# Patient Record
Sex: MALE | Race: BLACK OR AFRICAN AMERICAN | NOT HISPANIC OR LATINO | Employment: FULL TIME | URBAN - METROPOLITAN AREA
[De-identification: names, ages, dates, MRNs, and addresses within clinical notes are randomized per-mention and may not be internally consistent; named-entity substitution may affect disease eponyms.]

---

## 2017-02-07 ENCOUNTER — ALLSCRIPTS OFFICE VISIT (OUTPATIENT)
Dept: OTHER | Facility: OTHER | Age: 52
End: 2017-02-07

## 2017-02-07 DIAGNOSIS — N20.0 CALCULUS OF KIDNEY: ICD-10-CM

## 2017-02-07 DIAGNOSIS — R10.9 ABDOMINAL PAIN: ICD-10-CM

## 2017-02-17 ENCOUNTER — HOSPITAL ENCOUNTER (OUTPATIENT)
Dept: ULTRASOUND IMAGING | Facility: HOSPITAL | Age: 52
Discharge: HOME | End: 2017-02-17
Payer: COMMERCIAL

## 2017-02-17 ENCOUNTER — TRANSCRIBE ORDERS (OUTPATIENT)
Dept: ADMINISTRATIVE | Facility: HOSPITAL | Age: 52
End: 2017-02-17

## 2017-02-17 ENCOUNTER — APPOINTMENT (OUTPATIENT)
Dept: LAB | Facility: HOSPITAL | Age: 52
End: 2017-02-17
Payer: COMMERCIAL

## 2017-02-17 DIAGNOSIS — N20.0 URIC ACID KIDNEY STONE: ICD-10-CM

## 2017-02-17 DIAGNOSIS — N20.0 CALCULUS OF KIDNEY: ICD-10-CM

## 2017-02-17 DIAGNOSIS — R10.9 ABDOMINAL PAIN: ICD-10-CM

## 2017-02-17 DIAGNOSIS — R10.9 ABDOMINAL PAIN, UNSPECIFIED SITE: ICD-10-CM

## 2017-02-17 DIAGNOSIS — R10.9 ABDOMINAL PAIN, UNSPECIFIED SITE: Primary | ICD-10-CM

## 2017-02-17 LAB
BACTERIA UR QL AUTO: ABNORMAL /HPF
BASOPHILS # BLD AUTO: 0.05 THOUSANDS/ΜL (ref 0–0.1)
BASOPHILS NFR BLD AUTO: 1 % (ref 0–1)
BILIRUB UR QL STRIP: NEGATIVE
CLARITY UR: CLEAR
COLOR UR: YELLOW
EOSINOPHIL # BLD AUTO: 0.17 THOUSAND/ΜL (ref 0–0.61)
EOSINOPHIL NFR BLD AUTO: 3 % (ref 0–6)
ERYTHROCYTE [DISTWIDTH] IN BLOOD BY AUTOMATED COUNT: 11.3 % (ref 11.6–15.1)
GLUCOSE UR STRIP-MCNC: NEGATIVE MG/DL
HCT VFR BLD AUTO: 40.1 % (ref 36.5–49.3)
HGB BLD-MCNC: 13.7 G/DL (ref 12–17)
HGB UR QL STRIP.AUTO: ABNORMAL
KETONES UR STRIP-MCNC: NEGATIVE MG/DL
LEUKOCYTE ESTERASE UR QL STRIP: NEGATIVE
LYMPHOCYTES # BLD AUTO: 3.16 THOUSANDS/ΜL (ref 0.6–4.47)
LYMPHOCYTES NFR BLD AUTO: 61 % (ref 14–44)
MCH RBC QN AUTO: 35.1 PG (ref 26.8–34.3)
MCHC RBC AUTO-ENTMCNC: 34.2 G/DL (ref 31.4–37.4)
MCV RBC AUTO: 103 FL (ref 82–98)
MONOCYTES # BLD AUTO: 0.35 THOUSAND/ΜL (ref 0.17–1.22)
MONOCYTES NFR BLD AUTO: 7 % (ref 4–12)
NEUTROPHILS # BLD AUTO: 1.43 THOUSANDS/ΜL (ref 1.85–7.62)
NEUTS SEG NFR BLD AUTO: 28 % (ref 43–75)
NITRITE UR QL STRIP: NEGATIVE
NON-SQ EPI CELLS URNS QL MICRO: ABNORMAL /HPF
NRBC BLD AUTO-RTO: 0 /100 WBCS
PH UR STRIP.AUTO: 7 [PH] (ref 4.5–8)
PLATELET # BLD AUTO: 296 THOUSANDS/UL (ref 149–390)
PMV BLD AUTO: 9.9 FL (ref 8.9–12.7)
PROT UR STRIP-MCNC: NEGATIVE MG/DL
RBC # BLD AUTO: 3.9 MILLION/UL (ref 3.88–5.62)
RBC #/AREA URNS AUTO: ABNORMAL /HPF
SP GR UR STRIP.AUTO: 1.01 (ref 1–1.03)
UROBILINOGEN UR QL STRIP.AUTO: 0.2 E.U./DL
WBC # BLD AUTO: 5.16 THOUSAND/UL (ref 4.31–10.16)
WBC #/AREA URNS AUTO: ABNORMAL /HPF

## 2017-02-17 PROCEDURE — 76770 US EXAM ABDO BACK WALL COMP: CPT

## 2017-02-17 PROCEDURE — 81001 URINALYSIS AUTO W/SCOPE: CPT | Performed by: INTERNAL MEDICINE

## 2017-02-17 PROCEDURE — 85025 COMPLETE CBC W/AUTO DIFF WBC: CPT

## 2017-02-17 PROCEDURE — 36415 COLL VENOUS BLD VENIPUNCTURE: CPT

## 2017-03-08 ENCOUNTER — TRANSCRIBE ORDERS (OUTPATIENT)
Dept: ADMINISTRATIVE | Facility: HOSPITAL | Age: 52
End: 2017-03-08

## 2017-03-08 DIAGNOSIS — N20.0 CALCULUS OF KIDNEY: Primary | ICD-10-CM

## 2017-03-08 DIAGNOSIS — R10.9 ABDOMINAL PAIN, UNSPECIFIED SITE: ICD-10-CM

## 2017-03-14 DIAGNOSIS — N20.1 CALCULUS OF URETER: ICD-10-CM

## 2017-03-14 DIAGNOSIS — R10.9 ABDOMINAL PAIN: ICD-10-CM

## 2017-03-14 DIAGNOSIS — J20.9 ACUTE BRONCHITIS: ICD-10-CM

## 2017-03-20 ENCOUNTER — HOSPITAL ENCOUNTER (OUTPATIENT)
Dept: CT IMAGING | Facility: HOSPITAL | Age: 52
Discharge: HOME | End: 2017-03-20
Attending: UROLOGY
Payer: COMMERCIAL

## 2017-03-20 DIAGNOSIS — N20.1 CALCULUS OF URETER: ICD-10-CM

## 2017-03-20 DIAGNOSIS — R10.9 ABDOMINAL PAIN: ICD-10-CM

## 2017-03-20 PROCEDURE — 74176 CT ABD & PELVIS W/O CONTRAST: CPT

## 2017-04-03 ENCOUNTER — ALLSCRIPTS OFFICE VISIT (OUTPATIENT)
Dept: OTHER | Facility: OTHER | Age: 52
End: 2017-04-03

## 2017-04-10 ENCOUNTER — HOSPITAL ENCOUNTER (OUTPATIENT)
Dept: RADIOLOGY | Facility: CLINIC | Age: 52
Discharge: HOME | End: 2017-04-10
Payer: COMMERCIAL

## 2017-04-10 ENCOUNTER — ALLSCRIPTS OFFICE VISIT (OUTPATIENT)
Dept: OTHER | Facility: OTHER | Age: 52
End: 2017-04-10

## 2017-04-10 DIAGNOSIS — J20.9 ACUTE BRONCHITIS: ICD-10-CM

## 2017-04-10 PROCEDURE — 71020 HB CHEST X-RAY 2VW FRONTAL&LATL: CPT

## 2017-04-12 ENCOUNTER — GENERIC CONVERSION - ENCOUNTER (OUTPATIENT)
Dept: OTHER | Facility: OTHER | Age: 52
End: 2017-04-12

## 2017-05-17 ENCOUNTER — HOSPITAL ENCOUNTER (OUTPATIENT)
Dept: RADIOLOGY | Facility: HOSPITAL | Age: 52
Discharge: HOME | End: 2017-05-17
Attending: UROLOGY
Payer: COMMERCIAL

## 2017-05-17 ENCOUNTER — HOSPITAL ENCOUNTER (OUTPATIENT)
Dept: ULTRASOUND IMAGING | Facility: HOSPITAL | Age: 52
Discharge: HOME | End: 2017-05-17
Attending: UROLOGY
Payer: COMMERCIAL

## 2017-05-17 DIAGNOSIS — N20.0 CALCULUS OF KIDNEY: ICD-10-CM

## 2017-05-17 PROCEDURE — 74000 HB X-RAY EXAM OF ABDOMEN (SINGLE ANTEROPOSTERIOR VIEW): CPT

## 2017-05-17 PROCEDURE — 76770 US EXAM ABDO BACK WALL COMP: CPT

## 2017-05-26 ENCOUNTER — ALLSCRIPTS OFFICE VISIT (OUTPATIENT)
Dept: OTHER | Facility: OTHER | Age: 52
End: 2017-05-26

## 2017-05-26 ENCOUNTER — TRANSCRIBE ORDERS (OUTPATIENT)
Dept: ADMINISTRATIVE | Facility: HOSPITAL | Age: 52
End: 2017-05-26

## 2017-05-26 DIAGNOSIS — N20.0 KIDNEY STONE: Primary | ICD-10-CM

## 2017-05-27 DIAGNOSIS — N20.0 CALCULUS OF KIDNEY: ICD-10-CM

## 2017-06-02 ENCOUNTER — ALLSCRIPTS OFFICE VISIT (OUTPATIENT)
Dept: OTHER | Facility: OTHER | Age: 52
End: 2017-06-02

## 2017-07-07 ENCOUNTER — ALLSCRIPTS OFFICE VISIT (OUTPATIENT)
Dept: OTHER | Facility: OTHER | Age: 52
End: 2017-07-07

## 2017-08-30 ENCOUNTER — ALLSCRIPTS OFFICE VISIT (OUTPATIENT)
Dept: OTHER | Facility: OTHER | Age: 52
End: 2017-08-30

## 2017-11-02 ENCOUNTER — GENERIC CONVERSION - ENCOUNTER (OUTPATIENT)
Dept: OTHER | Facility: OTHER | Age: 52
End: 2017-11-02

## 2018-01-10 NOTE — RESULT NOTES
Verified Results  * XR CHEST PA & LATERAL [id number] 03:37PM Althea Leroy Order Number: [id number]     Test Name Result Flag Reference   XR CHEST PA & LATERAL (Report)     CHEST      INDICATION: Acute bronchitis  Cough, wheezing, chest tightness  COMPARISON: None     VIEWS: Frontal and lateral projections     IMAGES: 2     FINDINGS:        Cardiomediastinal silhouette appears unremarkable  The lungs are clear  No pneumothorax or pleural effusion  Visualized osseous structures appear within normal limits for the patient's age  IMPRESSION:     No active pulmonary disease         Workstation performed: [id number]     Signed by:   Alexandre Yip MD   4/12/17

## 2018-01-12 VITALS
DIASTOLIC BLOOD PRESSURE: 58 MMHG | HEART RATE: 98 BPM | HEIGHT: 73 IN | SYSTOLIC BLOOD PRESSURE: 120 MMHG | TEMPERATURE: 98.5 F | WEIGHT: 192.5 LBS | OXYGEN SATURATION: 96 % | BODY MASS INDEX: 25.51 KG/M2

## 2018-01-12 VITALS
WEIGHT: 206 LBS | HEART RATE: 75 BPM | RESPIRATION RATE: 18 BRPM | DIASTOLIC BLOOD PRESSURE: 92 MMHG | BODY MASS INDEX: 26.45 KG/M2 | SYSTOLIC BLOOD PRESSURE: 134 MMHG

## 2018-01-13 VITALS
WEIGHT: 200 LBS | DIASTOLIC BLOOD PRESSURE: 90 MMHG | BODY MASS INDEX: 25.67 KG/M2 | SYSTOLIC BLOOD PRESSURE: 136 MMHG | HEART RATE: 95 BPM | HEIGHT: 74 IN | TEMPERATURE: 98.3 F

## 2018-01-14 VITALS
HEIGHT: 73 IN | BODY MASS INDEX: 26.27 KG/M2 | HEART RATE: 82 BPM | WEIGHT: 198.25 LBS | DIASTOLIC BLOOD PRESSURE: 98 MMHG | SYSTOLIC BLOOD PRESSURE: 142 MMHG | TEMPERATURE: 98.5 F | OXYGEN SATURATION: 98 %

## 2018-01-14 VITALS
HEIGHT: 74 IN | BODY MASS INDEX: 25.54 KG/M2 | DIASTOLIC BLOOD PRESSURE: 60 MMHG | HEART RATE: 68 BPM | WEIGHT: 199 LBS | SYSTOLIC BLOOD PRESSURE: 116 MMHG

## 2018-01-14 VITALS
DIASTOLIC BLOOD PRESSURE: 98 MMHG | WEIGHT: 194.25 LBS | OXYGEN SATURATION: 96 % | BODY MASS INDEX: 24.93 KG/M2 | SYSTOLIC BLOOD PRESSURE: 138 MMHG | TEMPERATURE: 98.1 F | HEIGHT: 74 IN | HEART RATE: 84 BPM

## 2018-01-15 VITALS
BODY MASS INDEX: 25.44 KG/M2 | TEMPERATURE: 98.8 F | SYSTOLIC BLOOD PRESSURE: 134 MMHG | WEIGHT: 198.25 LBS | OXYGEN SATURATION: 98 % | HEART RATE: 92 BPM | HEIGHT: 74 IN | DIASTOLIC BLOOD PRESSURE: 82 MMHG

## 2018-01-16 NOTE — PROGRESS NOTES
Chief Complaint  Patient presents for stent with string removal s/p URS 6/21/16  Active Problems    1  Abnormal blood chemistry (790 6) (R79 9)   2  Abnormal loss of weight (783 21) (R63 4)   3  Activity involving cardiorespiratory exercise (E009 9) (Y93 A9)   4  Acute pharyngitis (462) (J02 9)   5  Allergic rhinitis (477 9) (J30 9)   6  Benign essential hypertension (401 1) (I10)   7  Cellulitis, face (682 0) (L03 211)   8  CKD stage G2/A2, GFR 60 - 89 and albumin creatinine ratio 30 - 299 mg/g (585 2)   (N18 2)   9  Encounter for screening colonoscopy (V76 51) (Z12 11)   10  Fatigue (780 79) (R53 83)   11  Fatigue (780 79) (R53 83)   12  Flank pain (789 09) (R10 9)   13  High risk sexual behavior (V69 2) (Z72 51)   14  Hydronephrosis, right (591) (N13 30)   15  Kidney stones (592 0) (N20 0)   16  Macrocytosis (289 89) (D75 89)   17  Mitral valve disorder (424 0) (I05 9)   18  Neutropenia (288 00) (D70 9)   19  Preoperative examination (V72 84) (Z01 818)   20  Renal calculus, right (592 0) (N20 0)   21  Right ureteral calculus (592 1) (N20 1)   22  Special screening examination for neoplasm of prostate (V76 44) (Z12 5)   23  Strep throat (034 0) (J02 0)   24  Thrombocytosis (238 71) (D47 3)   25  Urinary frequency (788 41) (R35 0)    Current Meds   1  Aleve 220 MG Oral Tablet; Therapy: (Recorded:24Rpm2606) to Recorded   2  Multi-Vitamins Oral Tablet; TAKE 1 TABLET DAILY; Therapy: 70KCE7131 to (Evaluate:09Dec2015); Last Rx:09Nov2015 Ordered   3  Oxycodone-Acetaminophen 5-325 MG Oral Tablet (Percocet); TAKE 1 TABLET EVERY 4   TO 6 HOURS AS NEEDED FOR PAIN;   Therapy: 35ODE1618 to (Evaluate:18Jun2016); Last Rx:19May2016 Ordered   4  Tamsulosin HCl - 0 4 MG Oral Capsule; TAKE 1 CAPSULE Bedtime; Therapy: 54HIM5636 to (Evaluate:35Ogv6269)  Requested for: 72IKF5955; Last   Rx:13Ywu1022 Ordered    Allergies    1   No Known Drug Allergies    Vitals  Signs [Data Includes: Current Encounter]    Heart Rate: 82  Systolic: 578  Diastolic: 80  Height: 6 ft 2 in  Weight: 199 lb   BMI Calculated: 25 55  BSA Calculated: 2 17    Procedure    Procedure: Stent Removal   Stent with string removed intact      Assessment    1  Hydronephrosis, right (591) (N13 30)   2  Kidney stones (592 0) (N20 0)    Discussion/Summary    Reviewed pain management, adequate hydration, and to maintain regular bowel movements  Recommended Colace BID and Miralax 1 capful BID  Call office with fever, nausea, vomiting, burning or increased urinary frequency        Future Appointments    Date/Time Provider Specialty Site   08/08/2016 10:15 AM Treva Schlatter, MD Urology [address]     Signatures   Electronically signed by : Lanice Angelucci, ; Jun 24 2016 11:26AM EST                       (Author)    Electronically signed by : Christian Leung MD; Jun 28 2016  4:11PM EST

## 2018-01-22 VITALS
WEIGHT: 205.13 LBS | HEART RATE: 71 BPM | HEIGHT: 74 IN | OXYGEN SATURATION: 96 % | TEMPERATURE: 98.2 F | SYSTOLIC BLOOD PRESSURE: 122 MMHG | BODY MASS INDEX: 26.33 KG/M2 | DIASTOLIC BLOOD PRESSURE: 82 MMHG

## 2018-05-25 ENCOUNTER — HOSPITAL ENCOUNTER (OUTPATIENT)
Dept: ULTRASOUND IMAGING | Facility: HOSPITAL | Age: 53
Discharge: HOME | End: 2018-05-25
Payer: COMMERCIAL

## 2018-05-25 DIAGNOSIS — N20.0 KIDNEY STONE: ICD-10-CM

## 2018-05-25 DIAGNOSIS — N20.0 CALCULUS OF KIDNEY: ICD-10-CM

## 2018-05-25 PROCEDURE — 76770 US EXAM ABDO BACK WALL COMP: CPT

## 2018-07-05 DIAGNOSIS — N20.0 KIDNEY STONES: Primary | ICD-10-CM

## 2018-07-09 ENCOUNTER — HOSPITAL ENCOUNTER (OUTPATIENT)
Dept: RADIOLOGY | Facility: HOSPITAL | Age: 53
Discharge: HOME | End: 2018-07-09
Attending: UROLOGY
Payer: COMMERCIAL

## 2018-07-09 DIAGNOSIS — N20.0 KIDNEY STONES: ICD-10-CM

## 2018-07-09 PROCEDURE — 74018 RADEX ABDOMEN 1 VIEW: CPT

## 2018-07-11 ENCOUNTER — OFFICE VISIT (OUTPATIENT)
Dept: UROLOGY | Facility: CLINIC | Age: 53
End: 2018-07-11
Payer: COMMERCIAL

## 2018-07-11 VITALS
WEIGHT: 198 LBS | BODY MASS INDEX: 25.41 KG/M2 | HEART RATE: 88 BPM | SYSTOLIC BLOOD PRESSURE: 142 MMHG | DIASTOLIC BLOOD PRESSURE: 82 MMHG | HEIGHT: 74 IN

## 2018-07-11 DIAGNOSIS — N20.0 KIDNEY CALCULI: Primary | ICD-10-CM

## 2018-07-11 PROCEDURE — 99213 OFFICE O/P EST LOW 20 MIN: CPT | Performed by: UROLOGY

## 2018-07-11 RX ORDER — MULTIVITAMIN
1 CAPSULE ORAL DAILY
COMMUNITY
End: 2021-07-27

## 2018-07-11 RX ORDER — NAPROXEN SODIUM 220 MG
220 TABLET ORAL AS NEEDED
COMMUNITY
End: 2021-04-20

## 2018-07-11 NOTE — PROGRESS NOTES
Referring Physician: Maite Burris MD  A copy of this note was sent to the referring physician  Diagnoses and all orders for this visit:    Kidney calculi  -     Ambulatory referral to Nephrology; Future  -     CT renal stone study abdomen pelvis wo contrast; Future    Other orders  -     naproxen sodium (ALEVE) 220 MG tablet; Take by mouth  -     Multiple Vitamin (MULTIVITAMIN) capsule; Take 1 capsule by mouth daily            Assessment and plan: Daniela Farris is a 49-year-old male with recurrent nephrolithiasis    Since he continues to pass stones and appears to have a right lower pole intrarenal calculus present on ultrasound (though non radiopaque on plain x-ray) I have recommended evaluation by a nephrologist for metabolic evaluation  Swapna Leslie is on board with this  Since his stones are difficult to visualize on KUB we will obtain a stone protocol CT prior to his next visit in 6 months  Pending the results we can discuss preemptive management of his intrarenal calculus versus continued observation      Andrew Barone MD      Chief Complaint     Kidney stone follow-up      History of Present Illness     Joan Engle is a 45 y o  male returns in follow-up for recurrent nephrolithiasis  He passed a few small kidney stones in February  He had mild discomfort 48-72 hours associated with this  He is currently asymptomatic  He reports no flank pain dysuria, urinary frequency urgency or back pain  He presents with updated KUB and ultrasound  Detailed Urologic History     - please refer to HPI    Review of Systems     Review of Systems   Constitutional: Negative for activity change and fatigue  HENT: Negative for congestion  Eyes: Negative for visual disturbance  Respiratory: Negative for shortness of breath and wheezing  Cardiovascular: Negative for chest pain and leg swelling  Gastrointestinal: Negative for abdominal pain  Endocrine: Positive for polyuria     Genitourinary: Positive for dysuria  Negative for flank pain, hematuria and urgency  Musculoskeletal: Negative for back pain  Allergic/Immunologic: Negative for immunocompromised state  Neurological: Negative for dizziness and numbness  Psychiatric/Behavioral: Negative for dysphoric mood  All other systems reviewed and are negative  AUA SYMPTOM SCORE      Most Recent Value   AUA SYMPTOM SCORE   How often have you had a sensation of not emptying your bladder completely after you finished urinating? 1   How often have you had to urinate again less than two hours after you finished urinating? 0   How often have you found you stopped and started again several times when you urinate? 1   How often have you found it difficult to postpone urination? 2   How often have you had a weak urinary stream?  0   How often have you had to push or strain to begin urination? 2   How many times did you most typically get up to urinate from the time you went to bed at night until the time you got up in the morning? 1   Quality of Life: If you were to spend the rest of your life with your urinary condition just the way it is now, how would you feel about that?  3   AUA SYMPTOM SCORE  7            Allergies     No Known Allergies    Physical Exam     Physical Exam   Constitutional: He is oriented to person, place, and time  He appears well-developed and well-nourished  No distress  HENT:   Head: Normocephalic and atraumatic  Eyes: EOM are normal    Neck: Normal range of motion  Cardiovascular:   Negative lower extremity edema   Pulmonary/Chest: Effort normal and breath sounds normal    Abdominal: Soft  Genitourinary:   Genitourinary Comments: Negative CVA tenderness   Musculoskeletal: Normal range of motion  Neurological: He is alert and oriented to person, place, and time  Skin: Skin is warm  Psychiatric: He has a normal mood and affect   His behavior is normal            Vital Signs  Vitals:    07/11/18 1409   BP: 142/82   BP Location: Left arm   Patient Position: Sitting   Cuff Size: Adult   Pulse: 88   Weight: 89 8 kg (198 lb)   Height: 6' 2" (1 88 m)         Current Medications       Current Outpatient Prescriptions:     Multiple Vitamin (MULTIVITAMIN) capsule, Take 1 capsule by mouth daily, Disp: , Rfl:     naproxen sodium (ALEVE) 220 MG tablet, Take by mouth, Disp: , Rfl:       Active Problems     Patient Active Problem List   Diagnosis    Kidney calculi         Past Medical History     Past Medical History:   Diagnosis Date    Hypertension     resolved    Kidney calculi     Mitral valve prolapse          Surgical History     Past Surgical History:   Procedure Laterality Date    ORTHOPEDIC SURGERY Left     hand fracture repaired    OK CYSTO/URETERO W/LITHOTRIPSY &INDWELL STENT INSRT Right 6/21/2016    Procedure: CYSTOSCOPY; URETEROSCOPY;  RETROGRADE PYELOGRAM; STENT ;  Surgeon: Tommie Carcamo MD;  [facility];  Service: Urology    SHOULDER ARTHROSCOPY W/ LABRAL REPAIR           Family History     Family History   Problem Relation Age of Onset    Stomach cancer Father     Breast cancer Sister          Social History     Social History     History   Smoking Status    Former Smoker   Smokeless Tobacco    Never Used         Pertinent Lab Values     Lab Results   Component Value Date    CREATININE 1 70 (H) 05/11/2016       No results found for: PSA    @RESULTRCNT(1H])@      Pertinent Imaging        FINDINGS:     No radiopaque renal calculi seen      No radiopaque ureteral calculi identified      Normal bowel gas pattern      Mild chronic elevation right hemidiaphragm      No acute osseous abnormality is seen      IMPRESSION:     No radiopaque urinary tract calculi  KIDNEYS:  Symmetric and normal size  Right kidney:  11 2 x 4 7 cm  Left kidney:  10 5 x 6 5 cm      Right kidney  Normal echogenicity and contour  No suspicious masses detected  No hydronephrosis     2-3 Echogenic foci with some distal shadowing are seen in the lower pole of the right kidney compatible with nonobstructing calculi  The largest measures about 0 5 cm  No perinephric fluid collections      Left kidney  Normal echogenicity and contour  No suspicious masses detected  No hydronephrosis  No shadowing calculi  No perinephric fluid collections      URETERS:  Nonvisualized      BLADDER:   Partially distended  No focal thickening or mass lesions  Bilateral ureteral jets detected         IMPRESSION:     Nonobstructing the calculi in the lower pole of the right kidney is also seen on the previous CT of March 20, 2017  Portions of the record may have been created with voice recognition software   Occasional wrong word or "sound a like" substitutions may have occurred due to the inherent limitations of voice recognition software   Read the chart carefully and recognize, using context, where substitutions have occurred

## 2018-07-17 ENCOUNTER — OFFICE VISIT (OUTPATIENT)
Dept: INTERNAL MEDICINE CLINIC | Facility: CLINIC | Age: 53
End: 2018-07-17
Payer: COMMERCIAL

## 2018-07-17 ENCOUNTER — TELEPHONE (OUTPATIENT)
Dept: INTERNAL MEDICINE CLINIC | Facility: CLINIC | Age: 53
End: 2018-07-17

## 2018-07-17 VITALS
WEIGHT: 198 LBS | DIASTOLIC BLOOD PRESSURE: 92 MMHG | HEART RATE: 84 BPM | HEIGHT: 74 IN | BODY MASS INDEX: 25.41 KG/M2 | OXYGEN SATURATION: 96 % | SYSTOLIC BLOOD PRESSURE: 140 MMHG

## 2018-07-17 DIAGNOSIS — M79.674 TOE PAIN, RIGHT: Primary | ICD-10-CM

## 2018-07-17 PROCEDURE — 99214 OFFICE O/P EST MOD 30 MIN: CPT | Performed by: PHYSICIAN ASSISTANT

## 2018-07-17 RX ORDER — PREDNISONE 10 MG/1
10 TABLET ORAL DAILY
Qty: 20 TABLET | Refills: 0 | Status: SHIPPED | OUTPATIENT
Start: 2018-07-17 | End: 2018-09-18 | Stop reason: ALTCHOICE

## 2018-07-17 NOTE — PROGRESS NOTES
Assessment/Plan:     pain of the right great toe status post trauma - x-ray of the right foot with attention to the right great toe  Prednisone taper  The patient may need to see an orthopedist   In the meantime he can gayle tape his 1st and 2nd toe together  No problem-specific Assessment & Plan notes found for this encounter  Diagnoses and all orders for this visit:    Toe pain, right  -     XR foot 3+ vw right; Future  -     predniSONE 10 mg tablet; Take 1 tablet (10 mg total) by mouth daily 40mg daily for 2 days, 30mg daily for 2 days, 20mg daily for 2 days, 10mg daily for 2 days          Subjective:      Patient ID: Matthew Miranda is a 45 y o  male  Patient comes in complaining of pain and swelling of his right great toe  He was working out at [employer] on Sunday, 2 days ago, and he says he jammed his toe  He said it happened so fast she does not really know exactly how he did it  The patient does have a bunion which is chronic but he has pain along the length of his right great toe with tenderness and some swelling  Decreased range of motion of his toes as a result of pain  No numbness or tingling  He is wearing a slipper in the office  The following portions of the patient's history were reviewed and updated as appropriate: allergies, current medications, past family history, past medical history, past social history, past surgical history and problem list     Review of Systems   Musculoskeletal: Positive for arthralgias and joint swelling  Skin: Negative for rash  Neurological: Negative for numbness  Objective:      /92   Pulse 84   Ht 6' 2" (1 88 m)   Wt 89 8 kg (198 lb)   SpO2 96%   BMI 25 42 kg/m²          Physical Exam   Cardiovascular: Normal rate and regular rhythm  Pulmonary/Chest: Effort normal and breath sounds normal    Musculoskeletal:        Feet:      Slight swelling, tenderness and pain in the area marked red    Bunion in the area marked blue

## 2018-07-17 NOTE — TELEPHONE ENCOUNTER
David would like dr solano to give him an order to get an xray of his right big toe  Thinks he broke it Sunday  Please call him when done   Thanks [phone]

## 2018-07-18 ENCOUNTER — HOSPITAL ENCOUNTER (OUTPATIENT)
Dept: RADIOLOGY | Facility: HOSPITAL | Age: 53
Discharge: HOME | End: 2018-07-18
Payer: COMMERCIAL

## 2018-07-18 ENCOUNTER — TELEPHONE (OUTPATIENT)
Dept: INTERNAL MEDICINE CLINIC | Facility: CLINIC | Age: 53
End: 2018-07-18

## 2018-07-18 DIAGNOSIS — M79.674 TOE PAIN, RIGHT: ICD-10-CM

## 2018-07-18 PROCEDURE — 73630 X-RAY EXAM OF FOOT: CPT

## 2018-07-18 NOTE — TELEPHONE ENCOUNTER
Scott Hunter from [facility] radiology called to let Elisa Wen know results are in for pt's toe xray   Please advise

## 2018-09-18 ENCOUNTER — OFFICE VISIT (OUTPATIENT)
Dept: INTERNAL MEDICINE CLINIC | Facility: CLINIC | Age: 53
End: 2018-09-18
Payer: COMMERCIAL

## 2018-09-18 VITALS
RESPIRATION RATE: 14 BRPM | HEIGHT: 74 IN | WEIGHT: 201 LBS | BODY MASS INDEX: 25.8 KG/M2 | HEART RATE: 80 BPM | DIASTOLIC BLOOD PRESSURE: 74 MMHG | OXYGEN SATURATION: 98 % | SYSTOLIC BLOOD PRESSURE: 132 MMHG

## 2018-09-18 DIAGNOSIS — Z12.11 SCREENING FOR COLON CANCER: Primary | ICD-10-CM

## 2018-09-18 DIAGNOSIS — J30.1 SEASONAL ALLERGIC RHINITIS DUE TO POLLEN: ICD-10-CM

## 2018-09-18 DIAGNOSIS — Z12.5 SCREENING FOR PROSTATE CANCER: ICD-10-CM

## 2018-09-18 DIAGNOSIS — I10 BENIGN ESSENTIAL HYPERTENSION: ICD-10-CM

## 2018-09-18 DIAGNOSIS — Z12.11 SCREEN FOR COLON CANCER: ICD-10-CM

## 2018-09-18 PROBLEM — N20.1 LEFT URETERAL CALCULUS: Status: ACTIVE | Noted: 2017-03-07

## 2018-09-18 PROBLEM — H61.22 IMPACTED CERUMEN OF LEFT EAR: Status: ACTIVE | Noted: 2018-09-18

## 2018-09-18 PROBLEM — M21.611 BUNION, RIGHT FOOT: Status: ACTIVE | Noted: 2017-11-02

## 2018-09-18 PROBLEM — N18.30 CHRONIC KIDNEY DISEASE (CKD), STAGE III (MODERATE) (HCC): Status: ACTIVE | Noted: 2017-11-02

## 2018-09-18 PROCEDURE — 3008F BODY MASS INDEX DOCD: CPT | Performed by: INTERNAL MEDICINE

## 2018-09-18 PROCEDURE — 99214 OFFICE O/P EST MOD 30 MIN: CPT | Performed by: INTERNAL MEDICINE

## 2018-09-18 RX ORDER — FLUTICASONE PROPIONATE 50 MCG
2 SPRAY, SUSPENSION (ML) NASAL DAILY
Qty: 16 G | Refills: 1 | Status: SHIPPED | OUTPATIENT
Start: 2018-09-18 | End: 2021-03-23

## 2018-09-18 NOTE — PROGRESS NOTES
Assessment/Plan:    Impacted cerumen of left ear  Start him on debrox for one week then arrange for ear lavage with Christa Real     Seasonal allergic rhinitis due to pollen  No evidence of acute infection  Will start nasocort and if that fails add in zyrtec    Screen for colon cancer  Fit ordered    Screening for prostate cancer  psa ordered       Diagnoses and all orders for this visit:    Screening for colon cancer  -     Ambulatory referral to Gastroenterology; Future    Seasonal allergic rhinitis due to pollen  -     fluticasone (FLONASE) 50 mcg/act nasal spray; 2 sprays into each nostril daily    Benign essential hypertension    Screen for colon cancer    Screening for prostate cancer          Subjective:      Patient ID: Екатерина Staton is a 52 y o  male  Patient presents with a one week hx of left nasal congestion and left ear popping and with excessive cerumen  He has left nasal clear rhinorrhea  No fevers, chills, sweats  He has and occasional cough  No sob  He took motrin, advil allergy and cold  It didn't make a difference  No contacts with uri sx  The following portions of the patient's history were reviewed and updated as appropriate: allergies, current medications, past family history, past medical history, past social history, past surgical history and problem list     Review of Systems   Constitutional: Negative for activity change, appetite change, chills, diaphoresis, fatigue, fever and unexpected weight change  HENT: Positive for congestion and rhinorrhea  Negative for dental problem, drooling, ear discharge, ear pain, facial swelling, hearing loss, mouth sores, nosebleeds, postnasal drip, sinus pain, sinus pressure, sneezing, sore throat, tinnitus, trouble swallowing and voice change  Eyes: Negative for photophobia, pain, discharge, redness, itching and visual disturbance  Respiratory: Negative for apnea, cough, choking, chest tightness, shortness of breath, wheezing and stridor  Cardiovascular: Negative for chest pain, palpitations and leg swelling  Gastrointestinal: Negative for abdominal distention, abdominal pain, anal bleeding, blood in stool, constipation, diarrhea, nausea, rectal pain and vomiting  Endocrine: Negative for cold intolerance, heat intolerance, polydipsia, polyphagia and polyuria  Genitourinary: Negative for decreased urine volume, difficulty urinating, dysuria, enuresis, flank pain, frequency, genital sores, hematuria and urgency  Musculoskeletal: Negative for arthralgias, back pain, gait problem, joint swelling, myalgias, neck pain and neck stiffness  Skin: Negative for color change, pallor, rash and wound  Allergic/Immunologic: Negative for environmental allergies, food allergies and immunocompromised state  Neurological: Negative for dizziness, tremors, seizures, syncope, facial asymmetry, speech difficulty, weakness, light-headedness, numbness and headaches  Hematological: Negative for adenopathy  Does not bruise/bleed easily  Psychiatric/Behavioral: Negative for agitation, self-injury, sleep disturbance and suicidal ideas  The patient is not nervous/anxious  Objective:      /74   Pulse 80   Resp 14   Ht 6' 2" (1 88 m)   Wt 91 2 kg (201 lb)   SpO2 98%   BMI 25 81 kg/m²          Physical Exam   Constitutional: He is oriented to person, place, and time  He appears well-developed and well-nourished  No distress  HENT:   Head: Normocephalic and atraumatic  Right Ear: External ear normal    Mouth/Throat: Oropharynx is clear and moist    Left cerumen impaction   Eyes: Conjunctivae and EOM are normal  Pupils are equal, round, and reactive to light  No scleral icterus  Neck: Normal range of motion  Neck supple  No JVD present  No tracheal deviation present  No thyromegaly present  Cardiovascular: Normal rate, regular rhythm and intact distal pulses  Exam reveals no gallop and no friction rub      No murmur heard   Pulmonary/Chest: Effort normal and breath sounds normal  No respiratory distress  He has no wheezes  He has no rales  He exhibits no tenderness  Abdominal: Soft  Bowel sounds are normal  He exhibits no distension and no mass  There is no tenderness  There is no rebound and no guarding  Musculoskeletal: Normal range of motion  He exhibits no edema, tenderness or deformity  Lymphadenopathy:     He has no cervical adenopathy  Neurological: He is alert and oriented to person, place, and time  He has normal reflexes  No cranial nerve deficit  Coordination normal    Skin: Skin is warm and dry  No rash noted  He is not diaphoretic  No erythema  No pallor  Psychiatric: He has a normal mood and affect   His behavior is normal  Judgment and thought content normal

## 2018-09-24 ENCOUNTER — OFFICE VISIT (OUTPATIENT)
Dept: INTERNAL MEDICINE CLINIC | Facility: CLINIC | Age: 53
End: 2018-09-24
Payer: COMMERCIAL

## 2018-09-24 VITALS
OXYGEN SATURATION: 98 % | BODY MASS INDEX: 26.06 KG/M2 | WEIGHT: 203 LBS | SYSTOLIC BLOOD PRESSURE: 122 MMHG | DIASTOLIC BLOOD PRESSURE: 82 MMHG | HEART RATE: 69 BPM | TEMPERATURE: 98.2 F

## 2018-09-24 DIAGNOSIS — H61.22 IMPACTED CERUMEN OF LEFT EAR: Primary | ICD-10-CM

## 2018-09-24 LAB
ALBUMIN SERPL-MCNC: 4.3 G/DL (ref 3.5–5.5)
ALBUMIN/GLOB SERPL: 1.9 {RATIO} (ref 1.2–2.2)
ALP SERPL-CCNC: 45 IU/L (ref 39–117)
ALT SERPL-CCNC: 21 IU/L (ref 0–44)
AMBIG ABBREV DEFAULT: NORMAL
AMBIG ABBREV DEFAULT: NORMAL
AST SERPL-CCNC: 25 IU/L (ref 0–40)
BASOPHILS # BLD AUTO: 0.1 X10E3/UL (ref 0–0.2)
BASOPHILS NFR BLD AUTO: 1 %
BILIRUB SERPL-MCNC: 0.9 MG/DL (ref 0–1.2)
BUN SERPL-MCNC: 11 MG/DL (ref 6–24)
BUN/CREAT SERPL: 10 (ref 9–20)
CALCIUM SERPL-MCNC: 9.5 MG/DL (ref 8.7–10.2)
CHLORIDE SERPL-SCNC: 102 MMOL/L (ref 96–106)
CHOLEST SERPL-MCNC: 142 MG/DL (ref 100–199)
CO2 SERPL-SCNC: 23 MMOL/L (ref 20–29)
CREAT SERPL-MCNC: 1.07 MG/DL (ref 0.76–1.27)
EOSINOPHIL # BLD AUTO: 1 X10E3/UL (ref 0–0.4)
EOSINOPHIL NFR BLD AUTO: 17 %
ERYTHROCYTE [DISTWIDTH] IN BLOOD BY AUTOMATED COUNT: 11.5 % (ref 12.3–15.4)
GLOBULIN SER-MCNC: 2.3 G/DL (ref 1.5–4.5)
GLUCOSE SERPL-MCNC: 102 MG/DL (ref 65–99)
HCT VFR BLD AUTO: 43 % (ref 37.5–51)
HDLC SERPL-MCNC: 58 MG/DL
HGB BLD-MCNC: 14.3 G/DL (ref 13–17.7)
IMM GRANULOCYTES # BLD: 0 X10E3/UL (ref 0–0.1)
IMM GRANULOCYTES NFR BLD: 0 %
LDLC SERPL CALC-MCNC: 54 MG/DL (ref 0–99)
LYMPHOCYTES # BLD AUTO: 2.7 X10E3/UL (ref 0.7–3.1)
LYMPHOCYTES NFR BLD AUTO: 44 %
MCH RBC QN AUTO: 35.6 PG (ref 26.6–33)
MCHC RBC AUTO-ENTMCNC: 33.3 G/DL (ref 31.5–35.7)
MCV RBC AUTO: 107 FL (ref 79–97)
MONOCYTES # BLD AUTO: 0.4 X10E3/UL (ref 0.1–0.9)
MONOCYTES NFR BLD AUTO: 7 %
NEUTROPHILS # BLD AUTO: 1.9 X10E3/UL (ref 1.4–7)
NEUTROPHILS NFR BLD AUTO: 31 %
PLATELET # BLD AUTO: 312 X10E3/UL (ref 150–379)
POTASSIUM SERPL-SCNC: 4.6 MMOL/L (ref 3.5–5.2)
PROT SERPL-MCNC: 6.6 G/DL (ref 6–8.5)
PSA SERPL-MCNC: 0.4 NG/ML (ref 0–4)
RBC # BLD AUTO: 4.02 X10E6/UL (ref 4.14–5.8)
SL AMB EGFR AFRICAN AMERICAN: 90 ML/MIN/1.73
SL AMB EGFR NON AFRICAN AMERICAN: 78 ML/MIN/1.73
SODIUM SERPL-SCNC: 139 MMOL/L (ref 134–144)
TRIGL SERPL-MCNC: 150 MG/DL (ref 0–149)
TSH SERPL DL<=0.005 MIU/L-ACNC: 1.5 UIU/ML (ref 0.45–4.5)
WBC # BLD AUTO: 6.1 X10E3/UL (ref 3.4–10.8)

## 2018-09-24 PROCEDURE — 99213 OFFICE O/P EST LOW 20 MIN: CPT | Performed by: PHYSICIAN ASSISTANT

## 2018-09-24 NOTE — PROGRESS NOTES
Assessment/Plan:    Left cerumen impaction-  Left ear irrigated and successful  Pt tolerated the procedure well  No complaints  No problem-specific Assessment & Plan notes found for this encounter  There are no diagnoses linked to this encounter  Subjective:      Patient ID: Irwin Islas is a 52 y o  male  Cerumen impaction, left ear  He was here about a week or so ago and Dr Kiel Bal noticed his left ear was impacted with wax  He was told to get Debrox and use them for 5 days before coming back in for irrigation  No ear pain  Slight muffled sensation        The following portions of the patient's history were reviewed and updated as appropriate: allergies, current medications, past family history, past medical history, past social history, past surgical history and problem list     Review of Systems   HENT: Positive for hearing loss  Negative for ear pain, sinus pressure, sneezing and sore throat  Respiratory: Negative for cough  Cardiovascular: Negative for chest pain  Objective:      /82 (BP Location: Left arm, Patient Position: Sitting, Cuff Size: Standard)   Pulse 69   Temp 98 2 °F (36 8 °C)   Wt 92 1 kg (203 lb)   SpO2 98%   BMI 26 06 kg/m²          Physical Exam   Constitutional: He appears well-developed and well-nourished  HENT:   Head: Normocephalic and atraumatic  Right Ear: External ear normal    Left Ear: External ear normal    Mouth/Throat: Oropharynx is clear and moist  No oropharyngeal exudate  Cerumen impacted left ear  Right ear is normal   Cardiovascular: Normal rate and regular rhythm  Pulmonary/Chest: Effort normal and breath sounds normal    Abdominal: Soft

## 2018-09-27 ENCOUNTER — TELEPHONE (OUTPATIENT)
Dept: INTERNAL MEDICINE CLINIC | Facility: CLINIC | Age: 53
End: 2018-09-27

## 2018-10-02 ENCOUNTER — OFFICE VISIT (OUTPATIENT)
Dept: INTERNAL MEDICINE CLINIC | Facility: CLINIC | Age: 53
End: 2018-10-02
Payer: COMMERCIAL

## 2018-10-02 VITALS
OXYGEN SATURATION: 83 % | HEIGHT: 74 IN | DIASTOLIC BLOOD PRESSURE: 84 MMHG | WEIGHT: 200 LBS | HEART RATE: 99 BPM | BODY MASS INDEX: 25.67 KG/M2 | RESPIRATION RATE: 16 BRPM | SYSTOLIC BLOOD PRESSURE: 128 MMHG

## 2018-10-02 DIAGNOSIS — H61.22 IMPACTED CERUMEN OF LEFT EAR: Primary | ICD-10-CM

## 2018-10-02 DIAGNOSIS — J30.1 SEASONAL ALLERGIC RHINITIS DUE TO POLLEN: ICD-10-CM

## 2018-10-02 DIAGNOSIS — Z12.5 SCREENING FOR PROSTATE CANCER: ICD-10-CM

## 2018-10-02 DIAGNOSIS — R73.01 IMPAIRED FASTING GLUCOSE: ICD-10-CM

## 2018-10-02 DIAGNOSIS — I10 BENIGN ESSENTIAL HYPERTENSION: ICD-10-CM

## 2018-10-02 PROCEDURE — 99214 OFFICE O/P EST MOD 30 MIN: CPT | Performed by: INTERNAL MEDICINE

## 2018-10-02 NOTE — ASSESSMENT & PLAN NOTE
He is taking flonase zyrtec and pseuodphed  I think he may have so much dryness from the regimen that it is clogging his left nostril  Will dc all and try saline spray 2-3x daily and he will let me know how he is doing  If he fails to improve consider a course of ceftin

## 2018-10-02 NOTE — PROGRESS NOTES
Assessment/Plan:    Seasonal allergic rhinitis due to pollen  He is taking flonase zyrtec and pseuodphed  I think he may have so much dryness from the regimen that it is clogging his left nostril  Will dc all and try saline spray 2-3x daily and he will let me know how he is doing  If he fails to improve consider a course of ceftin  Screening for prostate cancer  psa and sylvester benign  Impaired fasting glucose  He has never had a fbs over 100  Will check fbs and a1c with next labs  Diagnoses and all orders for this visit:    Impacted cerumen of left ear    Seasonal allergic rhinitis due to pollen    Benign essential hypertension    Screening for prostate cancer    Impaired fasting glucose          Subjective:      Patient ID: Jose Alejandro Tapia is a 52 y o  male  Patient presents with complaints of ongoing clear rhinorrhea and left nostril blockage  He says his alan shirt is slighly damp at night  He has a slight cough which is worse in the am  No sob  He has been taking flonase and pseudophedrine  The following portions of the patient's history were reviewed and updated as appropriate: allergies, current medications, past family history, past medical history, past social history, past surgical history and problem list     Review of Systems   Constitutional: Negative for activity change, appetite change, chills, diaphoresis, fatigue, fever and unexpected weight change  Sweats   HENT: Positive for rhinorrhea  Negative for congestion, dental problem, drooling, ear discharge, ear pain, facial swelling, hearing loss, mouth sores, nosebleeds, postnasal drip, sinus pain, sinus pressure, sneezing, sore throat, tinnitus, trouble swallowing and voice change  Eyes: Negative for photophobia, pain, discharge, redness, itching and visual disturbance  Respiratory: Negative for apnea, choking, chest tightness, shortness of breath, wheezing and stridor      Cardiovascular: Negative for chest pain, palpitations and leg swelling  Gastrointestinal: Negative for abdominal distention, abdominal pain, anal bleeding, blood in stool, constipation, diarrhea, nausea, rectal pain and vomiting  Endocrine: Negative for cold intolerance, heat intolerance, polydipsia, polyphagia and polyuria  Genitourinary: Negative for decreased urine volume, difficulty urinating, dysuria, enuresis, flank pain, frequency, genital sores, hematuria and urgency  Musculoskeletal: Negative for arthralgias, back pain, gait problem, joint swelling, myalgias, neck pain and neck stiffness  Skin: Negative for color change, pallor, rash and wound  Allergic/Immunologic: Negative for environmental allergies, food allergies and immunocompromised state  Neurological: Negative for dizziness, tremors, seizures, syncope, facial asymmetry, speech difficulty, weakness, light-headedness, numbness and headaches  Hematological: Negative for adenopathy  Does not bruise/bleed easily  Psychiatric/Behavioral: Negative for agitation, self-injury, sleep disturbance and suicidal ideas  The patient is not nervous/anxious  Objective:      /84   Pulse 99   Resp 16   Ht 6' 2" (1 88 m)   Wt 90 7 kg (200 lb)   SpO2 (!) 83%   BMI 25 68 kg/m²          Physical Exam   Constitutional: He is oriented to person, place, and time  He appears well-developed and well-nourished  No distress  HENT:   Head: Normocephalic and atraumatic  Right Ear: External ear normal    Left Ear: External ear normal    Mouth/Throat: Oropharynx is clear and moist  No oropharyngeal exudate  Eyes: Pupils are equal, round, and reactive to light  Conjunctivae and EOM are normal  No scleral icterus  Neck: Normal range of motion  Neck supple  No JVD present  No tracheal deviation present  No thyromegaly present  Cardiovascular: Normal rate, regular rhythm and intact distal pulses  Exam reveals no gallop and no friction rub  No murmur heard    Pulmonary/Chest: Effort normal and breath sounds normal  No respiratory distress  He has no wheezes  He has no rales  He exhibits no tenderness  Abdominal: Soft  Bowel sounds are normal  He exhibits no distension and no mass  There is no tenderness  There is no rebound and no guarding  Genitourinary:   Genitourinary Comments: Nemg  Testes descended bilaterally without masses  No penile discharge or masses  Nl rectal tone  The prostate is small and smooth and without masses  Musculoskeletal: Normal range of motion  He exhibits no edema, tenderness or deformity  Lymphadenopathy:     He has no cervical adenopathy  Neurological: He is alert and oriented to person, place, and time  He has normal reflexes  No cranial nerve deficit  He exhibits normal muscle tone  Coordination normal    Skin: Skin is warm and dry  No rash noted  He is not diaphoretic  No erythema  No pallor  Psychiatric: He has a normal mood and affect   His behavior is normal  Judgment and thought content normal

## 2018-10-04 ENCOUNTER — TELEPHONE (OUTPATIENT)
Dept: INTERNAL MEDICINE CLINIC | Facility: CLINIC | Age: 53
End: 2018-10-04

## 2018-10-04 DIAGNOSIS — R09.81 NASAL SINUS CONGESTION: Primary | ICD-10-CM

## 2018-10-04 DIAGNOSIS — J06.9 UPPER RESPIRATORY TRACT INFECTION, UNSPECIFIED TYPE: ICD-10-CM

## 2018-10-04 RX ORDER — AMOXICILLIN AND CLAVULANATE POTASSIUM 875; 125 MG/1; MG/1
1 TABLET, FILM COATED ORAL EVERY 12 HOURS SCHEDULED
Qty: 20 TABLET | Refills: 0 | Status: SHIPPED | OUTPATIENT
Start: 2018-10-04 | End: 2018-10-14

## 2018-10-04 NOTE — TELEPHONE ENCOUNTER
Has he tried a nasal spray with the addition of something like Claritin, Allegra or Zyrtec? If he hasn't, I can order the nasal spray and he can get Claritin--generic, and try this for a week or so  I will still put in an ENT referral in the event that this does not help or he has already tried it    Let me know about the nasal spray

## 2018-10-04 NOTE — TELEPHONE ENCOUNTER
Spoke with pt  Saw dr solano  On 10/02  He is taking flonase and zytec nothing is working he does have yellow mucus

## 2018-10-04 NOTE — TELEPHONE ENCOUNTER
Pt would like a referral to see an ENT specialist  Rochelle Waggoner he's been having a lot of nasal congestion  He's been trying OTC medication but nothing seems to be helping  Pt aware Dr Tyrone Zheng not in the office until Monday, wants to know if a PA could address this   Please advise     [phone]

## 2018-10-12 ENCOUNTER — OFFICE VISIT (OUTPATIENT)
Dept: INTERNAL MEDICINE CLINIC | Facility: CLINIC | Age: 53
End: 2018-10-12
Payer: COMMERCIAL

## 2018-10-12 VITALS
DIASTOLIC BLOOD PRESSURE: 92 MMHG | SYSTOLIC BLOOD PRESSURE: 140 MMHG | TEMPERATURE: 97.6 F | HEART RATE: 80 BPM | BODY MASS INDEX: 25.55 KG/M2 | OXYGEN SATURATION: 98 % | WEIGHT: 199 LBS

## 2018-10-12 DIAGNOSIS — J31.0 OTHER RHINITIS: Primary | ICD-10-CM

## 2018-10-12 DIAGNOSIS — R09.82 POST-NASAL DRIP: ICD-10-CM

## 2018-10-12 DIAGNOSIS — R09.81 NASAL CONGESTION: ICD-10-CM

## 2018-10-12 PROCEDURE — 99214 OFFICE O/P EST MOD 30 MIN: CPT | Performed by: PHYSICIAN ASSISTANT

## 2018-10-12 PROCEDURE — 1036F TOBACCO NON-USER: CPT | Performed by: PHYSICIAN ASSISTANT

## 2018-10-12 NOTE — PROGRESS NOTES
Assessment/Plan:    Allergic rhinitis/rhinorrhea/severe nasal congestion-he will go back on Zyrtec daily, avoid Sudafed, Flonase nasal spray every other day, nasal saline as needed as much as he needs it  He should also try and Neti pot  He will keep his appointment with Dr Deb Walton    No problem-specific [facility] notes found for this encounter  There are no diagnoses linked to this encounter  Subjective:      Patient ID: Dunia Cole is a 52 y o  male  The patient has been here for nasal congestion, cerumen impaction and respiratory symptoms several times in the last few weeks  He was here last week where was felt that the medications he was taking was drying him out too much  He was taking Zyrtec, nasal spray and Sudafed  He was told by Dr solano to stop all of those and just use saline nasal spray  He was advised that if his symptoms did not improve to call the office and we would order antibiotic  That is in fact what happened, he did call back and we ordered Augmentin  He has a few pills left  He is here today now because his right nostril is completely clear but his left nostril is totally clogged every once in a while he gets an uncontrollable drainage of clear mucus running out of his nose  The patient does have an appointment with ENT in about a week and half but he is looking for some relief  He is using nasal saline at will  The following portions of the patient's history were reviewed and updated as appropriate: allergies, current medications, past family history, past medical history, past social history, past surgical history and problem list     Review of Systems   Constitutional: Negative for chills  HENT: Positive for congestion and rhinorrhea  Negative for ear pain, postnasal drip, sinus pain, sinus pressure, sneezing and sore throat  Respiratory: Negative for cough  Cardiovascular: Negative for chest pain and palpitations     Gastrointestinal: Negative for abdominal pain, diarrhea and nausea  Objective:      /92 (BP Location: Left arm, Patient Position: Sitting, Cuff Size: Standard)   Pulse 80   Temp 97 6 °F (36 4 °C)   Wt 90 3 kg (199 lb)   SpO2 98%   BMI 25 55 kg/m²          Physical Exam   Constitutional: He appears well-developed and well-nourished  HENT:   Head: Normocephalic and atraumatic  Right Ear: External ear normal    Left Ear: External ear normal    Nose: Rhinorrhea present  Mouth/Throat: Oropharynx is clear and moist  No oropharyngeal exudate  Neck: Normal range of motion  Cardiovascular: Normal rate and regular rhythm  Pulmonary/Chest: Effort normal and breath sounds normal  No respiratory distress  Abdominal: Soft  Bowel sounds are normal    Lymphadenopathy:     He has no cervical adenopathy

## 2019-01-11 ENCOUNTER — HOSPITAL ENCOUNTER (OUTPATIENT)
Dept: CT IMAGING | Facility: HOSPITAL | Age: 54
Discharge: HOME | End: 2019-01-11
Attending: UROLOGY
Payer: COMMERCIAL

## 2019-01-11 DIAGNOSIS — N20.0 KIDNEY CALCULI: ICD-10-CM

## 2019-01-11 PROCEDURE — 74176 CT ABD & PELVIS W/O CONTRAST: CPT

## 2019-01-17 ENCOUNTER — OFFICE VISIT (OUTPATIENT)
Dept: UROLOGY | Facility: CLINIC | Age: 54
End: 2019-01-17
Payer: COMMERCIAL

## 2019-01-17 VITALS
DIASTOLIC BLOOD PRESSURE: 66 MMHG | HEART RATE: 82 BPM | HEIGHT: 74 IN | SYSTOLIC BLOOD PRESSURE: 132 MMHG | BODY MASS INDEX: 26.41 KG/M2 | WEIGHT: 205.8 LBS

## 2019-01-17 DIAGNOSIS — Z12.5 PROSTATE CANCER SCREENING: ICD-10-CM

## 2019-01-17 DIAGNOSIS — N20.0 NEPHROLITHIASIS: Primary | ICD-10-CM

## 2019-01-17 PROCEDURE — 99213 OFFICE O/P EST LOW 20 MIN: CPT | Performed by: PHYSICIAN ASSISTANT

## 2019-01-17 NOTE — PROGRESS NOTES
1  Nephrolithiasis  US kidney and bladder   2  Prostate cancer screening  PSA, Total Screen     Assessment and plan:       1  Recurrent nephrolithiasis - managed by Dr Stefanie Rich  - we discussed that he has a stable 4 mm right renal nonobstructing calculi  We discussed that this stone is within passable size  Patient elects for observation  - we discussed the importance of proper hydration and dietary modifications to minimize future stone formation  - patient follow-up in 1 year with an ultrasound of the kidney and bladder PSA prior to visit for continued surveillance  Gonzalez Robles PA-C      Chief Complaint     Chief Complaint   Patient presents with    Nephrolithiasis       History of Present Illness     Jorge Huffman is a 52 y o  male patient of Dr Stefanie Rich with a history of recurrent nephrolithiasis presenting for follow-up  Patient has a history of recurrent nephrolithiasis  Patient has been able to pass most of his stones on his own  Patient had a recent CT stone study (01/11/2019)  This revealed a stable 4 mm nonobstructing right lower pole calculus  There was no left intrarenal calculi  There is no evidence of obstructive uropathy  Urinary bladder was unremarkable  Stable left adrenal adenoma noted  Patient has been doing well from a urinary perspective  He feels like he has a good stream, feels empty after urination, has nocturia 1-2 times nightly  Denies any dysuria, gross hematuria, urgency, hesitancy, or urinary infections  Patient's most recent PSA is 0 4 (09/20/2018)  Patient reports that he just had a prostate examination within the past month with his primary care provider in [place] repeat prostate exam today in the office        Laboratory     Lab Results   Component Value Date    CREATININE 1 70 (H) 05/11/2016       Lab Results   Component Value Date    PSA 0 4 09/20/2018     Review of Systems     Review of Systems   Constitutional: Negative for activity change, appetite change, chills, diaphoresis, fatigue, fever and unexpected weight change  Respiratory: Negative for chest tightness and shortness of breath  Cardiovascular: Negative for chest pain, palpitations and leg swelling  Gastrointestinal: Negative for abdominal distention, abdominal pain, constipation, diarrhea, nausea and vomiting  Genitourinary: Negative for decreased urine volume, difficulty urinating, dysuria, enuresis, flank pain, frequency, genital sores, hematuria and urgency  Musculoskeletal: Negative for back pain, gait problem and myalgias  Skin: Negative for color change, pallor, rash and wound  Psychiatric/Behavioral: Negative for behavioral problems  The patient is not nervous/anxious  Allergies     No Known Allergies    Physical Exam     Physical Exam   Constitutional: He is oriented to person, place, and time  He appears well-developed and well-nourished  No distress  HENT:   Head: Normocephalic and atraumatic  Eyes: Conjunctivae are normal    Neck: Normal range of motion  No tracheal deviation present  Pulmonary/Chest: Effort normal    Musculoskeletal: Normal range of motion  He exhibits no edema or deformity  Neurological: He is alert and oriented to person, place, and time  Skin: Skin is warm and dry  No rash noted  He is not diaphoretic  No erythema  Psychiatric: He has a normal mood and affect   His behavior is normal          Vital Signs     Vitals:    01/17/19 1400   BP: 132/66   Pulse: 82   Weight: 93 4 kg (205 lb 12 8 oz)   Height: 6' 2" (1 88 m)         Current Medications       Current Outpatient Prescriptions:     fluticasone (FLONASE) 50 mcg/act nasal spray, 2 sprays into each nostril daily, Disp: 16 g, Rfl: 1    Multiple Vitamin (MULTIVITAMIN) capsule, Take 1 capsule by mouth daily, Disp: , Rfl:     naproxen sodium (ALEVE) 220 MG tablet, Take by mouth, Disp: , Rfl:       Active Problems     Patient Active Problem List   Diagnosis    Kidney stones    Benign essential hypertension    Chronic kidney disease (CKD), stage III (moderate) (HCC)    HTN, goal below 140/90    Left ureteral calculus    Macrocytosis    Mitral valve disorder    Bunion, right foot    Seasonal allergic rhinitis due to pollen    Impacted cerumen of left ear    Screening for prostate cancer    Screen for colon cancer    Impaired fasting glucose       Past Medical History     Past Medical History:   Diagnosis Date    History of kidney problems     Hypertension     resolved    Kidney calculi     Mitral valve prolapse     Thrombocytosis ([address] )     last assessed: 6/14/2016       Surgical History     Past Surgical History:   Procedure Laterality Date    HAND SURGERY      ORTHOPEDIC SURGERY Left     hand fracture repaired    IA CYSTO/URETERO W/LITHOTRIPSY &INDWELL STENT INSRT Right 6/21/2016    Procedure: CYSTOSCOPY; URETEROSCOPY;  RETROGRADE PYELOGRAM; STENT ;  Surgeon: Teressa Easley MD;  [facility];  Service: Urology    SHOULDER ARTHROSCOPY W/ LABRAL REPAIR      SHOULDER SURGERY         Family History     Family History   Problem Relation Age of Onset    Stomach cancer Father     Hypertension Father         benign essential    Diverticulosis Father     Breast cancer Sister     Diabetes Maternal Grandmother         mellitus    Ovarian cancer Paternal Grandmother     Ovarian cancer Child     Breast cancer Maternal Aunt     Lung cancer Paternal Uncle     Pancreatic cancer Paternal Uncle     Lung cancer Cousin      Social History     Social History     Radiology     CT ABDOMEN AND PELVIS WITHOUT IV CONTRAST - LOW DOSE RENAL STONE      INDICATION:   N20 0: Calculus of kidney    Follow-up      COMPARISON:  CT abdomen/pelvis dated March 20, 2017      TECHNIQUE:  Low dose thin section CT examination of the abdomen and pelvis was performed without intravenous or oral contrast according to a protocol specifically designed to evaluate for urinary tract calculus  Axial, sagittal, and coronal 2D   reformatted images were created from the source data and submitted for interpretation  Evaluation for pathology in the abdomen and pelvis that is unrelated to urinary tract calculi is limited       Radiation dose length product (DLP) for this visit:  282 mGy-cm   This examination, like all CT scans performed in the [place], was performed utilizing techniques to minimize radiation dose exposure, including the use of iterative   reconstruction and automated exposure control       FINDINGS:     RIGHT KIDNEY AND URETER:  There is a stable 4 mm nonobstructing lower pole calculus  No hydronephrosis or hydroureter      LEFT KIDNEY AND URETER:  No urinary tract calculi  No hydronephrosis or hydroureter      URINARY BLADDER:   Unremarkable       No significant abnormality in the visualized lung bases      Limited low radiation dose noncontrast CT evaluation demonstrates no clinically significant abnormality of liver, spleen or pancreas  There is a stable 18 mm left adrenal nodule most compatible with an adenoma  No calcified gallstones or gallbladder wall thickening noted  No ascites or bulky lymphadenopathy on this limited noncontrast study  Colonic diverticula are noted, without evidence to suggest acute diverticulitis  Visualized bowel appears otherwise unremarkable  The appendix is well seen and there is no evidence of acute appendicitis  No acute fracture or destructive osseous lesion is identified         IMPRESSION:     No hydronephrosis or hydroureter        Stable 4 mm right renal nonobstructing intrarenal calculus      Scattered colonic diverticulosis with no inflammatory changes present to suggest acute diverticulitis       Stable left adrenal adenoma

## 2019-03-15 ENCOUNTER — TELEPHONE (OUTPATIENT)
Dept: INTERNAL MEDICINE CLINIC | Facility: CLINIC | Age: 54
End: 2019-03-15

## 2019-04-30 ENCOUNTER — OFFICE VISIT (OUTPATIENT)
Dept: INTERNAL MEDICINE CLINIC | Facility: CLINIC | Age: 54
End: 2019-04-30
Payer: COMMERCIAL

## 2019-04-30 VITALS
BODY MASS INDEX: 25.63 KG/M2 | SYSTOLIC BLOOD PRESSURE: 124 MMHG | WEIGHT: 193.4 LBS | DIASTOLIC BLOOD PRESSURE: 80 MMHG | HEART RATE: 81 BPM | HEIGHT: 73 IN | OXYGEN SATURATION: 96 %

## 2019-04-30 DIAGNOSIS — Z00.00 ADULT GENERAL MEDICAL EXAMINATION: Primary | ICD-10-CM

## 2019-04-30 DIAGNOSIS — Z11.59 NEED FOR HEPATITIS C SCREENING TEST: ICD-10-CM

## 2019-04-30 DIAGNOSIS — Z13.6 SCREENING FOR CARDIOVASCULAR CONDITION: ICD-10-CM

## 2019-04-30 DIAGNOSIS — R73.01 IMPAIRED FASTING GLUCOSE: ICD-10-CM

## 2019-04-30 PROBLEM — N20.1 LEFT URETERAL CALCULUS: Status: RESOLVED | Noted: 2017-03-07 | Resolved: 2019-04-30

## 2019-04-30 PROBLEM — N18.30 CHRONIC KIDNEY DISEASE (CKD), STAGE III (MODERATE) (HCC): Chronic | Status: RESOLVED | Noted: 2017-11-02 | Resolved: 2019-04-30

## 2019-04-30 PROBLEM — H61.22 IMPACTED CERUMEN OF LEFT EAR: Status: RESOLVED | Noted: 2018-09-18 | Resolved: 2019-04-30

## 2019-04-30 PROBLEM — Z12.5 SCREENING FOR PROSTATE CANCER: Status: RESOLVED | Noted: 2018-09-18 | Resolved: 2019-04-30

## 2019-04-30 PROBLEM — Z12.11 SCREEN FOR COLON CANCER: Status: RESOLVED | Noted: 2018-09-18 | Resolved: 2019-04-30

## 2019-04-30 PROBLEM — N18.30 CHRONIC KIDNEY DISEASE (CKD), STAGE III (MODERATE) (HCC): Chronic | Status: ACTIVE | Noted: 2017-11-02

## 2019-04-30 PROCEDURE — 99396 PREV VISIT EST AGE 40-64: CPT | Performed by: INTERNAL MEDICINE

## 2019-10-08 ENCOUNTER — TRANSCRIBE ORDERS (OUTPATIENT)
Dept: ADMINISTRATIVE | Facility: HOSPITAL | Age: 54
End: 2019-10-08

## 2019-10-08 ENCOUNTER — APPOINTMENT (OUTPATIENT)
Dept: LAB | Facility: HOSPITAL | Age: 54
End: 2019-10-08
Payer: COMMERCIAL

## 2019-10-08 ENCOUNTER — OFFICE VISIT (OUTPATIENT)
Dept: LAB | Facility: HOSPITAL | Age: 54
End: 2019-10-08
Payer: COMMERCIAL

## 2019-10-08 DIAGNOSIS — M75.122 COMPLETE TEAR OF LEFT ROTATOR CUFF, UNSPECIFIED WHETHER TRAUMATIC: ICD-10-CM

## 2019-10-08 DIAGNOSIS — M75.122 COMPLETE TEAR OF LEFT ROTATOR CUFF, UNSPECIFIED WHETHER TRAUMATIC: Primary | ICD-10-CM

## 2019-10-08 LAB
ANION GAP SERPL CALCULATED.3IONS-SCNC: 11 MMOL/L (ref 4–13)
ATRIAL RATE: 66 BPM
BASOPHILS # BLD MANUAL: 0.09 THOUSAND/UL (ref 0–0.1)
BASOPHILS NFR MAR MANUAL: 1 % (ref 0–1)
BUN SERPL-MCNC: 19 MG/DL (ref 5–25)
CALCIUM SERPL-MCNC: 8.7 MG/DL (ref 8.3–10.1)
CHLORIDE SERPL-SCNC: 105 MMOL/L (ref 100–108)
CO2 SERPL-SCNC: 24 MMOL/L (ref 21–32)
CREAT SERPL-MCNC: 1.05 MG/DL (ref 0.6–1.3)
EOSINOPHIL # BLD MANUAL: 0 THOUSAND/UL (ref 0–0.4)
EOSINOPHIL NFR BLD MANUAL: 0 % (ref 0–6)
ERYTHROCYTE [DISTWIDTH] IN BLOOD BY AUTOMATED COUNT: 11.5 % (ref 11.6–15.1)
GFR SERPL CREATININE-BSD FRML MDRD: 91 ML/MIN/1.73SQ M
GLUCOSE P FAST SERPL-MCNC: 91 MG/DL (ref 65–99)
HCT VFR BLD AUTO: 41.2 % (ref 36.5–49.3)
HGB BLD-MCNC: 13.9 G/DL (ref 12–17)
LG PLATELETS BLD QL SMEAR: PRESENT
LYMPHOCYTES # BLD AUTO: 5.1 THOUSAND/UL (ref 0.6–4.47)
LYMPHOCYTES # BLD AUTO: 54 % (ref 14–44)
MCH RBC QN AUTO: 36 PG (ref 26.8–34.3)
MCHC RBC AUTO-ENTMCNC: 33.7 G/DL (ref 31.4–37.4)
MCV RBC AUTO: 107 FL (ref 82–98)
MONOCYTES # BLD AUTO: 0.38 THOUSAND/UL (ref 0–1.22)
MONOCYTES NFR BLD: 4 % (ref 4–12)
NEUTROPHILS # BLD MANUAL: 3.59 THOUSAND/UL (ref 1.85–7.62)
NEUTS SEG NFR BLD AUTO: 38 % (ref 43–75)
NRBC BLD AUTO-RTO: 0 /100 WBCS
NRBC BLD AUTO-RTO: 1 /100 WBC (ref 0–2)
P AXIS: 62 DEGREES
PLATELET # BLD AUTO: 326 THOUSANDS/UL (ref 149–390)
PLATELET BLD QL SMEAR: ADEQUATE
PMV BLD AUTO: 10.1 FL (ref 8.9–12.7)
POTASSIUM SERPL-SCNC: 3.6 MMOL/L (ref 3.5–5.3)
PR INTERVAL: 166 MS
QRS AXIS: 36 DEGREES
QRSD INTERVAL: 92 MS
QT INTERVAL: 394 MS
QTC INTERVAL: 413 MS
RBC # BLD AUTO: 3.86 MILLION/UL (ref 3.88–5.62)
SODIUM SERPL-SCNC: 140 MMOL/L (ref 136–145)
T WAVE AXIS: 67 DEGREES
TOTAL CELLS COUNTED SPEC: 100
VARIANT LYMPHS # BLD AUTO: 3 %
VENTRICULAR RATE: 66 BPM
WBC # BLD AUTO: 9.44 THOUSAND/UL (ref 4.31–10.16)

## 2019-10-08 PROCEDURE — 93010 ELECTROCARDIOGRAM REPORT: CPT | Performed by: INTERNAL MEDICINE

## 2019-10-08 PROCEDURE — 36415 COLL VENOUS BLD VENIPUNCTURE: CPT

## 2019-10-08 PROCEDURE — 93005 ELECTROCARDIOGRAM TRACING: CPT

## 2019-10-08 PROCEDURE — 85007 BL SMEAR W/DIFF WBC COUNT: CPT

## 2019-10-08 PROCEDURE — 80048 BASIC METABOLIC PNL TOTAL CA: CPT

## 2019-10-08 PROCEDURE — 85027 COMPLETE CBC AUTOMATED: CPT

## 2019-10-09 ENCOUNTER — TELEPHONE (OUTPATIENT)
Dept: INTERNAL MEDICINE CLINIC | Facility: CLINIC | Age: 54
End: 2019-10-09

## 2019-10-09 NOTE — TELEPHONE ENCOUNTER
----- Message from Jacky Galicia DO sent at 10/9/2019  9:46 AM EDT -----  No significant abnormalities on lab work   Labs are stable

## 2019-10-10 NOTE — TELEPHONE ENCOUNTER
Re:   Lab results-stable  Provider's message/results/instructions relayed in full detail  LVM asking pt to return call w/any questions

## 2019-12-20 ENCOUNTER — OFFICE VISIT (OUTPATIENT)
Dept: INTERNAL MEDICINE CLINIC | Facility: CLINIC | Age: 54
End: 2019-12-20
Payer: COMMERCIAL

## 2019-12-20 VITALS
OXYGEN SATURATION: 95 % | HEART RATE: 75 BPM | HEIGHT: 72 IN | DIASTOLIC BLOOD PRESSURE: 72 MMHG | WEIGHT: 206.8 LBS | BODY MASS INDEX: 28.01 KG/M2 | SYSTOLIC BLOOD PRESSURE: 112 MMHG

## 2019-12-20 DIAGNOSIS — T14.8XXA MUSCLE STRAIN: ICD-10-CM
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
## 2019-12-20 PROCEDURE — 99213 OFFICE O/P EST LOW 20 MIN: CPT | Performed by: INTERNAL MEDICINE

## 2019-12-20 RX ORDER — CYCLOBENZAPRINE HCL 5 MG
5 TABLET ORAL 3 TIMES DAILY PRN
Qty: 30 TABLET | Refills: 0 | Status: SHIPPED | OUTPATIENT
Start: 2019-12-20 | End: 2021-03-23
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
## 2019-12-27 ENCOUNTER — OFFICE VISIT (OUTPATIENT)
Dept: INTERNAL MEDICINE CLINIC | Facility: CLINIC | Age: 54
End: 2019-12-27
Payer: COMMERCIAL

## 2019-12-27 VITALS
DIASTOLIC BLOOD PRESSURE: 94 MMHG | SYSTOLIC BLOOD PRESSURE: 122 MMHG | BODY MASS INDEX: 27.17 KG/M2 | HEART RATE: 80 BPM | TEMPERATURE: 98.2 F | HEIGHT: 72 IN | RESPIRATION RATE: 14 BRPM | WEIGHT: 200.6 LBS

## 2019-12-27 DIAGNOSIS — G44.321 INTRACTABLE CHRONIC POST-TRAUMATIC HEADACHE: Primary | ICD-10-CM

## 2019-12-27 DIAGNOSIS — T14.8XXA MUSCLE STRAIN: ICD-10-CM

## 2019-12-27 PROCEDURE — 99214 OFFICE O/P EST MOD 30 MIN: CPT | Performed by: NURSE PRACTITIONER
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
## 2019-12-27 NOTE — PATIENT INSTRUCTIONS
Post Concussion Syndrome   WHAT YOU NEED TO KNOW:   Post-concussion syndrome (PCS) is a group of symptoms that affect your nerves, thinking, and behavior  PCS develops shortly after a concussion and can last for weeks to months  DISCHARGE INSTRUCTIONS:   Call 911 or have someone else call for any of the following:   · You have a seizure  · You have trouble breathing  · You are not responding or you cannot be woken  Return to the emergency department if:   · You have a sudden headache that seems different or much worse than your usual headaches  · You cannot stop vomiting  · You have sudden changes in your vision  Contact your healthcare provider if:   · You have nausea or are vomiting  · You have trouble concentrating  · You have difficulty speaking or thinking  · Your symptoms get worse  · You have questions or concerns about your condition or care  Medicines: You may  need any of the following:  · Acetaminophen  decreases pain  It is available without a doctor's order  Ask how much to take and how often to take it  Follow directions  Acetaminophen can cause liver damage if not taken correctly  · NSAIDs,  such as ibuprofen, help decrease swelling, pain, and fever  This medicine is available without a doctor's order  Follow directions  NSAIDs can cause stomach bleeding or kidney problems if not taken correctly  If you take blood thinner medicine, always ask if NSAIDs are safe for you  · Antidepressants  may be given for depression or sleep problems  · Migraine medicines  may be given for migraine headaches  · NSAIDs , such as ibuprofen, help decrease swelling, pain, and fever  This medicine is available with or without a doctor's order  NSAIDs can cause stomach bleeding or kidney problems in certain people  If you take blood thinner medicine, always ask if NSAIDs are safe for you  Always read the medicine label and follow directions   Do not give these medicines to children under 10months of age without direction from your child's healthcare provider  Follow up with your healthcare provider as directed: Your healthcare provider may refer you to psychiatrist, a neurologist, or a substance abuse counselor  Write down your questions so you remember to ask them during your visits  Prevent PCS:   · Make your home safe  Home safety measures can help prevent head injuries that could lead to a concussion  Install handrails for every staircase  Put soft bumpers on furniture edges and corners  Secure furniture, such as dressers and book cases so they do not fall over  · Always wear a seatbelt in the car  This helps decrease your risk for a head injury if you are in a car accident  · Wear protective sports equipment that fits properly  Helmets help decrease your risk for a serious brain injury  Talk to your healthcare provider about other ways that you can decrease your risk for a concussion if you play sports  Manage your symptoms:   · Rest  from physical and mental activities as directed  Mental activities need you to think, concentrate, and pay attention  Rest will help you recover from your concussion  Ask your healthcare provider when you can return to school and other daily activities  · Go to therapy  as directed  A cognitive behavioral therapist teaches you skills to help with any thinking and behavior problems you may have  An occupational therapist teaches your skills to help with daily activities  · Do not participate in sports or physical activities  until your healthcare provider says it is okay  These activities could make your symptoms worse or lead to another concussion  Your healthcare provider will tell you when it is okay to return to sports or physical activities  © 2017 [address] [employer] Information is for End User's use only and may not be sold, redistributed or otherwise used for commercial purposes   All illustrations and images included in [employer] are the copyrighted property of [employer] , [employer]  or [employer]ino Cantu  The above information is an  only  It is not intended as medical advice for individual conditions or treatments  Talk to your doctor, nurse or pharmacist before following any medical regimen to see if it is safe and effective for you

## 2020-01-10 ENCOUNTER — HOSPITAL ENCOUNTER (OUTPATIENT)
Dept: ULTRASOUND IMAGING | Facility: HOSPITAL | Age: 55
Discharge: HOME | End: 2020-01-10
Payer: COMMERCIAL

## 2020-01-10 DIAGNOSIS — N20.0 NEPHROLITHIASIS: ICD-10-CM

## 2020-01-10 PROCEDURE — 76770 US EXAM ABDO BACK WALL COMP: CPT

## 2020-01-16 ENCOUNTER — OFFICE VISIT (OUTPATIENT)
Dept: UROLOGY | Facility: CLINIC | Age: 55
End: 2020-01-16
Payer: COMMERCIAL

## 2020-01-16 VITALS
HEART RATE: 83 BPM | HEIGHT: 72 IN | WEIGHT: 199.8 LBS | BODY MASS INDEX: 27.06 KG/M2 | DIASTOLIC BLOOD PRESSURE: 80 MMHG | SYSTOLIC BLOOD PRESSURE: 128 MMHG

## 2020-01-16 DIAGNOSIS — Z12.5 PROSTATE CANCER SCREENING: Primary | ICD-10-CM

## 2020-01-16 PROCEDURE — 3008F BODY MASS INDEX DOCD: CPT | Performed by: INTERNAL MEDICINE

## 2020-01-16 PROCEDURE — 3074F SYST BP LT 130 MM HG: CPT | Performed by: UROLOGY

## 2020-01-16 PROCEDURE — 99214 OFFICE O/P EST MOD 30 MIN: CPT | Performed by: UROLOGY

## 2020-01-16 PROCEDURE — 3079F DIAST BP 80-89 MM HG: CPT | Performed by: UROLOGY

## 2020-01-16 NOTE — PROGRESS NOTES
Referring Physician: Jacky Galicia DO  A copy of this note was sent to the referring physician  Diagnoses and all orders for this visit:    Prostate cancer screening  -     PSA, Total Screen; Future            Assessment and plan:       1  nephrolithiasis  - 5 mm asymptomatic intrarenal calculus observed times years without pain    2  Prostate cancer screening  - negative rectal examination  -  PSA less than 1     output is doing incredibly well from a urologic perspective  He remains asymptomatic from his stone appears generally stable on imaging  We also discussed prostate cancer screening  His rectal exam today was negative  He is due for PSA  At this point I recommended keeping her follow-up open  He does not need regular imaging for his asymptomatic intrarenal stone  He knows the signs and symptoms to look out for if he develops pain  So invited him to call my office and he can be scheduled for an updated CT scan and office visit  I have also encouraged him to pursue regular prostate cancer screening with Dr Zaida Winkler forward  We remain happy to see him back should the need arise    Inessa Do MD      Chief Complaint      stone follow-up      History of Present Illness     Nixon Lee is a 62 y o  Male returns in follow-up for kidney stone and prostate cancer  Screening  He continues to do well from a urologic perspective  He has had a known intrarenal calculus which has been observe for the past number of years  He has been asymptomatic and prefers to avoid surgical intervention unless he develops pain  One year ago he underwent a CT scan which documented a solitary stone measuring 6 mm  He presents today with an updated ultrasound  He remains completely asymptomatic  He continues to do well from a voiding standpoint  He has no bothersome lower urinary tract symptoms during the day or at night      Detailed Urologic History     - please refer to HPI    Review of Systems     Review of Systems   Constitutional: Negative for activity change and fatigue  HENT: Negative for congestion  Eyes: Negative for visual disturbance  Respiratory: Negative for shortness of breath and wheezing  Cardiovascular: Negative for chest pain and leg swelling  Gastrointestinal: Negative for abdominal pain  Genitourinary: Negative for dysuria, flank pain, hematuria and urgency  Musculoskeletal: Negative for back pain  Allergic/Immunologic: Negative for immunocompromised state  Neurological: Negative for dizziness and numbness  Psychiatric/Behavioral: Negative for dysphoric mood  All other systems reviewed and are negative  AUA SYMPTOM SCORE      Most Recent Value   AUA SYMPTOM SCORE   How often have you had a sensation of not emptying your bladder completely after you finished urinating? 1   How often have you had to urinate again less than two hours after you finished urinating? 2   How often have you found you stopped and started again several times when you urinate?  0   How often have you found it difficult to postpone urination? 0   How often have you had a weak urinary stream?  1   How often have you had to push or strain to begin urination? 0   How many times did you most typically get up to urinate from the time you went to bed at night until the time you got up in the morning? 2   Quality of Life: If you were to spend the rest of your life with your urinary condition just the way it is now, how would you feel about that?  2   AUA SYMPTOM SCORE  6            Allergies     No Known Allergies    Physical Exam     Physical Exam   Constitutional: He is oriented to person, place, and time  He appears well-developed and well-nourished  No distress  HENT:   Head: Normocephalic and atraumatic  Eyes: EOM are normal    Neck: Normal range of motion     Cardiovascular:     Negative lower extremity edema   Pulmonary/Chest: Effort normal and breath sounds normal    Abdominal: Soft  Genitourinary:   Genitourinary Comments:  Rectal exam reveals a 30 g prostate no nodules or induration   Musculoskeletal: Normal range of motion  Neurological: He is alert and oriented to person, place, and time  Skin: Skin is warm  Psychiatric: He has a normal mood and affect   His behavior is normal            Vital Signs  Vitals:    01/16/20 0954   BP: 128/80   BP Location: Left arm   Patient Position: Sitting   Cuff Size: Adult   Pulse: 83   Weight: 90 6 kg (199 lb 12 8 oz)   Height: 6' (1 829 m)         Current Medications       Current Outpatient Medications:     fluticasone (FLONASE) 50 mcg/act nasal spray, 2 sprays into each nostril daily (Patient taking differently: 2 sprays into each nostril as needed ), Disp: 16 g, Rfl: 1    Multiple Vitamin (MULTIVITAMIN) capsule, Take 1 capsule by mouth daily, Disp: , Rfl:     cyclobenzaprine (FLEXERIL) 5 mg tablet, Take 1 tablet (5 mg total) by mouth 3 (three) times a day as needed for muscle spasms (Patient not taking: Reported on 1/16/2020), Disp: 30 tablet, Rfl: 0    naproxen sodium (ALEVE) 220 MG tablet, Take 220 mg by mouth as needed , Disp: , Rfl:       Active Problems     Patient Active Problem List   Diagnosis    Macrocytosis    Mitral valve disorder    Bunion, right foot    Seasonal allergic rhinitis due to pollen    Impaired fasting glucose         Past Medical History     Past Medical History:   Diagnosis Date    Hypertension     resolved    Mitral valve prolapse     Neutropenia (HCC)     Thrombocytosis (HCC)     last assessed: 6/14/2016         Surgical History     Past Surgical History:   Procedure Laterality Date    HAND SURGERY      ORTHOPEDIC SURGERY Left     hand fracture repaired    [place] CYSTO/URETERO W/LITHOTRIPSY &INDWELL STENT INSRT Right 6/21/2016    Procedure: CYSTOSCOPY; URETEROSCOPY;  RETROGRADE PYELOGRAM; STENT ;  Surgeon: Justo Galarza MD;  [facility];  Service: Urology  SHOULDER ARTHROSCOPY W/ LABRAL REPAIR      SHOULDER SURGERY           Family History     Family History   Problem Relation Age of Onset    Stomach cancer Father     Hypertension Father         benign essential    Diverticulosis Father     Breast cancer Sister     Diabetes Maternal Grandmother         mellitus    Ovarian cancer Paternal Grandmother     Ovarian cancer Child     Breast cancer Maternal Aunt     Lung cancer Paternal Uncle     Pancreatic cancer Paternal Uncle     Lung cancer Cousin          Social History     Social History     Social History     Tobacco Use   Smoking Status Former Smoker    Packs/day: 0 03    Years: 3 00    Pack years: 0 09    Types: Cigarettes    Last attempt to quit: 1999    Years since quittin 7   Smokeless Tobacco Never Used         Pertinent Lab Values     Lab Results   Component Value Date    CREATININE 1 05 10/08/2019       Lab Results   Component Value Date    PSA 0 4 2018       @RESULTRCNT(1H])@      Pertinent Imaging     FINDINGS:     KIDNEYS:  Symmetric and normal size  Right kidney:  10 8 x 5 2 cm  Left kidney:  11 1 x 5 4 cm      Right kidney  Normal echogenicity and contour  No suspicious masses detected  No hydronephrosis  Right renal calculi measuring up to 10 mm are present  No perinephric fluid collections      Left kidney  Normal echogenicity and contour  No suspicious masses detected  No hydronephrosis  No shadowing calculi  No perinephric fluid collections      URETERS:  Nonvisualized      BLADDER:   Normally distended  No focal thickening or mass lesions  Bilateral ureteral jets detected         IMPRESSION:     Nonobstructing right renal calculi       Portions of the record may have been created with voice recognition software   Occasional wrong word or "sound a like" substitutions may have occurred due to the inherent limitations of voice recognition software   Read the chart carefully and recognize, using context, where substitutions have occurred

## 2020-01-22 ENCOUNTER — HOSPITAL ENCOUNTER (OUTPATIENT)
Dept: CT IMAGING | Facility: CLINIC | Age: 55
Discharge: HOME | End: 2020-01-22
Payer: COMMERCIAL

## 2020-01-22 DIAGNOSIS — G44.321 INTRACTABLE CHRONIC POST-TRAUMATIC HEADACHE: ICD-10-CM

## 2020-01-22 PROCEDURE — 70450 CT HEAD/BRAIN W/O DYE: CPT

## 2020-01-24 ENCOUNTER — TELEPHONE (OUTPATIENT)
Dept: INTERNAL MEDICINE CLINIC | Facility: CLINIC | Age: 55
End: 2020-01-24

## 2020-01-24 NOTE — TELEPHONE ENCOUNTER
----- Message from Mamie Wyatt, [address]  sent at 1/24/2020 12:33 PM EST -----  Please call patient and make him aware that the CT scan of his head was negative

## 2020-02-21 ENCOUNTER — APPOINTMENT (OUTPATIENT)
Dept: LAB | Facility: HOSPITAL | Age: 55
End: 2020-02-21
Attending: UROLOGY
Payer: COMMERCIAL

## 2020-02-21 DIAGNOSIS — Z12.5 PROSTATE CANCER SCREENING: ICD-10-CM

## 2020-02-21 LAB — PSA SERPL-MCNC: 0.5 NG/ML (ref 0–4)

## 2020-02-21 PROCEDURE — G0103 PSA SCREENING: HCPCS

## 2020-02-21 PROCEDURE — 36415 COLL VENOUS BLD VENIPUNCTURE: CPT

## 2020-03-26 ENCOUNTER — TELEMEDICINE (OUTPATIENT)
Dept: INTERNAL MEDICINE CLINIC | Facility: CLINIC | Age: 55
End: 2020-03-26
Payer: COMMERCIAL

## 2020-03-26 DIAGNOSIS — R10.13 EPIGASTRIC PAIN: Primary | ICD-10-CM

## 2020-03-26 PROCEDURE — 99212 OFFICE O/P EST SF 10 MIN: CPT | Performed by: INTERNAL MEDICINE

## 2020-03-26 RX ORDER — PANTOPRAZOLE SODIUM 40 MG/1
40 TABLET, DELAYED RELEASE ORAL
Qty: 30 TABLET | Refills: 3 | Status: SHIPPED | OUTPATIENT
Start: 2020-03-26 | End: 2021-01-06

## 2020-03-26 NOTE — PROGRESS NOTES
Virtual Regular Visit    Assessment/Plan     1  Epigastric pain    Discussed diagnostic possibilities  Suspect possible gastritis  Avoid NSAIDs  Recommend pantoprazole  If symptoms not improving, call us back  - pantoprazole (PROTONIX) 40 mg tablet; Take 1 tablet (40 mg total) by mouth daily before breakfast  Dispense: 30 tablet; Refill: 3     BMI Counseling:    BMI Readings from Last 1 Encounters:   01/16/20 27 10 kg/m²     The BMI is above normal  Nutrition recommendations include reducing portion sizes, decreasing overall calorie intake, 3-5 servings of fruits/vegetables daily, decreasing soda and/or juice intake and moderation in carbohydrate intake  Exercise recommendations include exercising 3-5 times per week  Reason for visit is epigastric pain    Encounter provider Martir Fall DO    Provider located at [address] [place] [address]      Recent Visits  No visits were found meeting these conditions  Showing recent visits within past 7 days and meeting all other requirements     Today's Visits  Date Type Provider Dept   03/26/20 Telemedicine Martir Fall, [facility] today's visits and meeting all other requirements     Future Appointments  Date Type Provider Dept   03/26/20 Telemedicine [facility], [address] [facility]   Showing future appointments within next 150 days and meeting all other requirements        After connecting through [employer], the patient was identified by name and date of birth  Vernon Colindres was informed that this is a telemedicine visit and that the visit is being conducted through [facility] [place] and patient was informed that this is not a secure, HIPAA-complaint platform  He agrees to proceed  My office door was closed  No one else was in the room  He acknowledged consent and understanding of privacy and security of the video platform   The patient has agreed to participate and understands they can discontinue the visit at any time  Subjective  Jessika Longoria is a 62 y o  male who has been having several days of epigastric pain; burning, gnawing pain that is intermittent  No change with liquid or solid food intake  Denies any feeling of acid reflux  Denies regurgitation, burping, belching, halitosis  No history of peptic ulcer disease  No daily or recent increased use of NSAIDs  He took some ibuprofen last week  No change in bowel habits or abdominal distention  Past Medical History:   Diagnosis Date    Hypertension     resolved    Mitral valve prolapse     Neutropenia (HCC)     Thrombocytosis ([address] )     last assessed: 6/14/2016       Past Surgical History:   Procedure Laterality Date    HAND SURGERY      ORTHOPEDIC SURGERY Left     hand fracture repaired    [place] CYSTO/URETERO W/LITHOTRIPSY &INDWELL STENT INSRT Right 6/21/2016    Procedure: CYSTOSCOPY; URETEROSCOPY;  RETROGRADE PYELOGRAM; STENT ;  Surgeon: Bryant Mo MD;  [facility];  Service: Urology    SHOULDER ARTHROSCOPY W/ LABRAL REPAIR      SHOULDER SURGERY         Current Outpatient Medications   Medication Sig Dispense Refill    cyclobenzaprine (FLEXERIL) 5 mg tablet Take 1 tablet (5 mg total) by mouth 3 (three) times a day as needed for muscle spasms (Patient not taking: Reported on 1/16/2020) 30 tablet 0    fluticasone (FLONASE) 50 mcg/act nasal spray 2 sprays into each nostril daily (Patient taking differently: 2 sprays into each nostril as needed ) 16 g 1    Multiple Vitamin (MULTIVITAMIN) capsule Take 1 capsule by mouth daily      naproxen sodium (ALEVE) 220 MG tablet Take 220 mg by mouth as needed       pantoprazole (PROTONIX) 40 mg tablet Take 1 tablet (40 mg total) by mouth daily before breakfast 30 tablet 3     No current facility-administered medications for this visit           No Known Allergies    Review of Systems   Constitutional: Negative for chills, fatigue and fever  HENT: Negative for sore throat  Respiratory: Negative for cough, chest tightness, shortness of breath and wheezing  Cardiovascular: Negative  Gastrointestinal: Positive for abdominal pain (epigastric)  Negative for abdominal distention, blood in stool, constipation, diarrhea, nausea and vomiting  Neurological: Negative for headaches  Objective     Patient pointing to and palpating epigastric area as the region where he is having pain; he is able to deeply palpate the epigastric region without rebound or guarding; he appears to be alert and well without acute illness or acute abdomen; no respiratory distress or increased work of breathing; mood and affect are normal     I spent 10 minutes with the patient during this visit

## 2020-09-08 ENCOUNTER — OFFICE VISIT (OUTPATIENT)
Dept: INTERNAL MEDICINE CLINIC | Facility: CLINIC | Age: 55
End: 2020-09-08
Payer: COMMERCIAL

## 2020-09-08 VITALS
TEMPERATURE: 96.2 F | HEIGHT: 72 IN | BODY MASS INDEX: 27.39 KG/M2 | WEIGHT: 202.2 LBS | DIASTOLIC BLOOD PRESSURE: 76 MMHG | OXYGEN SATURATION: 98 % | SYSTOLIC BLOOD PRESSURE: 120 MMHG | HEART RATE: 74 BPM

## 2020-09-08 DIAGNOSIS — R10.31 RIGHT GROIN PAIN: Primary | ICD-10-CM

## 2020-09-08 PROCEDURE — 3078F DIAST BP <80 MM HG: CPT | Performed by: INTERNAL MEDICINE

## 2020-09-08 PROCEDURE — 1036F TOBACCO NON-USER: CPT | Performed by: INTERNAL MEDICINE

## 2020-09-08 PROCEDURE — 99213 OFFICE O/P EST LOW 20 MIN: CPT | Performed by: INTERNAL MEDICINE

## 2020-09-08 NOTE — PATIENT INSTRUCTIONS
Inguinal Hernia   AMBULATORY CARE:   An inguinal hernia  happens when organs or abdominal tissue push through a weak spot in the abdominal wall  The abdominal wall is made of fat and muscle  It holds the intestines in place  The hernia may contain fluid, tissue from the abdomen, or part of an organ (such as an intestine)  Common signs and symptoms:  A hernia may happen over time or it may happen suddenly  Some movements can make symptoms worse  Examples include when you cough, sneeze, strain to have a bowel movement, lift, or stand for a long time  You may have any of the following:  · A soft lump or bulge in your groin, lower abdomen, or scrotum     · Pain or burning in your abdomen  Seek care immediately if:   · You have severe abdominal pain with nausea and vomiting  · Your abdomen is larger than usual      · Your hernia gets bigger or is purple or blue  · You see blood in your bowel movements  · You feel weak, dizzy, or faint  Contact your healthcare provider if:   · You have a fever  · You have questions or concerns about your condition or care  Treatment for an inguinal hernia  usually involves surgery  Surgery can be done to place the hernia back inside the abdominal wall  Before you have surgery, you may be given medicine or have a manual reduction  Manual reduction means your healthcare provider will use his hands to put firm, steady pressure on your hernia  He will continue until the hernia disappears inside the abdominal wall  You may  need the following:  · NSAIDs , such as ibuprofen, help decrease swelling, pain, and fever  NSAIDs can cause stomach bleeding or kidney problems in certain people  If you take blood thinner medicine, always ask your healthcare provider if NSAIDs are safe for you  Always read the medicine label and follow directions  · Take your medicine as directed    Contact your healthcare provider if you think your medicine is not helping or if you have side effects  Tell him or her if you are allergic to any medicine  Keep a list of the medicines, vitamins, and herbs you take  Include the amounts, and when and why you take them  Bring the list or the pill bottles to follow-up visits  Carry your medicine list with you in case of an emergency  Follow up with your healthcare provider as directed:  Write down your questions so you remember to ask them during your visits  Manage your symptoms and prevent another hernia:   · Do not lift anything heavy  Heavy lifting can make your hernia worse or cause another hernia  Ask your healthcare provider how much is safe for you to lift  · Drink liquids as directed  Liquids may prevent constipation and straining during a bowel movement  Ask how much liquid to drink each day and which liquids are best for you  · Eat foods high in fiber  Fiber may prevent constipation and straining during a bowel movement  Foods that contain fiber include fruits, vegetables, beans, lentils, and whole grains  · Maintain a healthy weight  If you are overweight, weight loss may prevent your hernia from getting worse  It may also prevent another hernia  Talk to your healthcare provider about exercise and how to lose weight safely if you are overweight  · Do not smoke  Nicotine and other chemicals in cigarettes and cigars can weaken the abdominal wall  This may increase your risk for another hernia  Ask your healthcare provider for information if you currently smoke and need help to quit  E-cigarettes or smokeless tobacco still contain nicotine  Talk to your healthcare provider before you use these products  © 2017 [employer] Information is for End User's use only and may not be sold, redistributed or otherwise used for commercial purposes  All illustrations and images included in [employer]® are the copyrighted property of [employer] , [employer]  or [employer]  The above information is an  only  It is not intended as medical advice for individual conditions or treatments  Talk to your doctor, nurse or pharmacist before following any medical regimen to see if it is safe and effective for you

## 2020-09-08 NOTE — PROGRESS NOTES
[facility]    NAME: Say Norris  AGE: 60 y o  SEX: male  : 1965     DATE: 2020     Assessment and Plan:     1  Right groin pain    May have small hernia right inguinal region  Check ultrasound  Reassured at this time  - US groin/inguinal area; Future          Return if symptoms worsen or fail to improve  Chief Complaint:     Chief Complaint   Patient presents with    Hernia        History of Present Illness:     Right groin pain for the past 3 weeks  No inciting event  Has to find a comfortable position  No change in bowel function  Sometimes radiation down into the scrotum  Review of Systems:     Review of Systems   Constitutional: Negative for chills, diaphoresis and fever  Gastrointestinal: Negative for constipation, diarrhea, nausea and vomiting  Musculoskeletal:        Right groin pain      Objective:     /76 (BP Location: Left arm, Patient Position: Sitting, Cuff Size: Standard)   Pulse 74   Temp (!) 96 2 °F (35 7 °C) (Temporal) Comment: NO NSADS  Ht 5' 11 75" (1 822 m)   Wt 91 7 kg (202 lb 3 2 oz)   SpO2 98%   BMI 27 61 kg/m²     Physical Exam  Vitals signs reviewed  Constitutional:       General: He is not in acute distress  Cardiovascular:      Rate and Rhythm: Normal rate  Heart sounds: No murmur  Pulmonary:      Effort: Pulmonary effort is normal  No respiratory distress  Breath sounds: No wheezing or rales  Abdominal:      General: Abdomen is flat  Bowel sounds are normal  There is no distension  Tenderness: There is no abdominal tenderness  Comments: Prominence of right inguinal area; possible small hernia   Neurological:      Mental Status: He is alert           Jerry Jacobsen DO  [facility]

## 2020-09-14 ENCOUNTER — HOSPITAL ENCOUNTER (OUTPATIENT)
Dept: ULTRASOUND IMAGING | Facility: CLINIC | Age: 55
Discharge: HOME | End: 2020-09-14
Payer: COMMERCIAL

## 2020-09-14 DIAGNOSIS — R10.31 RIGHT GROIN PAIN: ICD-10-CM

## 2020-09-14 PROCEDURE — 76705 ECHO EXAM OF ABDOMEN: CPT

## 2020-11-09 ENCOUNTER — OFFICE VISIT (OUTPATIENT)
Dept: INTERNAL MEDICINE CLINIC | Facility: CLINIC | Age: 55
End: 2020-11-09
Payer: COMMERCIAL

## 2020-11-09 VITALS
HEIGHT: 76 IN | DIASTOLIC BLOOD PRESSURE: 84 MMHG | WEIGHT: 204.8 LBS | SYSTOLIC BLOOD PRESSURE: 130 MMHG | BODY MASS INDEX: 24.94 KG/M2 | HEART RATE: 66 BPM | TEMPERATURE: 97.8 F | OXYGEN SATURATION: 98 %

## 2020-11-09 DIAGNOSIS — R10.9 RIGHT SIDED ABDOMINAL PAIN: Primary | ICD-10-CM

## 2020-11-09 PROCEDURE — 99214 OFFICE O/P EST MOD 30 MIN: CPT | Performed by: INTERNAL MEDICINE

## 2020-11-09 PROCEDURE — 3008F BODY MASS INDEX DOCD: CPT | Performed by: INTERNAL MEDICINE

## 2020-11-09 PROCEDURE — 1036F TOBACCO NON-USER: CPT | Performed by: INTERNAL MEDICINE

## 2020-11-09 PROCEDURE — 3725F SCREEN DEPRESSION PERFORMED: CPT | Performed by: INTERNAL MEDICINE

## 2020-11-10 ENCOUNTER — LAB (OUTPATIENT)
Dept: LAB | Facility: HOSPITAL | Age: 55
End: 2020-11-10
Attending: INTERNAL MEDICINE
Payer: COMMERCIAL

## 2020-11-10 ENCOUNTER — TRANSCRIBE ORDERS (OUTPATIENT)
Dept: ADMINISTRATIVE | Facility: HOSPITAL | Age: 55
End: 2020-11-10

## 2020-11-10 DIAGNOSIS — M10.9 GOUT, UNSPECIFIED CAUSE, UNSPECIFIED CHRONICITY, UNSPECIFIED SITE: Primary | ICD-10-CM

## 2020-11-10 DIAGNOSIS — M10.9 GOUT, UNSPECIFIED CAUSE, UNSPECIFIED CHRONICITY, UNSPECIFIED SITE: ICD-10-CM

## 2020-11-10 DIAGNOSIS — R10.9 RIGHT SIDED ABDOMINAL PAIN: ICD-10-CM

## 2020-11-10 LAB
ALBUMIN SERPL BCP-MCNC: 3.8 G/DL (ref 3.5–5)
ALP SERPL-CCNC: 54 U/L (ref 46–116)
ALT SERPL W P-5'-P-CCNC: 29 U/L (ref 12–78)
ANION GAP SERPL CALCULATED.3IONS-SCNC: 8 MMOL/L (ref 4–13)
AST SERPL W P-5'-P-CCNC: 17 U/L (ref 5–45)
BILIRUB SERPL-MCNC: 0.6 MG/DL (ref 0.2–1)
BUN SERPL-MCNC: 13 MG/DL (ref 5–25)
CALCIUM SERPL-MCNC: 9.2 MG/DL (ref 8.3–10.1)
CHLORIDE SERPL-SCNC: 105 MMOL/L (ref 100–108)
CO2 SERPL-SCNC: 28 MMOL/L (ref 21–32)
CREAT SERPL-MCNC: 1.35 MG/DL (ref 0.6–1.3)
ERYTHROCYTE [DISTWIDTH] IN BLOOD BY AUTOMATED COUNT: 11 % (ref 11.6–15.1)
GFR SERPL CREATININE-BSD FRML MDRD: 67 ML/MIN/1.73SQ M
GLUCOSE P FAST SERPL-MCNC: 112 MG/DL (ref 65–99)
HCT VFR BLD AUTO: 47.5 % (ref 36.5–49.3)
HGB BLD-MCNC: 15.7 G/DL (ref 12–17)
MCH RBC QN AUTO: 35 PG (ref 26.8–34.3)
MCHC RBC AUTO-ENTMCNC: 33.1 G/DL (ref 31.4–37.4)
MCV RBC AUTO: 106 FL (ref 82–98)
PLATELET # BLD AUTO: 318 THOUSANDS/UL (ref 149–390)
PMV BLD AUTO: 9.6 FL (ref 8.9–12.7)
POTASSIUM SERPL-SCNC: 3.8 MMOL/L (ref 3.5–5.3)
PROT SERPL-MCNC: 7.1 G/DL (ref 6.4–8.2)
RBC # BLD AUTO: 4.49 MILLION/UL (ref 3.88–5.62)
SODIUM SERPL-SCNC: 141 MMOL/L (ref 136–145)
URATE SERPL-MCNC: 9.7 MG/DL (ref 4.2–8)
WBC # BLD AUTO: 4.71 THOUSAND/UL (ref 4.31–10.16)

## 2020-11-10 PROCEDURE — 85027 COMPLETE CBC AUTOMATED: CPT

## 2020-11-10 PROCEDURE — 84550 ASSAY OF BLOOD/URIC ACID: CPT

## 2020-11-10 PROCEDURE — 80053 COMPREHEN METABOLIC PANEL: CPT

## 2020-11-10 PROCEDURE — 36415 COLL VENOUS BLD VENIPUNCTURE: CPT

## 2020-11-13 ENCOUNTER — HOSPITAL ENCOUNTER (OUTPATIENT)
Dept: RADIOLOGY | Facility: MEDICAL CENTER | Age: 55
Discharge: HOME | End: 2020-11-13
Payer: COMMERCIAL

## 2020-11-13 DIAGNOSIS — R10.9 RIGHT SIDED ABDOMINAL PAIN: ICD-10-CM

## 2020-11-13 PROCEDURE — G1004 CDSM NDSC: HCPCS

## 2020-11-13 PROCEDURE — 74177 CT ABD & PELVIS W/CONTRAST: CPT

## 2020-11-13 RX ADMIN — IOHEXOL 100 ML: 350 INJECTION, SOLUTION INTRAVENOUS at 10:59

## 2020-11-17 ENCOUNTER — TELEPHONE (OUTPATIENT)
Dept: INTERNAL MEDICINE CLINIC | Facility: CLINIC | Age: 55
End: 2020-11-17

## 2020-11-17 DIAGNOSIS — R10.9 RIGHT SIDED ABDOMINAL PAIN: Primary | ICD-10-CM

## 2021-01-06 DIAGNOSIS — R10.13 EPIGASTRIC PAIN: ICD-10-CM

## 2021-01-06 RX ORDER — PANTOPRAZOLE SODIUM 40 MG/1
TABLET, DELAYED RELEASE ORAL
Qty: 30 TABLET | Refills: 3 | Status: SHIPPED | OUTPATIENT
Start: 2021-01-06 | End: 2021-02-17 | Stop reason: SDUPTHER

## 2021-01-22 RX ORDER — COLCHICINE 0.6 MG/1
0.6 TABLET ORAL 2 TIMES DAILY
COMMUNITY
Start: 2020-12-21 | End: 2021-06-08

## 2021-01-22 RX ORDER — INDOMETHACIN 50 MG/1
CAPSULE ORAL
COMMUNITY
Start: 2020-12-21 | End: 2021-06-08

## 2021-01-22 RX ORDER — ALLOPURINOL 300 MG/1
300 TABLET ORAL DAILY
COMMUNITY
Start: 2020-11-11

## 2021-01-27 ENCOUNTER — OFFICE VISIT (OUTPATIENT)
Dept: GASTROENTEROLOGY | Facility: CLINIC | Age: 56
End: 2021-01-27
Payer: COMMERCIAL

## 2021-01-27 VITALS
DIASTOLIC BLOOD PRESSURE: 90 MMHG | HEART RATE: 70 BPM | SYSTOLIC BLOOD PRESSURE: 138 MMHG | HEIGHT: 74 IN | WEIGHT: 208.8 LBS | BODY MASS INDEX: 26.8 KG/M2

## 2021-01-27 DIAGNOSIS — R10.84 GENERALIZED ABDOMINAL PAIN: Primary | ICD-10-CM

## 2021-01-27 DIAGNOSIS — M10.9 ACUTE GOUT OF HAND, UNSPECIFIED CAUSE, UNSPECIFIED LATERALITY: ICD-10-CM

## 2021-01-27 DIAGNOSIS — R10.31 RIGHT LOWER QUADRANT ABDOMINAL PAIN: ICD-10-CM

## 2021-01-27 PROCEDURE — 99204 OFFICE O/P NEW MOD 45 MIN: CPT | Performed by: INTERNAL MEDICINE

## 2021-01-27 PROCEDURE — 3008F BODY MASS INDEX DOCD: CPT | Performed by: INTERNAL MEDICINE

## 2021-01-27 PROCEDURE — 1036F TOBACCO NON-USER: CPT | Performed by: INTERNAL MEDICINE

## 2021-01-27 NOTE — PROGRESS NOTES
[facility] - Outpatient Consultation  Danni Gambino 60 y o  male MRN: [id number]  Encounter: [id number]          ASSESSMENT AND PLAN:      1  Generalized abdominal pain      - Urinalysis with microscopic  - Uric acid; Future  - Ambulatory referral to Urology; Future    2  Acute gout of underlying gout the right toe, unspecified cause, unspecified laterality  - Urinalysis with microscopic  - Uric acid; Future    3  Right lower quadrant abdominal pain    Schedule an ambulatory follow-up visit with Urology regarding his right lower quadrant abdominal pain and the previous finding on January 11, 2019 of a stable 4 mm right renal nonobstructing intrarenal calculus  We would be my recommendation that his twice daily indomethacin be discontinued since she is not currently experiencing symptoms related to his gout  Indomethacin conceivably could be causing small bowel erosions and ulcerations of therefore could be contributing to his ongoing abdominal pain symptoms  At the urinalysis is normal a urological evaluation is performed without evidence of urological based condition, then a colonoscopy can be performed     ______________________________________________________________________    HPI:  This 78-year-old male comes to the office today complaining of 2 types of abdominal pain  Over the past 2 months he has been experiencing off and on symptoms of either generalized sharper burning abdominal pain verses right lower quadrant burning abdominal pain  He denies any trauma to the abdomen  On the weekends, he states that he is a   He denies any change in his diet or change in bowel habits  He denies any diarrhea constipation  He denies nausea, vomiting, heartburn, dysphagia, bloating, gaseousness he denies any significant weight loss  On some days he feels as if he has done of 1000 sit-ups a another days he feels this burning sensation in the right lower quadrant  There is no pain or burning with urination  There is no increased or decreased urinary frequency  He has had problems with kidney stones in the past which began in 2017  A CT renal stone study that was performed on January 11, 2019 showed evidence of a stable 4 mm right renal  nonobstructing intrarenal calculus  A CT scan that was performed November 13, 2020 showed no acute intra-abdominal pathology  He was recently diagnosed with gout  His initial therapy consisted of colchicine and allopurinol  Indomethacin was added, 50 mg 3 times a day, however after experiencing diarrhea the indomethacin was reduced to 50 mg p o  B i d  He started the indomethacin 1 month ago and states that the abdominal pains have been going on at least 1 month prior to the beginning of indomethacin  REVIEW OF SYSTEMS:    CONSTITUTIONAL: Denies any fever, chills, rigors, and weight loss  HEENT: No earache or tinnitus  Denies hearing loss or visual disturbances  CARDIOVASCULAR: No chest pain or palpitations  RESPIRATORY: Denies any cough, hemoptysis, shortness of breath or dyspnea on exertion  GASTROINTESTINAL: As noted in the History of Present Illness  GENITOURINARY: No problems with urination  Denies any hematuria or dysuria  NEUROLOGIC: No dizziness or vertigo, denies headaches  MUSCULOSKELETAL: Denies any muscle or joint pain  SKIN: Denies skin rashes or itching  ENDOCRINE: Denies excessive thirst  Denies intolerance to heat or cold  PSYCHOSOCIAL: Denies depression or anxiety  Denies any recent memory loss         Historical Information   Past Medical History:   Diagnosis Date    Gout     Hypertension     resolved    Mitral valve prolapse     Neutropenia (HCC)     Thrombocytosis ([address] )     last assessed: 6/14/2016     Past Surgical History:   Procedure Laterality Date    HAND SURGERY      ORTHOPEDIC SURGERY Left     hand fracture repaired    AL CYSTO/URETERO W/LITHOTRIPSY &INDWELL STENT INSRT Right 2016    Procedure: CYSTOSCOPY; URETEROSCOPY;  RETROGRADE PYELOGRAM; STENT ;  Surgeon: Priscilla Williamson MD;  [facility];  Service: Urology    SHOULDER ARTHROSCOPY W/ LABRAL REPAIR      SHOULDER SURGERY       Social History   Social History     Substance and Sexual Activity   Alcohol Use Yes    Alcohol/week: 6 0 standard drinks    Types: 6 Cans of beer per week    Frequency: 2-3 times a week    Drinks per session: 1 or 2    Binge frequency: Never    Comment: week; social (as per allscripts)     Social History     Substance and Sexual Activity   Drug Use No     Social History     Tobacco Use   Smoking Status Former Smoker    Packs/day: 0 03    Years: 3 00    Pack years: 0 09    Types: Cigarettes    Quit date: 1999    Years since quittin 7   Smokeless Tobacco Never Used     Family History   Problem Relation Age of Onset    Stomach cancer Father     Hypertension Father         benign essential    Diverticulosis Father     Breast cancer Sister     Diabetes Maternal Grandmother         mellitus    Ovarian cancer Paternal Grandmother     Ovarian cancer Child     Breast cancer Maternal Aunt     Lung cancer Paternal Uncle     Pancreatic cancer Paternal Uncle     Lung cancer Cousin        Meds/Allergies       Current Outpatient Medications:     allopurinol (ZYLOPRIM) 300 mg tablet    colchicine (COLCRYS) 0 6 mg tablet    indomethacin (INDOCIN) 50 mg capsule    Multiple Vitamin (MULTIVITAMIN) capsule    pantoprazole (PROTONIX) 40 mg tablet    cyclobenzaprine (FLEXERIL) 5 mg tablet    fluticasone (FLONASE) 50 mcg/act nasal spray    naproxen sodium (ALEVE) 220 MG tablet    No Known Allergies        Objective     Blood pressure 138/90, pulse 70, height 6' 2" (1 88 m), weight 94 7 kg (208 lb 12 8 oz)  Body mass index is 26 81 kg/m²          PHYSICAL EXAM:      General Appearance:   Alert, cooperative, no distress   HEENT:   Normocephalic, atraumatic, anicteric      Neck:  Supple, symmetrical, trachea midline   Lungs:   Clear to auscultation bilaterally; no rales, rhonchi or wheezing; respirations unlabored    Heart[de-identified]   Regular rate and rhythm; no murmur, rub, or gallop  Abdomen:   Soft, non-tender, non-distended; normal bowel sounds; no masses, no organomegaly    Genitalia:   Deferred    Rectal:   Deferred    Extremities:  No cyanosis, clubbing or edema    Pulses:  2+ and symmetric    Skin:  No jaundice, rashes, or lesions    Lymph nodes:  No palpable cervical lymphadenopathy        Lab Results:   No visits with results within 1 Day(s) from this visit  Latest known visit with results is:   Lab on 11/10/2020   Component Date Value    WBC 11/10/2020 4 71     RBC 11/10/2020 4 49     Hemoglobin 11/10/2020 15 7     Hematocrit 11/10/2020 47 5     MCV 11/10/2020 106*    MCH 11/10/2020 35 0*    MCHC 11/10/2020 33 1     RDW 11/10/2020 11 0*    Platelets 64/64/6561 318     MPV 11/10/2020 9 6     Sodium 11/10/2020 141     Potassium 11/10/2020 3 8     Chloride 11/10/2020 105     CO2 11/10/2020 28     ANION GAP 11/10/2020 8     BUN 11/10/2020 13     Creatinine 11/10/2020 1 35*    Glucose, Fasting 11/10/2020 112*    Calcium 11/10/2020 9 2     AST 11/10/2020 17     ALT 11/10/2020 29     Alkaline Phosphatase 11/10/2020 54     Total Protein 11/10/2020 7 1     Albumin 11/10/2020 3 8     Total Bilirubin 11/10/2020 0 60     eGFR 11/10/2020 67     Uric Acid 11/10/2020 9 7*         Radiology Results:   No results found

## 2021-02-17 ENCOUNTER — OFFICE VISIT (OUTPATIENT)
Dept: INTERNAL MEDICINE CLINIC | Facility: CLINIC | Age: 56
End: 2021-02-17
Payer: COMMERCIAL

## 2021-02-17 VITALS
TEMPERATURE: 98.3 F | HEART RATE: 86 BPM | DIASTOLIC BLOOD PRESSURE: 90 MMHG | SYSTOLIC BLOOD PRESSURE: 132 MMHG | WEIGHT: 214 LBS | HEIGHT: 73 IN | OXYGEN SATURATION: 97 % | BODY MASS INDEX: 28.36 KG/M2

## 2021-02-17 DIAGNOSIS — R10.13 EPIGASTRIC PAIN: ICD-10-CM

## 2021-02-17 DIAGNOSIS — M79.18 RIGHT BUTTOCK PAIN: ICD-10-CM

## 2021-02-17 DIAGNOSIS — R10.31 RIGHT LOWER QUADRANT PAIN: Primary | ICD-10-CM

## 2021-02-17 PROCEDURE — 99214 OFFICE O/P EST MOD 30 MIN: CPT | Performed by: INTERNAL MEDICINE

## 2021-02-17 PROCEDURE — 3725F SCREEN DEPRESSION PERFORMED: CPT | Performed by: INTERNAL MEDICINE

## 2021-02-17 RX ORDER — TRAMADOL HYDROCHLORIDE 50 MG/1
50 TABLET ORAL EVERY 8 HOURS PRN
Qty: 30 TABLET | Refills: 0 | Status: SHIPPED | OUTPATIENT
Start: 2021-02-17 | End: 2021-03-09 | Stop reason: SDUPTHER

## 2021-02-17 RX ORDER — PANTOPRAZOLE SODIUM 40 MG/1
40 TABLET, DELAYED RELEASE ORAL DAILY
Qty: 90 TABLET | Refills: 1 | Status: SHIPPED | OUTPATIENT
Start: 2021-02-17 | End: 2021-10-19 | Stop reason: SDUPTHER

## 2021-02-18 ENCOUNTER — HOSPITAL ENCOUNTER (OUTPATIENT)
Dept: RADIOLOGY | Facility: HOSPITAL | Age: 56
Discharge: HOME | End: 2021-02-18
Attending: INTERNAL MEDICINE
Payer: COMMERCIAL

## 2021-02-18 ENCOUNTER — APPOINTMENT (OUTPATIENT)
Dept: LAB | Facility: HOSPITAL | Age: 56
End: 2021-02-18
Attending: INTERNAL MEDICINE
Payer: COMMERCIAL

## 2021-02-18 DIAGNOSIS — M79.18 RIGHT BUTTOCK PAIN: ICD-10-CM

## 2021-02-18 DIAGNOSIS — R10.31 RIGHT LOWER QUADRANT PAIN: ICD-10-CM

## 2021-02-18 DIAGNOSIS — M10.9 ACUTE GOUT OF HAND, UNSPECIFIED CAUSE, UNSPECIFIED LATERALITY: ICD-10-CM

## 2021-02-18 DIAGNOSIS — R10.84 GENERALIZED ABDOMINAL PAIN: ICD-10-CM

## 2021-02-18 LAB
BACTERIA UR QL AUTO: ABNORMAL /HPF
BILIRUB UR QL STRIP: NEGATIVE
CLARITY UR: CLEAR
COLOR UR: YELLOW
GLUCOSE UR STRIP-MCNC: NEGATIVE MG/DL
HGB UR QL STRIP.AUTO: ABNORMAL
HYALINE CASTS #/AREA URNS LPF: ABNORMAL /LPF
KETONES UR STRIP-MCNC: NEGATIVE MG/DL
LEUKOCYTE ESTERASE UR QL STRIP: NEGATIVE
NITRITE UR QL STRIP: NEGATIVE
NON-SQ EPI CELLS URNS QL MICRO: ABNORMAL /HPF
PH UR STRIP.AUTO: 6 [PH]
PROT UR STRIP-MCNC: NEGATIVE MG/DL
RBC #/AREA URNS AUTO: ABNORMAL /HPF
SP GR UR STRIP.AUTO: 1.01 (ref 1–1.03)
URATE SERPL-MCNC: 5.8 MG/DL (ref 4.2–8)
UROBILINOGEN UR QL STRIP.AUTO: 0.2 E.U./DL
WBC #/AREA URNS AUTO: ABNORMAL /HPF

## 2021-02-18 PROCEDURE — 84550 ASSAY OF BLOOD/URIC ACID: CPT

## 2021-02-18 PROCEDURE — 36415 COLL VENOUS BLD VENIPUNCTURE: CPT

## 2021-02-18 PROCEDURE — 81001 URINALYSIS AUTO W/SCOPE: CPT

## 2021-02-18 PROCEDURE — 73502 X-RAY EXAM HIP UNI 2-3 VIEWS: CPT

## 2021-02-18 NOTE — PROGRESS NOTES
[facility]    NAME: Sabrina Lemons  AGE: 60 y o  SEX: male  : 1965     DATE: 2021     Assessment and Plan:     1  Right lower quadrant pain  2  Right buttock pain    Overall etiology of his symptoms is unclear to me  The right renal calculi should have nothing to do with the pain he is experiencing  He denies urinary symptoms, but will have him get a UA for completeness  During today's appointment, he focused less on the right lower quadrant/groin pain and focused more on right buttock/gluteal pain  He had point tenderness  His gait is normal and he is able to move his hip freely  No pain in his lower back or SI joint and no radiation of pain down his whole leg to suggest sciatica/nerve impingement/herniated disc  Will check x-ray of the right hip  Follow-up with urology as scheduled  He may still require a colonoscopy from GI once he sees urology  I advised he take tramadol for severe pain  There are risks associated with opioid medications, including dependence, addiction and tolerance  The patient understands and agrees to use these medications only as prescribed  Potential side effects of the medications include, but are not limited to, constipation, drowsiness, addiction, impaired judgment and risk of fatal overdose if not taken as prescribed  The patient was warned against driving while taking sedation medications  Sharing medications is a felony  At this point in time, the patient is showing no signs of addiction, abuse, diversion or suicidal ideation  [place] PDMP or [place]  reviewed: No red flags were identified; safe to proceed with prescription  PDMP Review       Value Time User    PDMP Reviewed  Yes 2021  2:15 PM Haider Nowak DO         - traMADol (ULTRAM) 50 mg tablet; Take 1 tablet (50 mg total) by mouth every 8 (eight) hours as needed for moderate pain  Dispense: 30 tablet;  Refill: 0  - UA w Reflex to Microscopic w Reflex to Culture -Lab Collect; Future  - XR hip/pelv 2-3 vws right if performed; Future    3  Heartburn  - pantoprazole (PROTONIX) 40 mg tablet; Take 1 tablet (40 mg total) by mouth daily  Dispense: 90 tablet; Refill: 1    BMI Counseling: Body mass index is 28 23 kg/m²  The BMI is above normal  Nutrition recommendations include encouraging healthy choices of fruits and vegetables, moderation in carbohydrate intake and increasing intake of lean protein  Chief Complaint:     Chief Complaint   Patient presents with    Groin Pain     (R) side for about 6 weeks        History of Present Illness:     Since at least November, he has complained of right lower quadrant pain and groin pain  He has had multiple ultrasounds and a CT scan which did not reveal any etiology to his symptoms  Pain just started one day  Cannot think of any inciting event  Nothing makes it better  Recently treated for gout with indomethacin and even taking indomethacin, he had not noticed significant improvement in his pain  No change with bowel movements  No change with food intake  He saw GI and they wanted him to see urology due to right renal calculi measuring up to 10 mm on US kidney/bladder  The one change patient seems to be having now is he is very focused on right gluteal region as a source of discomfort/pain  He has no problems with gait  Normal range of motion of his hip  No lateral hip pain  He finds some relief by certain positions he gets himself in  The pain has been worsening since November and he is frustrated because what he is experiencing is hard to describe  Review of Systems:     Review of Systems   Constitutional: Negative for activity change, appetite change and fatigue  Respiratory: Negative for apnea, cough, chest tightness, shortness of breath and wheezing  Cardiovascular: Negative for chest pain, palpitations and leg swelling     Gastrointestinal: Positive for abdominal pain (right lower quadrant)  Negative for abdominal distention, blood in stool, constipation, diarrhea, nausea and vomiting  Musculoskeletal:        Right buttock and right groin pain   Neurological: Negative for dizziness, weakness, light-headedness, numbness and headaches  Psychiatric/Behavioral: Negative for behavioral problems, confusion, hallucinations, sleep disturbance and suicidal ideas  The patient is not nervous/anxious  Objective:     /90 (BP Location: Left arm, Patient Position: Sitting, Cuff Size: Standard)   Pulse 86   Temp 98 3 °F (36 8 °C) (Temporal) Comment: NO NSAIDS  Ht 6' 1" (1 854 m)   Wt 97 1 kg (214 lb)   SpO2 97%   BMI 28 23 kg/m²     Physical Exam  Vitals signs reviewed  Constitutional:       General: He is not in acute distress  Appearance: He is well-developed  He is not diaphoretic  Cardiovascular:      Rate and Rhythm: Normal rate and regular rhythm  Heart sounds: Normal heart sounds  No murmur  Pulmonary:      Effort: Pulmonary effort is normal  No respiratory distress  Breath sounds: Normal breath sounds  No wheezing or rales  Abdominal:      General: Bowel sounds are normal  There is no distension  Palpations: Abdomen is soft  There is no mass  Tenderness: There is no abdominal tenderness  There is no right CVA tenderness, left CVA tenderness, guarding or rebound  Hernia: No hernia is present  Musculoskeletal:         General: Tenderness (over lateral/superior right gluteal region) present  Right lower leg: No edema  Left lower leg: No edema  Comments: Normal range of motion of the hip  Negative Fabere's test  No lateral tenderness of the hip   Neurological:      Mental Status: He is alert     Psychiatric:         Mood and Affect: Mood normal          Behavior: Behavior normal        Roscoe Boston DO  MEDICAL [id number]

## 2021-02-19 ENCOUNTER — TELEPHONE (OUTPATIENT)
Dept: GASTROENTEROLOGY | Facility: CLINIC | Age: 56
End: 2021-02-19

## 2021-02-19 NOTE — TELEPHONE ENCOUNTER
----- Message from Juan David Barrios DO sent at 2/18/2021  1:46 PM EST -----  Patient uric acid level is now down to 5 8  This is much improved    Patient does not need to be taking colchicine but can still remain on his Zyloprim

## 2021-02-19 NOTE — TELEPHONE ENCOUNTER
----- Message from Tony Cox DO sent at 2/18/2021  2:44 PM EST -----  Please call these results to the patient  Urinalysis shows no bacteria and only occasional epithelial cells  There are very few white blood cell    These findings are not consistent with urinary tract infection

## 2021-02-19 NOTE — TELEPHONE ENCOUNTER
----- Message from Kaci Arias DO sent at 2/18/2021  1:44 PM EST -----  Please call the results to the patient  The urinalysis does show trace amount of blood    The rest of the urinalysis was normal

## 2021-02-19 NOTE — TELEPHONE ENCOUNTER
Patient asked if we can help get him a sooner appt with Urology  Spoke to Quita Beltrán and p's  appt was moved up to 2/23/21 with Dr Lipscomb Medicus  Called pt and advised

## 2021-02-23 ENCOUNTER — TELEPHONE (OUTPATIENT)
Dept: UROLOGY | Facility: CLINIC | Age: 56
End: 2021-02-23

## 2021-02-23 ENCOUNTER — OFFICE VISIT (OUTPATIENT)
Dept: UROLOGY | Facility: CLINIC | Age: 56
End: 2021-02-23
Payer: COMMERCIAL

## 2021-02-23 VITALS
HEIGHT: 73 IN | BODY MASS INDEX: 27.3 KG/M2 | SYSTOLIC BLOOD PRESSURE: 152 MMHG | WEIGHT: 206 LBS | HEART RATE: 120 BPM | DIASTOLIC BLOOD PRESSURE: 70 MMHG

## 2021-02-23 DIAGNOSIS — Z12.5 SCREENING FOR PROSTATE CANCER: Primary | ICD-10-CM

## 2021-02-23 DIAGNOSIS — R10.9 FLANK PAIN: ICD-10-CM

## 2021-02-23 DIAGNOSIS — R10.84 GENERALIZED ABDOMINAL PAIN: ICD-10-CM

## 2021-02-23 PROCEDURE — 99214 OFFICE O/P EST MOD 30 MIN: CPT | Performed by: UROLOGY

## 2021-02-23 NOTE — PROGRESS NOTES
UROLOGY FOLLOWUP NOTE     CHIEF COMPLAINT   Otoniel Ambrose is a 60 y o  male with a complaint of   Chief Complaint   Patient presents with    Nephrolithiasis       History of Present Illness:     60 y o  male, follows with Dr Tae Perez  Last seen in January of 2020  Patient has a history of kidney stone disease, last year had a 5 mm asymptomatic nonobstructing intrarenal stone  Has been undergoing regular prostate cancer screening with rectal exam and PSA  This number has been under 1  Patient returns with complaints of flank pain  Underwent CT scan with IV contrast in November which was negative for stone or hydronephrosis  Patient reports gluteal pain on the right side radiating into his groin and down into his right leg  He denies specific neuropathy  He has not been helped by NSAIDs and has been taking some tramadol with relief but does not want to continue to take narcotics  He is active 3 times a week with exercise this tends to improve the discomfort    He denies prior musculoskeletal back issues    Lab Results   Component Value Date    PSA 0 5 02/21/2020    PSA 0 4 09/20/2018     Past Medical History:     Past Medical History:   Diagnosis Date    Gout     Hypertension     resolved    Mitral valve prolapse     Neutropenia (HCC)     Thrombocytosis ([address] )     last assessed: 6/14/2016       PAST SURGICAL HISTORY:     Past Surgical History:   Procedure Laterality Date    HAND SURGERY      ORTHOPEDIC SURGERY Left     hand fracture repaired    CA CYSTO/URETERO W/LITHOTRIPSY &INDWELL STENT INSRT Right 6/21/2016    Procedure: CYSTOSCOPY; URETEROSCOPY;  RETROGRADE PYELOGRAM; STENT ;  Surgeon: Tatum Gary MD;  [facility];  Service: Urology    SHOULDER ARTHROSCOPY W/ LABRAL REPAIR      SHOULDER SURGERY         CURRENT MEDICATIONS:     Current Outpatient Medications   Medication Sig Dispense Refill    allopurinol (ZYLOPRIM) 300 mg tablet Take 300 mg by mouth daily      colchicine (COLCRYS) 0 6 mg tablet Take 0 6 mg by mouth 2 (two) times a day      indomethacin (INDOCIN) 50 mg capsule take 1 capsule by mouth three times a day after meals      Multiple Vitamin (MULTIVITAMIN) capsule Take 1 capsule by mouth daily      naproxen sodium (ALEVE) 220 MG tablet Take 220 mg by mouth as needed       pantoprazole (PROTONIX) 40 mg tablet Take 1 tablet (40 mg total) by mouth daily 90 tablet 1    traMADol (ULTRAM) 50 mg tablet Take 1 tablet (50 mg total) by mouth every 8 (eight) hours as needed for moderate pain 30 tablet 0    cyclobenzaprine (FLEXERIL) 5 mg tablet Take 1 tablet (5 mg total) by mouth 3 (three) times a day as needed for muscle spasms (Patient not taking: Reported on 2020) 30 tablet 0    fluticasone (FLONASE) 50 mcg/act nasal spray 2 sprays into each nostril daily (Patient not taking: Reported on 2021) 16 g 1     No current facility-administered medications for this visit  ALLERGIES:   No Known Allergies    SOCIAL HISTORY:     Social History     Socioeconomic History    Marital status: /Civil Union     Spouse name: None    Number of children: None    Years of education: None    Highest education level: None   Occupational History    Occupation: full-time employment   Social Needs    Financial resource strain: None    Food insecurity     Worry: None     Inability: None    Transportation needs     Medical: None     Non-medical: None   Tobacco Use    Smoking status: Former Smoker     Packs/day: 0 03     Years: 3 00     Pack years: 0 09     Types: Cigarettes     Quit date: 1999     Years since quittin 8    Smokeless tobacco: Never Used   Substance and Sexual Activity    Alcohol use:  Yes     Alcohol/week: 6 0 standard drinks     Types: 6 Cans of beer per week     Frequency: 2-3 times a week     Drinks per session: 1 or 2     Binge frequency: Never     Comment: week; social (as per allscripts)    Drug use: No    Sexual activity: Yes Partners: Female   Lifestyle    Physical activity     Days per week: 2 days     Minutes per session: 90 min    Stress: To some extent   Relationships    Social connections     Talks on phone: None     Gets together: None     Attends Zoroastrian service: None     Active member of club or organization: None     Attends meetings of clubs or organizations: None     Relationship status: None    Intimate partner violence     Fear of current or ex partner: None     Emotionally abused: None     Physically abused: None     Forced sexual activity: None   Other Topics Concern    None   Social History Narrative    None       SOCIAL HISTORY:     Family History   Problem Relation Age of Onset    Stomach cancer Father     Hypertension Father         benign essential    Diverticulosis Father     Breast cancer Sister     Diabetes Maternal Grandmother         mellitus    Ovarian cancer Paternal Grandmother     Ovarian cancer Child     Breast cancer Maternal Aunt     Lung cancer Paternal Uncle     Pancreatic cancer Paternal Uncle     Lung cancer Cousin        REVIEW OF SYSTEMS:     Review of Systems   Constitutional: Negative  Respiratory: Negative  Cardiovascular: Negative  Gastrointestinal: Negative  Genitourinary: Negative for flank pain  Musculoskeletal: Positive for back pain and gait problem  Skin: Negative  Psychiatric/Behavioral: Negative  PHYSICAL EXAM:     /70   Pulse (!) 120   Ht 6' 1" (1 854 m)   Wt 93 4 kg (206 lb)   BMI 27 18 kg/m²     General:  Healthy appearing Black male in no acute distress  They have a normal affect  There is not appear to be any gross neurologic defects or abnormalities  HEENT:  Normocephalic, atraumatic  Neck is supple without any palpable lymphadenopathy  Cardiovascular:  Patient has normal palpable distal radial pulses  There is no significant peripheral edema  No JVD is noted  Respiratory:  Patient has unlabored respirations    There is no audible wheeze or rhonchi  Abdomen:  Abdomen is without surgical scars  Abdomen is soft and nontender  There is no tympany  Slight umbilical hernia  Genitourinary:  Circumcised phallus, orthotopic meatus, testicles smooth and descended, no testicular pain with examination, no inguinal hernia palpated,  Rectal exam demonstrates some external hemorrhoids and a small smooth prostate without nodules    Musculoskeletal:  Patient does not have significant CVA tenderness in the  flank with palpation or percussion  They full range of motion in all 4 extremities  Strength in all 4 extremities appears congruent  Patient is able to ambulate without assistance or difficulty  Dermatologic:  Patient has no skin abnormalities or rashes  LABS:     CBC:   Lab Results   Component Value Date    WBC 4 71 11/10/2020    HGB 15 7 11/10/2020    HCT 47 5 11/10/2020     (H) 11/10/2020     11/10/2020       BMP:   Lab Results   Component Value Date    GLUCOSE 97 2016    CALCIUM 9 2 11/10/2020     2016    K 3 8 11/10/2020    CO2 28 11/10/2020     11/10/2020    BUN 13 11/10/2020    CREATININE 1 35 (H) 11/10/2020       IMAGIN/13/20  CT ABDOMEN AND PELVIS WITH IV CONTRAST     INDICATION:   R10 9: Unspecified abdominal pain      COMPARISON:  CT abdomen/pelvis dated 2019      TECHNIQUE:  CT examination of the abdomen and pelvis was performed  Axial, sagittal, and coronal 2D reformatted images were created from the source data and submitted for interpretation      Radiation dose length product (DLP) for this visit:  464 mGy-cm     This examination, like all CT scans performed in the [facility], was performed utilizing techniques to minimize radiation dose exposure, including the use of iterative   reconstruction and automated exposure control      IV Contrast:  100 mL of iohexol (OMNIPAQUE)  Enteric Contrast:  Enteric contrast was administered      FINDINGS:     ABDOMEN     LOWER CHEST:  No clinically significant abnormality identified in the visualized lower chest      LIVER/BILIARY TREE:  Unremarkable      GALLBLADDER:  No calcified gallstones  No pericholecystic inflammatory change      SPLEEN:  Unremarkable      PANCREAS:  Unremarkable      ADRENAL GLANDS:  Unremarkable      KIDNEYS/URETERS:  Unremarkable  No hydronephrosis      STOMACH AND BOWEL:  Unremarkable      APPENDIX:  No findings to suggest appendicitis      ABDOMINOPELVIC CAVITY:  No ascites  No pneumoperitoneum  No lymphadenopathy      VESSELS:  Unremarkable for patient's age      PELVIS     REPRODUCTIVE ORGANS:  Unremarkable for patient's age      URINARY BLADDER:  Unremarkable      ABDOMINAL WALL/INGUINAL REGIONS:  Small fat-containing umbilical hernia      OSSEOUS STRUCTURES:  No acute fracture or destructive osseous lesion      IMPRESSION:     No acute intra-abdominal pathology  PROCEDURE:     Patient unable urinate    ASSESSMENT:     60 y o  male with history of a nonobstructing stone, new flank pain, need for prostate cancer screening    PLAN:      patient's CT was reviewed  Unfortunately the system is down I am unable to review the physical images today  I have offered the patient repeat imaging as it has been 3 months to better characterize whether not there is a stone present  I have discussed x-ray and ultrasound as a alternative to CT scan with lower radiation exposure however not as definitive  Patient prefers definitive CT  His rectal exam was updated today and PSA will be performed in 2 weeks  If the patient's CT does not demonstrate urologic concern, I suspect this is likely more musculoskeletal   Patient could then be referred to physical therapy    He will return in 1 year for updated prostate cancer screening unless there are findings on the PSA or CT

## 2021-02-23 NOTE — TELEPHONE ENCOUNTER
Return in about 1 year (around 2/23/2022) for CT and PSA 2 weeks, 1 year APC visit with PSA  Schedule with erica osborn

## 2021-03-01 ENCOUNTER — TELEPHONE (OUTPATIENT)
Dept: INTERNAL MEDICINE CLINIC | Facility: CLINIC | Age: 56
End: 2021-03-01

## 2021-03-01 NOTE — TELEPHONE ENCOUNTER
----- Message from Marlin Jenkins DO sent at 2/28/2021  3:34 PM EST -----  X-ray showed evidence of mild hip arthritis   He may benefit from physical therapy

## 2021-03-03 ENCOUNTER — HOSPITAL ENCOUNTER (OUTPATIENT)
Dept: CT IMAGING | Facility: HOSPITAL | Age: 56
Discharge: HOME | End: 2021-03-03
Attending: UROLOGY
Payer: COMMERCIAL

## 2021-03-03 ENCOUNTER — APPOINTMENT (OUTPATIENT)
Dept: LAB | Facility: HOSPITAL | Age: 56
End: 2021-03-03
Attending: UROLOGY
Payer: COMMERCIAL

## 2021-03-03 DIAGNOSIS — Z12.5 SCREENING FOR PROSTATE CANCER: ICD-10-CM

## 2021-03-03 DIAGNOSIS — R10.9 FLANK PAIN: ICD-10-CM

## 2021-03-03 LAB — PSA SERPL-MCNC: 0.4 NG/ML (ref 0–4)

## 2021-03-03 PROCEDURE — G0103 PSA SCREENING: HCPCS

## 2021-03-03 PROCEDURE — 74176 CT ABD & PELVIS W/O CONTRAST: CPT

## 2021-03-03 PROCEDURE — 36415 COLL VENOUS BLD VENIPUNCTURE: CPT

## 2021-03-03 PROCEDURE — G1004 CDSM NDSC: HCPCS

## 2021-03-08 ENCOUNTER — TELEPHONE (OUTPATIENT)
Dept: UROLOGY | Facility: MEDICAL CENTER | Age: 56
End: 2021-03-08

## 2021-03-08 ENCOUNTER — TELEPHONE (OUTPATIENT)
Dept: UROLOGY | Facility: CLINIC | Age: 56
End: 2021-03-08

## 2021-03-08 NOTE — TELEPHONE ENCOUNTER
Patient of Dr Kristine Gates seen in [facility] office  Patient is calling for results of cat scan (advised not ready yet) and labs

## 2021-03-08 NOTE — TELEPHONE ENCOUNTER
Called patient to inform  Aware referral to PT will be placed and their office will be reaching out to him to coordinate an appt

## 2021-03-09 DIAGNOSIS — R10.31 RIGHT LOWER QUADRANT PAIN: ICD-10-CM

## 2021-03-09 RX ORDER — TRAMADOL HYDROCHLORIDE 50 MG/1
50 TABLET ORAL EVERY 8 HOURS PRN
Qty: 30 TABLET | Refills: 0 | Status: SHIPPED | OUTPATIENT
Start: 2021-03-09 | End: 2021-04-02 | Stop reason: SDUPTHER

## 2021-03-09 NOTE — TELEPHONE ENCOUNTER
Controlled Sub[place]    [place]  reviewed: No red flags were identified; safe to proceed with prescription      PDMP Review       Value Time User    PDMP Reviewed  Yes 3/9/2021  1:52 PM Haider Nowak DO

## 2021-03-23 ENCOUNTER — PREP FOR PROCEDURE (OUTPATIENT)
Dept: GASTROENTEROLOGY | Facility: CLINIC | Age: 56
End: 2021-03-23

## 2021-03-23 ENCOUNTER — OFFICE VISIT (OUTPATIENT)
Dept: GASTROENTEROLOGY | Facility: CLINIC | Age: 56
End: 2021-03-23
Payer: COMMERCIAL

## 2021-03-23 VITALS
SYSTOLIC BLOOD PRESSURE: 140 MMHG | DIASTOLIC BLOOD PRESSURE: 92 MMHG | HEIGHT: 74 IN | BODY MASS INDEX: 26.9 KG/M2 | HEART RATE: 76 BPM | WEIGHT: 209.6 LBS

## 2021-03-23 DIAGNOSIS — R10.31 RIGHT LOWER QUADRANT ABDOMINAL PAIN: Primary | ICD-10-CM

## 2021-03-23 DIAGNOSIS — R10.84 GENERALIZED ABDOMINAL PAIN: ICD-10-CM

## 2021-03-23 PROCEDURE — 1036F TOBACCO NON-USER: CPT | Performed by: PHYSICIAN ASSISTANT

## 2021-03-23 PROCEDURE — 3008F BODY MASS INDEX DOCD: CPT | Performed by: PHYSICIAN ASSISTANT

## 2021-03-23 PROCEDURE — 99213 OFFICE O/P EST LOW 20 MIN: CPT | Performed by: PHYSICIAN ASSISTANT

## 2021-03-23 NOTE — PROGRESS NOTES
[facility] - Outpatient Follow-up Note  Janie Espinosa 60 y o  male MRN: [id number]  Encounter: [id number]          ASSESSMENT AND PLAN:      1  Lower abdominal pain  He continues to report bilateral pelvic and lower back pain with occasional radiation to the groin  CT and UA were unremarkable  Will update colonoscopy however I suspect these symptoms are musculoskeletal  Will order an MRI of the lumbar spine    ______________________________________________________________________    SUBJECTIVE:  68-year-old male presents for follow-up of lower abdominal pain  He continues to report bilateral lower abdominal and pelvic pain  This radiates around his hips and bilateral flanks to his lower back  He describes it as an annoying and uncomfortable aching sensation  Occasionally it feels like a burning  He also reports that occasionally the pain radiates into the groin bilaterally  He followed up with Urology who performed a rectal exam, ordered a UA and ordered a CT with renal protocol  All of this testing was normal   He denies that anything in particular exacerbates the pain  He does think occasionally that if he over exerts himself it might be worse but he is unsure of this as he has been cautious about not over exerting himself  He takes tramadol as needed for the pain which does help  He is off of NSAID medication  He denies any upper GI symptoms such as nausea, vomiting, heartburn dysphagia  He denies diarrhea or constipation  He denies that the pain is is exacerbated by eating or bowel movements  He does not feel gassy or bloated  He reports that his urination is normal   He denies any history of lower back pain  He is a  on weekends  His season started in the fall  They practice every Saturday and Sunday  He admits that over the past couple months he has noticed that it takes him longer to recover from the practices        REVIEW OF SYSTEMS IS OTHERWISE NEGATIVE        Historical Information   Past Medical History:   Diagnosis Date    Gout     Hypertension     resolved    Mitral valve prolapse     Neutropenia (HCC)     Thrombocytosis (HCC)     last assessed: 2016     Past Surgical History:   Procedure Laterality Date    HAND SURGERY      ORTHOPEDIC SURGERY Left     hand fracture repaired    MA CYSTO/URETERO W/LITHOTRIPSY &INDWELL STENT INSRT Right 2016    Procedure: CYSTOSCOPY; URETEROSCOPY;  RETROGRADE PYELOGRAM; STENT ;  Surgeon: Edward Dumont MD;  [facility];  Service: Urology    SHOULDER ARTHROSCOPY W/ LABRAL REPAIR      SHOULDER SURGERY       Social History   Social History     Substance and Sexual Activity   Alcohol Use Yes    Alcohol/week: 6 0 standard drinks    Types: 6 Cans of beer per week    Frequency: 2-3 times a week    Drinks per session: 1 or 2    Binge frequency: Never    Comment: week; social (as per allscripts)     Social History     Substance and Sexual Activity   Drug Use No     Social History     Tobacco Use   Smoking Status Former Smoker    Packs/day: 0 03    Years: 3 00    Pack years: 0 09    Types: Cigarettes    Quit date: 1999    Years since quittin 9   Smokeless Tobacco Never Used     Family History   Problem Relation Age of Onset    Stomach cancer Father     Hypertension Father         benign essential    Diverticulosis Father     Breast cancer Sister     Diabetes Maternal Grandmother         mellitus    Ovarian cancer Paternal Grandmother     Ovarian cancer Child     Breast cancer Maternal Aunt     Lung cancer Paternal Uncle     Pancreatic cancer Paternal Uncle     Lung cancer Cousin        Meds/Allergies       Current Outpatient Medications:     allopurinol (ZYLOPRIM) 300 mg tablet    traMADol (ULTRAM) 50 mg tablet    colchicine (COLCRYS) 0 6 mg tablet    indomethacin (INDOCIN) 50 mg capsule    Multiple Vitamin (MULTIVITAMIN) capsule    naproxen sodium (ALEVE) 220 MG tablet    pantoprazole (PROTONIX) 40 mg tablet    No Known Allergies        Objective     Blood pressure 140/92, pulse 76, height 6' 2" (1 88 m), weight 95 1 kg (209 lb 9 6 oz)  Body mass index is 26 91 kg/m²  PHYSICAL EXAM:      General Appearance:   Alert, cooperative, no distress   HEENT:   Normocephalic, atraumatic, anicteric      Neck:  Supple, symmetrical, trachea midline   Lungs:   Clear to auscultation bilaterally; no rales, rhonchi or wheezing; respirations unlabored    Heart[de-identified]   Regular rate and rhythm; no murmur, rub, or gallop  Abdomen:   Soft, non-tender, non-distended; normal bowel sounds; no masses, no organomegaly    Genitalia:   Deferred    Rectal:   Deferred    Extremities:  No cyanosis, clubbing or edema    Pulses:  2+ and symmetric    Skin:  No jaundice, rashes, or lesions    Lymph nodes:  No palpable cervical lymphadenopathy        Lab Results:   No visits with results within 1 Day(s) from this visit  Latest known visit with results is:   Appointment on 03/03/2021   Component Date Value    PSA 03/03/2021 0 4          Radiology Results:   Ct Renal Stone Study Abdomen Pelvis Wo Contrast    Result Date: 3/8/2021  Narrative: CT ABDOMEN AND PELVIS WITHOUT IV CONTRAST - LOW DOSE RENAL STONE INDICATION:   R10 9: Unspecified abdominal pain   "  Patient reports gluteal pain on the right side radiating into his groin and down into his right leg  " COMPARISON:  CT abdomen pelvis 11/13/2020 TECHNIQUE:  Low dose thin section CT examination of the abdomen and pelvis was performed without intravenous or oral contrast according to a protocol specifically designed to evaluate for urinary tract calculus  Axial, sagittal, and coronal 2D reformatted images were created from the source data and submitted for interpretation  Evaluation for pathology in the abdomen and pelvis that is unrelated to urinary tract calculi is limited  Radiation dose length product (DLP) for this visit:  341 mGy-cm     This examination, like all CT scans performed in the [facility], was performed utilizing techniques to minimize radiation dose exposure, including the use of iterative reconstruction and automated exposure control  FINDINGS: RIGHT KIDNEY AND URETER: No urinary tract calculi  No hydronephrosis or hydroureter  LEFT KIDNEY AND URETER: No urinary tract calculi  No hydronephrosis or hydroureter  URINARY BLADDER: Unremarkable  No significant abnormality in the visualized lung bases  Limited low radiation dose noncontrast CT evaluation demonstrates no clinically significant abnormality of liver, spleen, pancreas, or adrenal glands  No calcified gallstones or gallbladder wall thickening noted  No ascites or bulky lymphadenopathy on this limited noncontrast study  Bowel loops appear unremarkable  Noninflamed appendix  Small uncomplicated fat-containing umbilical hernia unchanged from the prior study  No acute fracture or destructive osseous lesion is identified  Impression: No acute findings  No renal calculi  No findings to account for this patient's gluteal pain   Workstation performed: [id number]

## 2021-04-02 DIAGNOSIS — R10.31 RIGHT LOWER QUADRANT PAIN: ICD-10-CM

## 2021-04-02 RX ORDER — TRAMADOL HYDROCHLORIDE 50 MG/1
50 TABLET ORAL EVERY 8 HOURS PRN
Qty: 30 TABLET | Refills: 0 | Status: SHIPPED | OUTPATIENT
Start: 2021-04-02 | End: 2021-07-27

## 2021-04-02 NOTE — TELEPHONE ENCOUNTER
Cont[place]    [place]  reviewed: No red flags were identified; safe to proceed with prescription      PDMP Review       Value Time User    PDMP Reviewed  Yes 4/2/2021  9:36 AM Nikolay Sales DO

## 2021-04-12 ENCOUNTER — HOSPITAL ENCOUNTER (OUTPATIENT)
Dept: MRI IMAGING | Facility: HOSPITAL | Age: 56
Discharge: HOME | End: 2021-04-12
Payer: COMMERCIAL

## 2021-04-12 DIAGNOSIS — R10.31 RIGHT LOWER QUADRANT ABDOMINAL PAIN: ICD-10-CM

## 2021-04-12 PROCEDURE — A9585 GADOBUTROL INJECTION: HCPCS | Performed by: PHYSICIAN ASSISTANT

## 2021-04-12 PROCEDURE — G1004 CDSM NDSC: HCPCS

## 2021-04-12 PROCEDURE — 72158 MRI LUMBAR SPINE W/O & W/DYE: CPT

## 2021-04-12 RX ADMIN — GADOBUTROL 9 ML: 604.72 INJECTION INTRAVENOUS at 08:50

## 2021-04-20 ENCOUNTER — ANESTHESIA EVENT (OUTPATIENT)
Dept: GASTROENTEROLOGY | Facility: HOSPITAL | Age: 56
End: 2021-04-20

## 2021-04-20 ENCOUNTER — HOSPITAL ENCOUNTER (OUTPATIENT)
Dept: GASTROENTEROLOGY | Facility: HOSPITAL | Age: 56
Setting detail: OUTPATIENT SURGERY
Discharge: HOME | End: 2021-04-20
Attending: INTERNAL MEDICINE
Payer: COMMERCIAL

## 2021-04-20 ENCOUNTER — ANESTHESIA (OUTPATIENT)
Dept: GASTROENTEROLOGY | Facility: HOSPITAL | Age: 56
End: 2021-04-20

## 2021-04-20 VITALS
TEMPERATURE: 98.9 F | BODY MASS INDEX: 26.26 KG/M2 | HEART RATE: 66 BPM | SYSTOLIC BLOOD PRESSURE: 125 MMHG | OXYGEN SATURATION: 98 % | RESPIRATION RATE: 18 BRPM | HEIGHT: 74 IN | DIASTOLIC BLOOD PRESSURE: 89 MMHG | WEIGHT: 204.59 LBS

## 2021-04-20 DIAGNOSIS — R10.31 RIGHT LOWER QUADRANT ABDOMINAL PAIN: ICD-10-CM

## 2021-04-20 DIAGNOSIS — R10.84 GENERALIZED ABDOMINAL PAIN: ICD-10-CM

## 2021-04-20 PROBLEM — N20.0 NEPHROLITHIASIS: Status: ACTIVE | Noted: 2021-04-20

## 2021-04-20 PROBLEM — R10.30 LOWER ABDOMINAL PAIN: Status: ACTIVE | Noted: 2021-04-20

## 2021-04-20 PROCEDURE — 45378 DIAGNOSTIC COLONOSCOPY: CPT | Performed by: INTERNAL MEDICINE

## 2021-04-20 RX ORDER — SODIUM CHLORIDE, SODIUM LACTATE, POTASSIUM CHLORIDE, CALCIUM CHLORIDE 600; 310; 30; 20 MG/100ML; MG/100ML; MG/100ML; MG/100ML
INJECTION, SOLUTION INTRAVENOUS CONTINUOUS PRN
Status: DISCONTINUED | OUTPATIENT
Start: 2021-04-20 | End: 2021-04-20

## 2021-04-20 RX ORDER — PROPOFOL 10 MG/ML
INJECTION, EMULSION INTRAVENOUS AS NEEDED
Status: DISCONTINUED | OUTPATIENT
Start: 2021-04-20 | End: 2021-04-20

## 2021-04-20 RX ADMIN — PROPOFOL 100 MG: 10 INJECTION, EMULSION INTRAVENOUS at 07:56

## 2021-04-20 RX ADMIN — PROPOFOL 100 MG: 10 INJECTION, EMULSION INTRAVENOUS at 07:58

## 2021-04-20 RX ADMIN — PROPOFOL 100 MG: 10 INJECTION, EMULSION INTRAVENOUS at 07:55

## 2021-04-20 RX ADMIN — SODIUM CHLORIDE, SODIUM LACTATE, POTASSIUM CHLORIDE, AND CALCIUM CHLORIDE: .6; .31; .03; .02 INJECTION, SOLUTION INTRAVENOUS at 07:53

## 2021-04-20 NOTE — ANESTHESIA POSTPROCEDURE EVALUATION
Post-Op Assessment Note    CV Status:  Stable  Pain Score: 1    Pain management: adequate     Mental Status:  Sleepy   PONV Controlled:  Controlled   Airway Patency:  Patent      Post Op Vitals Reviewed: Yes      Staff: Anesthesiologist, CRNA         No complications documented      BP      Temp      Pulse     Resp      SpO2

## 2021-04-20 NOTE — H&P
History and Physical - [facility]  Alta Larson 60 y o  male MRN: [id number]      HPI: Alta Larson is a 60y o  year old male who presents for evaluation of abdominal pain      REVIEW OF SYSTEMS: Per the HPI, and otherwise unremarkable      Historical Information   Past Medical History:   Diagnosis Date    Gout     Hypertension     resolved    Mitral valve prolapse     Neutropenia (HCC)     Thrombocytosis (HCC)     last assessed: 2016     Past Surgical History:   Procedure Laterality Date    HAND SURGERY      ORTHOPEDIC SURGERY Left     hand fracture repaired    ME CYSTO/URETERO W/LITHOTRIPSY &INDWELL STENT INSRT Right 2016    Procedure: CYSTOSCOPY; URETEROSCOPY;  RETROGRADE PYELOGRAM; STENT ;  Surgeon: Sonu Card MD;  [facility];  Service: Urology    SHOULDER ARTHROSCOPY W/ LABRAL REPAIR      SHOULDER SURGERY       Social History   Social History     Substance and Sexual Activity   Alcohol Use Yes    Alcohol/week: 6 0 standard drinks    Types: 6 Cans of beer per week    Frequency: 2-3 times a week    Drinks per session: 1 or 2    Binge frequency: Never    Comment: week; social (as per allscripts)     Social History     Substance and Sexual Activity   Drug Use No     Social History     Tobacco Use   Smoking Status Former Smoker    Packs/day: 0 03    Years: 3 00    Pack years: 0 09    Types: Cigarettes    Quit date: 1999    Years since quittin 9   Smokeless Tobacco Never Used     Family History   Problem Relation Age of Onset    Stomach cancer Father     Hypertension Father         benign essential    Diverticulosis Father     Breast cancer Sister     Diabetes Maternal Grandmother         mellitus    Ovarian cancer Paternal Grandmother     Ovarian cancer Child     Breast cancer Maternal Aunt     Lung cancer Paternal Uncle     Pancreatic cancer Paternal Uncle     Lung cancer Cousin        Meds/Allergies     (Not in a hospital admission)      No Known Allergies    Objective     Blood pressure 157/97, pulse 72, temperature 97 5 °F (36 4 °C), temperature source Temporal, resp  rate 16, height 6' 2" (1 88 m), weight 92 8 kg (204 lb 9 4 oz), SpO2 100 %  PHYSICAL EXAM    Gen: NAD  CV: RRR  CHEST: Clear  ABD: soft, NT/ND  EXT: no edema      ASSESSMENT/PLAN:  This is a 60y o  year old male here for colonoscopy, and he is stable and optimized for his procedure

## 2021-04-20 NOTE — ANESTHESIA PREPROCEDURE EVALUATION
Procedure:  COLONOSCOPY    Relevant Problems   CARDIO   (+) Mitral valve prolapse      /RENAL   (+) Nephrolithiasis      Other   (+) Lower abdominal pain        Physical Exam    Airway    Mallampati score: II  TM Distance: >3 FB  Neck ROM: full     Dental       Cardiovascular      Pulmonary      Other Findings        Anesthesia Plan  ASA Score- 2     Anesthesia Type- IV sedation with anesthesia with ASA Monitors  Additional Monitors:   Airway Plan:     Comment: Recent labs personally reviewed:  Lab Results       Component                Value               Date                       WBC                      4 71                11/10/2020                 HGB                      15 7                11/10/2020                 PLT                      318                 11/10/2020            Lab Results       Component                Value               Date                       NA                       137                 05/11/2016                 K                        3 8                 11/10/2020                 BUN                      13                  11/10/2020                 CREATININE               1 35 (H)            11/10/2020                 GLUCOSE                  97                  05/11/2016            No results found for: PTT   No results found for: INR    Blood type     I, Aparna Blakely MD, have personally seen and evaluated the patient prior to anesthetic care  I have reviewed the pre-anesthetic record, medical history, allergies, medications and any other medical records if appropriate to the anesthetic care  If a CRNA is involved in the case, I have reviewed the CRNA assessment, if present, and agree  Patient consented for IV Sedation, general anesthesia as back up  Discussed risks of aspiration, IV infiltration, indications for conversion to general anesthesia  All questions and concerns addressed          Plan Factors-Exercise tolerance (METS): >4 METS  Chart reviewed  Existing labs reviewed  Patient is not a current smoker  Patient did not smoke on day of surgery  Obstructive sleep apnea risk education given perioperatively  Induction- intravenous  Postoperative Plan-     Informed Consent- Anesthetic plan and risks discussed with patient  I personally reviewed this patient with the CRNA  Discussed and agreed on the Anesthesia Plan with the CRNA  Michael Cox

## 2021-04-20 NOTE — DISCHARGE INSTRUCTIONS
Colonoscopy   WHAT YOU NEED TO KNOW:   A colonoscopy is a procedure to examine the inside of your colon (intestine) with a scope  Polyps or tissue growths may have been removed during your colonoscopy  It is normal to feel bloated and to have some abdominal discomfort  You should be passing gas  If you have hemorrhoids or you had polyps removed, you may have a small amount of bleeding  DISCHARGE INSTRUCTIONS:   Seek care immediately if:    You have sudden, severe abdominal pain   You have problems swallowing   You have a large amount of black, sticky bowel movements or blood in your bowel movements   You have sudden trouble breathing   You feel weak, lightheaded, or faint or your heart beats faster than normal for you  Contact your healthcare provider if:    You have a fever and chills   You have nausea or are vomiting   Your abdomen is bloated or feels full and hard   You have abdominal pain   You have not had a bowel movement for 3 days after your procedure   You have rash or hives   You have questions or concerns about your procedure  Activity:    Do not lift, strain, or run for 24 hours after your procedure   Rest after your procedure  You have been given medicine to relax you  Do not drive or make important decisions until the day after your procedure  Return to your normal activity as directed   Relieve gas and discomfort from bloating by lying on your right side with a heating pad on your abdomen  You may need to take short walks to help the gas move out  Eat small meals until bloating is relieved  Follow up with your healthcare provider as directed: Write down your questions so you remember to ask them during your visits  If you take a blood thinner, please review the specific instructions from your endoscopist about when you should resume it   These can be found in the Recommendation and Your Medication list sections of this After Visit Summary

## 2021-04-21 ENCOUNTER — TELEPHONE (OUTPATIENT)
Dept: GASTROENTEROLOGY | Facility: CLINIC | Age: 56
End: 2021-04-21

## 2021-04-21 NOTE — TELEPHONE ENCOUNTER
----- Message from Alaina Moore PA-C sent at 4/16/2021 12:16 PM EDT -----  Please let him know this showed arthritis of the lower spine  It is not severe but may be the source of his symptoms    He should f/u with his PCP

## 2021-04-21 NOTE — TELEPHONE ENCOUNTER
----- Message from Jerrod García PA-C sent at 4/16/2021 12:16 PM EDT -----  Please let him know this showed arthritis of the lower spine  It is not severe but may be the source of his symptoms    He should f/u with his PCP

## 2021-06-08 ENCOUNTER — OFFICE VISIT (OUTPATIENT)
Dept: INTERNAL MEDICINE CLINIC | Facility: CLINIC | Age: 56
End: 2021-06-08
Payer: COMMERCIAL

## 2021-06-08 VITALS
TEMPERATURE: 99.9 F | HEART RATE: 79 BPM | WEIGHT: 206 LBS | OXYGEN SATURATION: 98 % | SYSTOLIC BLOOD PRESSURE: 140 MMHG | HEIGHT: 74 IN | BODY MASS INDEX: 26.44 KG/M2 | DIASTOLIC BLOOD PRESSURE: 96 MMHG

## 2021-06-08 DIAGNOSIS — T30.0 BURN: Primary | ICD-10-CM

## 2021-06-08 PROCEDURE — 3008F BODY MASS INDEX DOCD: CPT | Performed by: NURSE PRACTITIONER

## 2021-06-08 PROCEDURE — 1036F TOBACCO NON-USER: CPT | Performed by: NURSE PRACTITIONER

## 2021-06-08 PROCEDURE — 99213 OFFICE O/P EST LOW 20 MIN: CPT | Performed by: NURSE PRACTITIONER

## 2021-06-08 NOTE — PATIENT INSTRUCTIONS
Acute Wound Care   AMBULATORY CARE:   An acute wound  is an injury that causes a break in the skin  An acute wound can happen suddenly, last a short time, and may heal on its own  Common signs and symptoms of an acute wound:   · A cut, tear, or gash in your skin    · Bleeding, swelling, pain, or trouble moving the affected area    · Dirt or foreign objects inside the wound     · Milky, yellow, green, or brown pus in the wound     · Red, tender, or warm area around the pus    · Fever  Seek care immediately if:   · You have pus or a foul odor coming from the wound  · You have sudden trouble breathing or chest pain  · Blood soaks through your bandage  Contact your healthcare provider if:   · You have muscle, joint, or body aches, sweating, or a fever  · You have more swelling, redness, or bleeding in your wound  · Your skin is itchy, swollen, or you have a rash  · You have questions or concerns about your condition or care  Treatment for an acute wound  may include any of the following:  · Cleansing  is done with soap and water to wash away germs and decrease the risk of infection  Sterile water further cleans the wound  The cleaning is done under high pressure with a catheter tip and large syringe  A solution that kills germs may also be used  · Debridement  is done to clean and remove objects, dirt, or dead tissues from the open wound  Healthcare providers may also drain the wound to clean out pus  · Closure of the wound  is done with stitches, staples, skin adhesive, or other treatments  This may be done if the wound is wide or deep  Stitches may be needed if the wound is in an area that moves a lot, such as the hands, feet, and joints  Stitches may help to keep the wound from getting infected  They may also decrease the amount of scarring you have  Some wounds may heal better without stitches    Wound care:   · If your wound was closed with thin strips of medical tape, keep them clean and dry  The strips of medical tape will fall off on their own  Do not pull them off  · Keep the bandage clean and dry  Do not remove the bandage over your wound unless your healthcare provider says it is okay  · Wash your hands before and after you take care of your wound to prevent infection  · Clean the wound as directed  If you cannot reach the wound, have someone help you  · If you have packing, make sure all the gauze used to pack the wound is taken out and replaced as directed  Keep track of how many gauze dressings are placed inside the wound  Follow up with your healthcare provider as directed:  Write down your questions so you remember to ask them during your visits  © 2016 [employer] is for End User's use only and may not be sold, redistributed or otherwise used for commercial purposes  All illustrations and images included in CareNotes® are the copyrighted property of A D [employer] M [employer]  or Antonino Cantu  The above information is an  only  It is not intended as medical advice for individual conditions or treatments  Talk to your doctor, nurse or pharmacist before following any medical regimen to see if it is safe and effective for you

## 2021-06-08 NOTE — PROGRESS NOTES
INTERNAL MEDICINE FOLLOW-UP OFFICE VISIT  [facility]    NAME: Selina Andrade  AGE: 60 y o  SEX: male    DATE OF ENCOUNTER: 6/8/2021   Assessment and Plan:   Advised patient to begin using silvadene ointment to finger bid and wrap with gauze  Will reach out to wound management for further advise if debridement would be needed  Patient had normal sensation and scant decreased range of motion of finger currently  There is stiffness due to swelling  Problem List Items Addressed This Visit     None      Visit Diagnoses     Burn    -  Primary    Relevant Medications    silver sulfadiazine (Silvadene) 1 % cream          No follow-ups on file  Counseling:     · Medication Side Effects - Adverse side effects of medications were reviewed with the patient/guardian today: Yes  · Counseling was given regarding: Prognosis, Risks and benefits of tx options, Intructions for management, Patient and family education, Importance of tx compliance, Risk factor reductions and Impressions  · Barriers to treatment include: No identified barriers      Chief Complaint:     Chief Complaint   Patient presents with    Follow-up     Burnt left index finger, patient stated it happened Sunday evening and patient has been putting neosporin ion the burn         History of Present Illness:     Two days ago grabbed a hot piece of metal accidentally  States it was a long piece of metal similar to the otoscopy in the exam room  He grabbed this and then dropped it immediately when he realized it was hot  Patient burned the index finger of the left hand  He is left handed  Patient states the skin was very white after the burn  He does not have any pain but tightness  States it feels like it is getting tighter because of the blister on the hand  Started out with a small blister that is getting larger  He has been applying neosporin to the burn   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 so he does have some friction on the finger  States he put a folded up tissue between his fingefs to decrease this friction  The following portions of the patient's history were reviewed and updated as appropriate: allergies, current medications, past family history, past medical history, past social history, past surgical history and problem list      Review of Systems:     Review of Systems   Constitutional: Negative for chills, fatigue and fever  Musculoskeletal: Negative for arthralgias, joint swelling and myalgias  Skin: Positive for color change and wound  Neurological: Negative for weakness and numbness  Problem List:     Patient Active Problem List   Diagnosis    Macrocytosis    Mitral valve prolapse    Bunion, right foot    Seasonal allergic rhinitis due to pollen    Impaired fasting glucose    Nephrolithiasis    Lower abdominal pain        Objective:     /96 (BP Location: Left arm, Patient Position: Sitting, Cuff Size: Standard) Comment: bp  Pulse 79   Temp 99 9 °F (37 7 °C) (Temporal) Comment: NO NSAIDS  Ht 6' 2" (1 88 m)   Wt 93 4 kg (206 lb)   SpO2 98%   BMI 26 45 kg/m²     Physical Exam  Vitals signs reviewed  Constitutional:       General: He is not in acute distress  Appearance: Normal appearance  He is well-developed and well-groomed  He is not ill-appearing  HENT:      Head: Normocephalic and atraumatic  Musculoskeletal:      Left hand: He exhibits swelling  He exhibits no tenderness and no bony tenderness  Normal sensation noted  Hands:       Comments: Intact blister on left index finger  See image  Skin:     General: Skin is warm and dry  Capillary Refill: Capillary refill takes less than 2 seconds  Coloration: Skin is pale  Findings: Burn and rash present  Rash is vesicular  Neurological:      Mental Status: He is alert and oriented to person, place, and time        Motor: Motor function is intact  Psychiatric:         Attention and Perception: Attention normal          Mood and Affect: Mood normal          Speech: Speech normal          Behavior: Behavior normal  Behavior is cooperative  Thought Content: Thought content normal                  Pertinent Laboratory/Diagnostic Studies:    Laboratory Results: I have personally reviewed the pertinent laboratory results/reports   Radiology/Other Diagnostic Testing Results: I have personally reviewed pertinent reports  Current Medications:     Current Outpatient Medications   Medication Sig Dispense Refill    allopurinol (ZYLOPRIM) 300 mg tablet Take 300 mg by mouth daily      Multiple Vitamin (MULTIVITAMIN) capsule Take 1 capsule by mouth daily      pantoprazole (PROTONIX) 40 mg tablet Take 1 tablet (40 mg total) by mouth daily 90 tablet 1    traMADol (ULTRAM) 50 mg tablet Take 1 tablet (50 mg total) by mouth every 8 (eight) hours as needed for moderate pain 30 tablet 0    colchicine (COLCRYS) 0 6 mg tablet Take 0 6 mg by mouth 2 (two) times a day      indomethacin (INDOCIN) 50 mg capsule take 1 capsule by mouth three times a day after meals       No current facility-administered medications for this visit  There are no Patient Instructions on file for this visit      JIGNESH Shipley  [facility] [address] [place] [address]

## 2021-06-15 ENCOUNTER — OFFICE VISIT (OUTPATIENT)
Dept: WOUND CARE | Facility: CLINIC | Age: 56
End: 2021-06-15
Payer: COMMERCIAL

## 2021-06-15 VITALS
DIASTOLIC BLOOD PRESSURE: 91 MMHG | TEMPERATURE: 98.9 F | RESPIRATION RATE: 18 BRPM | HEIGHT: 74 IN | HEART RATE: 80 BPM | SYSTOLIC BLOOD PRESSURE: 140 MMHG | WEIGHT: 206 LBS | BODY MASS INDEX: 26.44 KG/M2

## 2021-06-15 DIAGNOSIS — T23.222A SECOND DEGREE BURN OF FINGER OF LEFT HAND, INITIAL ENCOUNTER: Primary | ICD-10-CM

## 2021-06-15 PROCEDURE — 99213 OFFICE O/P EST LOW 20 MIN: CPT | Performed by: FAMILY MEDICINE

## 2021-06-15 PROCEDURE — G0463 HOSPITAL OUTPT CLINIC VISIT: HCPCS | Performed by: FAMILY MEDICINE

## 2021-06-15 RX ORDER — LIDOCAINE HYDROCHLORIDE 40 MG/ML
5 SOLUTION TOPICAL ONCE
Status: COMPLETED | OUTPATIENT
Start: 2021-06-15 | End: 2021-06-15

## 2021-06-15 RX ADMIN — LIDOCAINE HYDROCHLORIDE 5 ML: 40 SOLUTION TOPICAL at 14:41

## 2021-06-15 NOTE — ASSESSMENT & PLAN NOTE
Initial evaluation   improved function and sensation since last week when discussed the case with his PCP  Change topical wound management to bacitracin and stop the Silvadene  Keep the area covered and protected  No harsh cleanser is a such as alcohol, peroxide, or antibacterial soap    no soaks  Followup in 1 week or call sooner with questions or concerns

## 2021-06-15 NOTE — PROGRESS NOTES
Patient ID: Keith Miranda is a 60 y o  male Date of Birth 1965     Chief Complaint  Chief Complaint   Patient presents with    Follow Up Wound Care Visit     L index finger       Allergies  Patient has no known allergies  Assessment:    Second degree burn of finger of left hand, initial encounter    Initial evaluation   improved function and sensation since last week when discussed the case with his PCP  Change topical wound management to bacitracin and stop the Silvadene  Keep the area covered and protected  No harsh cleanser is a such as alcohol, peroxide, or antibacterial soap    no soaks  Followup in 1 week or call sooner with questions or concerns       Diagnoses and all orders for this visit:    Second degree burn of finger of left hand, initial encounter  -     lidocaine (XYLOCAINE) 4 % topical solution 5 mL  -     Wound cleansing and dressings; Future              Procedures    Plan:     Wound 06/15/21 Burn Finger (Comment which one) Anterior; Left (Active)   Wound Image Images linked 06/15/21 1439       Incision 06/21/16 Penis Other (Comment) (Active)   Date First Assessed/Time First Assessed: 06/21/16 1236   Location: Penis  Wound Location Orientation: Other (Comment)  Wound Description (Comments):  string external from stent       Wound 06/15/21 Burn Finger (Comment which one) Anterior; Left (Active)   Date First Assessed/Time First Assessed: 06/15/21 1437   Pre-Existing Wound: Yes  Primary Wound Type: Burn  Location: (c) Finger (Comment which one)  Wound Location Orientation: Anterior; Left       Subjective:        Patient presents for initial evaluation of burn on the left index finger after grabbing hot metal accidentally about a week and half ago  Patient has an intact blister and has been using topical Silvadene since last week after seeing his PCP  Patient reports that he is noted improvement since then both sensation and mobility of the finger have improved  No diabetes    No smoking  The following portions of the patient's history were reviewed and updated as appropriate: He  has a past medical history of Gout, Hypertension, Mitral valve prolapse, Neutropenia ([address] ), and Th[address]ocytosis ([facility] [address] )  He   Patient Active Problem List    Diagnosis Date Noted    Second degree burn of finger of left hand, initial encounter 06/15/2021    Nephrolithiasis 04/20/2021    Lower abdominal pain 04/20/2021    Impaired fasting glucose 10/02/2018    Seasonal allergic rhinitis due to pollen 09/18/2018    Bunion, right foot 11/02/2017    Macrocytosis 06/11/2015    Mitral valve prolapse 11/20/2007     He  has a past surgical history that includes orthopedic surgery (Left); Shoulder arthroscopy w/ labral repair; pr cysto/uretero w/lithotripsy &indwell stent insrt (Right, 6/21/2016); Hand surgery; and Shoulder surgery  He  reports that he quit smoking about 22 years ago  His smoking use included cigarettes  He has a 0 09 pack-year smoking history  He has never used smokeless tobacco  He reports current alcohol use of about 6 0 standard drinks of alcohol per week  He reports that he does not use drugs  Current Outpatient Medications   Medication Sig Dispense Refill    allopurinol (ZYLOPRIM) 300 mg tablet Take 300 mg by mouth daily      Multiple Vitamin (MULTIVITAMIN) capsule Take 1 capsule by mouth daily      pantoprazole (PROTONIX) 40 mg tablet Take 1 tablet (40 mg total) by mouth daily 90 tablet 1    silver sulfadiazine (Silvadene) 1 % cream Apply topically 2 (two) times a day 50 g 0    traMADol (ULTRAM) 50 mg tablet Take 1 tablet (50 mg total) by mouth every 8 (eight) hours as needed for moderate pain 30 tablet 0     No current facility-administered medications for this visit  He has No Known Allergies       Review of Systems   Constitutional: Negative for chills and fever  HENT: Negative for congestion and sneezing  Respiratory: Negative for cough      Musculoskeletal: Negative for gait problem  Skin: Positive for wound  Psychiatric/Behavioral: Negative for agitation  Objective:       Wound 06/15/21 Burn Finger (Comment which one) Anterior; Left (Active)   Wound Image Images linked 06/15/21 1439       /91   Pulse 80   Temp 98 9 °F (37 2 °C)   Resp 18   Ht 6' 2" (1 88 m)   Wt 93 4 kg (206 lb)   BMI 26 45 kg/m²     Physical Exam  Constitutional:       General: He is not in acute distress  Appearance: Normal appearance  He is not ill-appearing, toxic-appearing or diaphoretic  HENT:      Head: Normocephalic and atraumatic  Right Ear: External ear normal       Left Ear: External ear normal    Eyes:      Conjunctiva/sclera: Conjunctivae normal    Pulmonary:      Effort: Pulmonary effort is normal  No respiratory distress  Musculoskeletal:      Left hand: Normal range of motion (left index finger)  Cervical back: Neck supple  Skin:     Comments: See wound assessment  Intact blister located on the proximal portion of the left index finger   Neurological:      Mental Status: He is alert  Gait: Gait normal    Psychiatric:         Mood and Affect: Mood normal          Behavior: Behavior normal            Incision 06/21/16 Penis Other (Comment) (Active)       Wound 06/15/21 Burn Finger (Comment which one) Anterior; Left (Active)   Wound Image    06/15/21 1439                         Wound Instructions:  Orders Placed This Encounter   Procedures    Wound cleansing and dressings     Left index finger:    Wash your hands with soap and water  Remove old dressing, discard into plastic bag and place in trash  Cleanse the wound with mild unscented dove soap and water prior to applying a clean dressing  Do not use tissue or cotton balls  Do not scrub the wound  Pat dry using gauze  OK to shower    Apply bacitracin (NO neosporin) to the burn and cover with gauze  This was done today at the wound care center  See back in 1 week       Standing Status:   Future Standing Expiration Date:   6/15/2022        Diagnosis ICD-10-CM Associated Orders   1   Second degree burn of finger of left hand, initial encounter  T23 222A lidocaine (XYLOCAINE) 4 % topical solution 5 mL     Wound cleansing and dressings

## 2021-06-15 NOTE — PATIENT INSTRUCTIONS
Orders Placed This Encounter   Procedures    Wound cleansing and dressings     Left index finger:    Wash your hands with soap and water  Remove old dressing, discard into plastic bag and place in trash  Cleanse the wound with mild unscented dove soap and water prior to applying a clean dressing  Do not use tissue or cotton balls  Do not scrub the wound  Pat dry using gauze  OK to shower    Apply bacitracin (NO neosporin) to the burn and cover with gauze  Change 1-2 times per day or as needed for soiled bandage    Do not submerge hand in water  Wear gloves if you will be washing dishes  This was done today at the wound care center  See back in 1 week       Standing Status:   Future     Standing Expiration Date:   6/15/2022

## 2021-06-22 ENCOUNTER — OFFICE VISIT (OUTPATIENT)
Dept: WOUND CARE | Facility: CLINIC | Age: 56
End: 2021-06-22
Payer: COMMERCIAL

## 2021-06-22 VITALS
HEART RATE: 66 BPM | TEMPERATURE: 97.6 F | DIASTOLIC BLOOD PRESSURE: 120 MMHG | SYSTOLIC BLOOD PRESSURE: 152 MMHG | RESPIRATION RATE: 18 BRPM

## 2021-06-22 DIAGNOSIS — T23.222A SECOND DEGREE BURN OF FINGER OF LEFT HAND, INITIAL ENCOUNTER: Primary | ICD-10-CM

## 2021-06-22 PROCEDURE — G0463 HOSPITAL OUTPT CLINIC VISIT: HCPCS | Performed by: FAMILY MEDICINE

## 2021-06-22 PROCEDURE — 99213 OFFICE O/P EST LOW 20 MIN: CPT | Performed by: FAMILY MEDICINE

## 2021-06-22 NOTE — PROGRESS NOTES
Patient ID: Haleigh Cordova is a 60 y o  male Date of Birth 1965     Chief Complaint  Chief Complaint   Patient presents with    Follow Up Wound Care Visit     second finger left hand       Allergies  Patient has no known allergies  Assessment:    Second degree burn of finger of left hand, initial encounter  Much improved, no open wound  I recommend continuing topical bacitracin or simply using a moisturizer  Follow up p r n  Or call with questions or concerns       Diagnoses and all orders for this visit:    Second degree burn of finger of left hand, initial encounter  -     Wound cleansing and dressings; Future              Procedures    Plan:     Wound 06/15/21 Burn Finger (Comment which one) Anterior; Left (Active)   Wound Image Images linked 06/22/21 1540   Wound Description Other (Comment); Epithelialization (intact skin) 06/22/21 1539   Alice-wound Assessment Intact 06/22/21 1539   Wound Length (cm) 2 cm 06/22/21 1539   Wound Width (cm) 5 cm 06/22/21 1539   Wound Depth (cm) 0 cm 06/22/21 1539   Wound Surface Area (cm^2) 10 cm^2 06/22/21 1539   Wound Volume (cm^3) 0 cm^3 06/22/21 1539   Calculated Wound Volume (cm^3) 0 cm^3 06/22/21 1539   Drainage Amount None 06/22/21 1539   Non-staged Wound Description Full thickness 06/22/21 1539       Incision 06/21/16 Penis Other (Comment) (Active)   Date First Assessed/Time First Assessed: 06/21/16 1236   Location: Penis  Wound Location Orientation: Other (Comment)  Wound Description (Comments):  string external from stent       Wound 06/15/21 Burn Finger (Comment which one) Anterior; Left (Active)   Date First Assessed/Time First Assessed: 06/15/21 1437   Pre-Existing Wound: Yes  Primary Wound Type: Burn  Location: (c) Finger (Comment which one)  Wound Location Orientation: Anterior; Left       Subjective:        Patient presents for followup of a 2nd degree burn of the left index finger    Patient has been using bacitracin on the area and notes significant improvement  Patient has full range of motion the finger  He reports that there is no pain or drainage  The following portions of the patient's history were reviewed and updated as appropriate: He  has a past medical history of Gout, Hypertension, Mitral valve prolapse, Neutropenia ([address] ), and Thrombocytosis ([address] )  He  reports that he quit smoking about 22 years ago  His smoking use included cigarettes  He has a 0 09 pack-year smoking history  He has never used smokeless tobacco  He reports current alcohol use of about 6 0 standard drinks of alcohol per week  He reports that he does not use drugs  He has No Known Allergies       Review of Systems   Constitutional: Negative for chills and fever  HENT: Negative for congestion and sneezing  Respiratory: Negative for cough  Musculoskeletal: Negative for gait problem  Skin: Positive for color change  Negative for wound  Psychiatric/Behavioral: Negative for agitation  Objective:       Wound 06/15/21 Burn Finger (Comment which one) Anterior; Left (Active)   Wound Image Images linked 06/22/21 1540   Wound Description Other (Comment); Epithelialization (intact skin) 06/22/21 1539   Alice-wound Assessment Intact 06/22/21 1539   Wound Length (cm) 2 cm 06/22/21 1539   Wound Width (cm) 5 cm 06/22/21 1539   Wound Depth (cm) 0 cm 06/22/21 1539   Wound Surface Area (cm^2) 10 cm^2 06/22/21 1539   Wound Volume (cm^3) 0 cm^3 06/22/21 1539   Calculated Wound Volume (cm^3) 0 cm^3 06/22/21 1539   Drainage Amount None 06/22/21 1539   Non-staged Wound Description Full thickness 06/22/21 1539       BP (!) 152/120   Pulse 66   Temp 97 6 °F (36 4 °C)   Resp 18     Physical Exam  Constitutional:       General: He is not in acute distress  Appearance: Normal appearance  He is not ill-appearing, toxic-appearing or diaphoretic  HENT:      Head: Normocephalic and atraumatic        Right Ear: External ear normal       Left Ear: External ear normal    Eyes: Conjunctiva/sclera: Conjunctivae normal    Pulmonary:      Effort: Pulmonary effort is normal  No respiratory distress  Musculoskeletal:      Cervical back: Neck supple  Skin:     Comments: See wound assessment  No open wound   Neurological:      Mental Status: He is alert  Gait: Gait normal    Psychiatric:         Mood and Affect: Mood normal          Behavior: Behavior normal            Incision 06/21/16 Penis Other (Comment) (Active)       Wound 06/15/21 Burn Finger (Comment which one) Anterior; Left (Active)   Wound Image   06/22/21 1540   Wound Description Other (Comment); Epithelialization 06/22/21 1539   Alice-wound Assessment Intact 06/22/21 1539   Wound Length (cm) 2 cm 06/22/21 1539   Wound Width (cm) 5 cm 06/22/21 1539   Wound Depth (cm) 0 cm 06/22/21 1539   Wound Surface Area (cm^2) 10 cm^2 06/22/21 1539   Wound Volume (cm^3) 0 cm^3 06/22/21 1539   Calculated Wound Volume (cm^3) 0 cm^3 06/22/21 1539   Drainage Amount None 06/22/21 1539   Non-staged Wound Description Full thickness 06/22/21 1539                         Wound Instructions:  Orders Placed This Encounter   Procedures    Wound cleansing and dressings     Wound cleansing and dressings         Left index finger:     Wash your hands with soap and water  Remove old dressing, discard into plastic bag and place in trash  Cleanse the wound with mild unscented dove soap and water prior to applying a clean dressing  Do not use tissue or cotton balls  Do not scrub the wound  Pat dry using gauze      OK to shower     Apply bacitracin or moisterizer (NO neosporin) to the burn daily     This was done today at the wound care center  Do not need to return unless wound opens then cover and call us     Standing Status:   Future     Standing Expiration Date:   6/22/2022        Diagnosis ICD-10-CM Associated Orders   1   Second degree burn of finger of left hand, initial encounter  T23 222A Wound cleansing and dressings

## 2021-06-22 NOTE — PATIENT INSTRUCTIONS
Orders Placed This Encounter   Procedures    Wound cleansing and dressings     Wound cleansing and dressings         Left index finger:     Wash your hands with soap and water  Remove old dressing, discard into plastic bag and place in trash  Cleanse the wound with mild unscented dove soap and water prior to applying a clean dressing  Do not use tissue or cotton balls  Do not scrub the wound  Pat dry using gauze      OK to shower     Apply bacitracin or moisterizer (NO neosporin) to the burn daily     This was done today at the wound care center       Do not need to return unless wound opens then cover and call us     Standing Status:   Future     Standing Expiration Date:   6/22/2022

## 2021-06-23 NOTE — ASSESSMENT & PLAN NOTE
Much improved, no open wound  I recommend continuing topical bacitracin or simply using a moisturizer  Follow up p r n   Or call with questions or concerns

## 2021-07-27 ENCOUNTER — OFFICE VISIT (OUTPATIENT)
Dept: INTERNAL MEDICINE CLINIC | Facility: CLINIC | Age: 56
End: 2021-07-27
Payer: COMMERCIAL

## 2021-07-27 VITALS
WEIGHT: 201.8 LBS | DIASTOLIC BLOOD PRESSURE: 78 MMHG | TEMPERATURE: 97 F | HEIGHT: 74 IN | OXYGEN SATURATION: 97 % | BODY MASS INDEX: 25.9 KG/M2 | HEART RATE: 68 BPM | SYSTOLIC BLOOD PRESSURE: 118 MMHG

## 2021-07-27 DIAGNOSIS — R10.31 RIGHT LOWER QUADRANT PAIN: Primary | ICD-10-CM

## 2021-07-27 DIAGNOSIS — R10.9 RIGHT SIDED ABDOMINAL PAIN: ICD-10-CM

## 2021-07-27 DIAGNOSIS — R10.13 EPIGASTRIC PAIN: ICD-10-CM

## 2021-07-27 PROCEDURE — 99214 OFFICE O/P EST MOD 30 MIN: CPT | Performed by: PHYSICIAN ASSISTANT

## 2021-07-27 RX ORDER — DICYCLOMINE HYDROCHLORIDE 10 MG/1
10 CAPSULE ORAL
Qty: 120 CAPSULE | Refills: 3 | Status: SHIPPED | OUTPATIENT
Start: 2021-07-27 | End: 2021-08-10

## 2021-07-27 NOTE — PROGRESS NOTES
Assessment/Plan:  Ongoing problem with abdominal pain right mid abdomen  Workup as below  physical exam is unremarkable  He currently feels some vague discomfort specific area mid axillary line just below his liver  Presumed irritable bowel symptoms follow-up in 2 weeks       Diagnoses and all orders for this visit:    Right lower quadrant pain  -     CT abdomen pelvis wo contrast; Future  -     Comprehensive metabolic panel; Future  -     CBC and differential; Future  -     Lipase; Future  -     UA w Reflex to Microscopic w Reflex to Culture  -     LD,Blood; Future  -     dicyclomine (BENTYL) 10 mg capsule; Take 1 capsule (10 mg total) by mouth 4 (four) times a day (before meals and at bedtime)    Right sided abdominal pain  -     CT abdomen pelvis wo contrast; Future  -     Comprehensive metabolic panel; Future  -     CBC and differential; Future  -     Lipase; Future  -     UA w Reflex to Microscopic w Reflex to Culture  -     LD,Blood; Future  -     dicyclomine (BENTYL) 10 mg capsule; Take 1 capsule (10 mg total) by mouth 4 (four) times a day (before meals and at bedtime)    Epigastric pain  -     CT abdomen pelvis wo contrast; Future  -     Comprehensive metabolic panel; Future  -     CBC and differential; Future  -     Lipase; Future  -     UA w Reflex to Microscopic w Reflex to Culture  -     LD,Blood; Future  -     dicyclomine (BENTYL) 10 mg capsule; Take 1 capsule (10 mg total) by mouth 4 (four) times a day (before meals and at bedtime)        No problem-specific Assessment & Plan notes found for this encounter  Subjective:      Patient ID: Dayan Paz is a 60 y o  male  He has noticed an area of abdominal pain right lower quadrant for 4 months  He has vague discomfort in area just below his liver above his [place] point that he sometimes describes as burning sometimes more severe sometimes just a vague feeling of discomfort    The more severe pain comes in episodes that last a few minutes randomly during the day with pain does not awaken from sleep  It is not affected by movements food or liquid he had a workup including colonoscopy in March that was negative  His appetite has been good no weight loss  He has intermittent alternating constipation with diarrhea no blood in the stool  The pain is not related to having a bowel movement no nausea or vomiting  He does not notice the pain during the day when he is working  No particular food intolerance  He has had some similar episodes of fleeting abdominal pains in the past couple of years  He has a history of hypertension gout which have been well controlled medication   History recent burn of his hand which has completely resolved      The following portions of the patient's history were reviewed and updated as appropriate:   He has a past medical history of Gout, Hypertension, Mitral valve prolapse, Neutropenia ([address] ), and Thrombocytosis ([address] )  ,  does not have any pertinent problems on file  ,   has a past surgical history that includes orthopedic surgery (Left); Shoulder arthroscopy w/ labral repair; pr cysto/uretero w/lithotripsy &indwell stent insrt (Right, 6/21/2016); Hand surgery; and Shoulder surgery  ,  family history includes Breast cancer in his maternal aunt and sister; Diabetes in his maternal grandmother; Diverticulosis in his father; Hypertension in his father; Lung cancer in his cousin and paternal uncle; Ovarian cancer in his child and paternal grandmother; Pancreatic cancer in his paternal uncle; Stomach cancer in his father  ,   reports that he quit smoking about 22 years ago  His smoking use included cigarettes  He has a 0 09 pack-year smoking history  He has never used smokeless tobacco  He reports current alcohol use of about 6 0 standard drinks of alcohol per week  He reports that he does not use drugs  ,  has No Known Allergies     Current Outpatient Medications   Medication Sig Dispense Refill    allopurinol (ZYLOPRIM) 300 mg tablet Take 300 mg by mouth daily      pantoprazole (PROTONIX) 40 mg tablet Take 1 tablet (40 mg total) by mouth daily 90 tablet 1    dicyclomine (BENTYL) 10 mg capsule Take 1 capsule (10 mg total) by mouth 4 (four) times a day (before meals and at bedtime) 120 capsule 3     No current facility-administered medications for this visit  Review of Systems   Constitutional: Negative for chills and fever  HENT: Negative for ear pain and sore throat  Eyes: Negative for pain and visual disturbance  Respiratory: Negative for cough and shortness of breath  Cardiovascular: Negative for chest pain and palpitations  Gastrointestinal: Positive for abdominal pain  Negative for abdominal distention, anal bleeding, blood in stool, constipation, diarrhea, nausea, rectal pain and vomiting  Genitourinary: Negative for dysuria and hematuria  Musculoskeletal: Negative for arthralgias and back pain  Skin: Negative for color change and rash  Neurological: Negative for seizures and syncope  All other systems reviewed and are negative  Objective:  Vitals:    07/27/21 1443   BP: 118/78   BP Location: Left arm   Patient Position: Sitting   Cuff Size: Standard   Pulse: 68   Temp: (!) 97 °F (36 1 °C)   SpO2: 97%   Weight: 91 5 kg (201 lb 12 8 oz)   Height: 6' 2" (1 88 m)     Body mass index is 25 91 kg/m²  Physical Exam  Vitals reviewed  Constitutional:       Appearance: He is well-developed  HENT:      Head: Normocephalic  Right Ear: Tympanic membrane and external ear normal       Left Ear: Tympanic membrane and external ear normal       Nose: Nose normal       Mouth/Throat:      Mouth: Mucous membranes are moist       Pharynx: Oropharynx is clear  Eyes:      Extraocular Movements: Extraocular movements intact  Conjunctiva/sclera: Conjunctivae normal       Pupils: Pupils are equal, round, and reactive to light  Neck:      Thyroid: No thyromegaly     Cardiovascular: Rate and Rhythm: Normal rate and regular rhythm  Pulses: Normal pulses  Heart sounds: Normal heart sounds  No murmur heard  Pulmonary:      Effort: Pulmonary effort is normal  No respiratory distress  Breath sounds: Normal breath sounds  No stridor  No wheezing or rhonchi  Abdominal:      General: Bowel sounds are normal  There is no distension  Palpations: Abdomen is soft  There is no mass  Tenderness: There is no abdominal tenderness  There is no right CVA tenderness, left CVA tenderness, guarding or rebound  Hernia: No hernia is present  Musculoskeletal:         General: No swelling  Normal range of motion  Cervical back: Normal range of motion and neck supple  Skin:     General: Skin is warm and dry  Neurological:      General: No focal deficit present  Mental Status: He is alert and oriented to person, place, and time  Deep Tendon Reflexes: Reflexes are normal and symmetric  Psychiatric:         Mood and Affect: Mood normal          Thought Content:  Thought content normal          Judgment: Judgment normal

## 2021-07-28 ENCOUNTER — TELEPHONE (OUTPATIENT)
Dept: GASTROENTEROLOGY | Facility: CLINIC | Age: 56
End: 2021-07-28

## 2021-07-28 NOTE — TELEPHONE ENCOUNTER
Perla Gonzalez patient - Patient completed the test in April (MRI / Lumbar) and one of the suggestions was physical therapy and patient would like to know what he needs to do to get that started and who would give him the script? Please RTRN call to [phone]    Thx

## 2021-07-28 NOTE — TELEPHONE ENCOUNTER
Order placed    [facility]

## 2021-08-06 ENCOUNTER — APPOINTMENT (OUTPATIENT)
Dept: LAB | Facility: HOSPITAL | Age: 56
End: 2021-08-06
Payer: COMMERCIAL

## 2021-08-06 DIAGNOSIS — R10.31 RIGHT LOWER QUADRANT PAIN: ICD-10-CM

## 2021-08-06 DIAGNOSIS — R10.13 EPIGASTRIC PAIN: ICD-10-CM

## 2021-08-06 DIAGNOSIS — R10.9 RIGHT SIDED ABDOMINAL PAIN: ICD-10-CM

## 2021-08-06 LAB
ALBUMIN SERPL BCP-MCNC: 3.6 G/DL (ref 3.5–5)
ALP SERPL-CCNC: 48 U/L (ref 46–116)
ALT SERPL W P-5'-P-CCNC: 33 U/L (ref 12–78)
ANION GAP SERPL CALCULATED.3IONS-SCNC: 10 MMOL/L (ref 4–13)
AST SERPL W P-5'-P-CCNC: 24 U/L (ref 5–45)
BASOPHILS # BLD AUTO: 0.06 THOUSANDS/ΜL (ref 0–0.1)
BASOPHILS NFR BLD AUTO: 1 % (ref 0–1)
BILIRUB SERPL-MCNC: 0.95 MG/DL (ref 0.2–1)
BUN SERPL-MCNC: 11 MG/DL (ref 5–25)
CALCIUM SERPL-MCNC: 8.4 MG/DL (ref 8.3–10.1)
CHLORIDE SERPL-SCNC: 105 MMOL/L (ref 100–108)
CO2 SERPL-SCNC: 26 MMOL/L (ref 21–32)
CREAT SERPL-MCNC: 1 MG/DL (ref 0.6–1.3)
EOSINOPHIL # BLD AUTO: 0.13 THOUSAND/ΜL (ref 0–0.61)
EOSINOPHIL NFR BLD AUTO: 3 % (ref 0–6)
ERYTHROCYTE [DISTWIDTH] IN BLOOD BY AUTOMATED COUNT: 11.7 % (ref 11.6–15.1)
GFR SERPL CREATININE-BSD FRML MDRD: 96 ML/MIN/1.73SQ M
GLUCOSE SERPL-MCNC: 91 MG/DL (ref 65–140)
HCT VFR BLD AUTO: 41.9 % (ref 36.5–49.3)
HGB BLD-MCNC: 14.1 G/DL (ref 12–17)
IMM GRANULOCYTES # BLD AUTO: 0.02 THOUSAND/UL (ref 0–0.2)
IMM GRANULOCYTES NFR BLD AUTO: 0 % (ref 0–2)
LDH SERPL-CCNC: 161 U/L (ref 81–234)
LIPASE SERPL-CCNC: 145 U/L (ref 73–393)
LYMPHOCYTES # BLD AUTO: 3.03 THOUSANDS/ΜL (ref 0.6–4.47)
LYMPHOCYTES NFR BLD AUTO: 61 % (ref 14–44)
MCH RBC QN AUTO: 35.3 PG (ref 26.8–34.3)
MCHC RBC AUTO-ENTMCNC: 33.7 G/DL (ref 31.4–37.4)
MCV RBC AUTO: 105 FL (ref 82–98)
MONOCYTES # BLD AUTO: 0.46 THOUSAND/ΜL (ref 0.17–1.22)
MONOCYTES NFR BLD AUTO: 9 % (ref 4–12)
NEUTROPHILS # BLD AUTO: 1.32 THOUSANDS/ΜL (ref 1.85–7.62)
NEUTS SEG NFR BLD AUTO: 26 % (ref 43–75)
NRBC BLD AUTO-RTO: 0 /100 WBCS
PLATELET # BLD AUTO: 320 THOUSANDS/UL (ref 149–390)
PMV BLD AUTO: 10.2 FL (ref 8.9–12.7)
POTASSIUM SERPL-SCNC: 3.9 MMOL/L (ref 3.5–5.3)
PROT SERPL-MCNC: 6.6 G/DL (ref 6.4–8.2)
RBC # BLD AUTO: 3.99 MILLION/UL (ref 3.88–5.62)
SODIUM SERPL-SCNC: 141 MMOL/L (ref 136–145)
WBC # BLD AUTO: 5.02 THOUSAND/UL (ref 4.31–10.16)

## 2021-08-06 PROCEDURE — 85025 COMPLETE CBC W/AUTO DIFF WBC: CPT

## 2021-08-06 PROCEDURE — 80053 COMPREHEN METABOLIC PANEL: CPT

## 2021-08-06 PROCEDURE — 36415 COLL VENOUS BLD VENIPUNCTURE: CPT

## 2021-08-06 PROCEDURE — 83690 ASSAY OF LIPASE: CPT

## 2021-08-06 PROCEDURE — 83615 LACTATE (LD) (LDH) ENZYME: CPT

## 2021-08-09 ENCOUNTER — APPOINTMENT (OUTPATIENT)
Dept: LAB | Facility: HOSPITAL | Age: 56
End: 2021-08-09
Payer: COMMERCIAL

## 2021-08-09 LAB
BACTERIA UR QL AUTO: NORMAL /HPF
BILIRUB UR QL STRIP: NEGATIVE
CLARITY UR: CLEAR
COLOR UR: YELLOW
GLUCOSE UR STRIP-MCNC: NEGATIVE MG/DL
HGB UR QL STRIP.AUTO: ABNORMAL
KETONES UR STRIP-MCNC: NEGATIVE MG/DL
LEUKOCYTE ESTERASE UR QL STRIP: NEGATIVE
NITRITE UR QL STRIP: NEGATIVE
NON-SQ EPI CELLS URNS QL MICRO: NORMAL /HPF
PH UR STRIP.AUTO: 6 [PH]
PROT UR STRIP-MCNC: NEGATIVE MG/DL
RBC #/AREA URNS AUTO: NORMAL /HPF
SP GR UR STRIP.AUTO: 1.02 (ref 1–1.03)
UROBILINOGEN UR QL STRIP.AUTO: 0.2 E.U./DL
WBC #/AREA URNS AUTO: NORMAL /HPF

## 2021-08-09 PROCEDURE — 81001 URINALYSIS AUTO W/SCOPE: CPT | Performed by: PHYSICIAN ASSISTANT

## 2021-08-10 ENCOUNTER — OFFICE VISIT (OUTPATIENT)
Dept: INTERNAL MEDICINE CLINIC | Facility: CLINIC | Age: 56
End: 2021-08-10
Payer: COMMERCIAL

## 2021-08-10 VITALS
SYSTOLIC BLOOD PRESSURE: 124 MMHG | DIASTOLIC BLOOD PRESSURE: 86 MMHG | TEMPERATURE: 94 F | OXYGEN SATURATION: 96 % | BODY MASS INDEX: 25.49 KG/M2 | HEIGHT: 74 IN | WEIGHT: 198.6 LBS

## 2021-08-10 DIAGNOSIS — R10.30 LOWER ABDOMINAL PAIN: Primary | ICD-10-CM

## 2021-08-10 DIAGNOSIS — J30.1 SEASONAL ALLERGIC RHINITIS DUE TO POLLEN: ICD-10-CM

## 2021-08-10 DIAGNOSIS — Z11.59 NEED FOR HEPATITIS C SCREENING TEST: ICD-10-CM

## 2021-08-10 DIAGNOSIS — K58.2 IRRITABLE BOWEL SYNDROME WITH BOTH CONSTIPATION AND DIARRHEA: ICD-10-CM

## 2021-08-10 DIAGNOSIS — Z11.4 SCREENING FOR HIV (HUMAN IMMUNODEFICIENCY VIRUS): ICD-10-CM

## 2021-08-10 DIAGNOSIS — Z23 ENCOUNTER FOR IMMUNIZATION: ICD-10-CM

## 2021-08-10 PROCEDURE — 99214 OFFICE O/P EST MOD 30 MIN: CPT | Performed by: PHYSICIAN ASSISTANT

## 2021-08-10 PROCEDURE — 3008F BODY MASS INDEX DOCD: CPT | Performed by: PHYSICIAN ASSISTANT

## 2021-08-10 PROCEDURE — 1036F TOBACCO NON-USER: CPT | Performed by: PHYSICIAN ASSISTANT

## 2021-08-10 PROCEDURE — 3725F SCREEN DEPRESSION PERFORMED: CPT | Performed by: PHYSICIAN ASSISTANT

## 2021-08-10 RX ORDER — AMITRIPTYLINE HYDROCHLORIDE 10 MG/1
10 TABLET, FILM COATED ORAL
Qty: 30 TABLET | Refills: 2 | Status: SHIPPED | OUTPATIENT
Start: 2021-08-10 | End: 2022-01-05

## 2021-08-10 NOTE — PROGRESS NOTES
Assessment/Plan:  Abdominal pain  - Mild abdominal pain on palpation of the right mid abdomen as well as bilateral lower abdomen  Discontinue dicyclomine since it did not provide any relief  Start amitriptyline 10 mg daily  Continue fiber supplements  Start a probiotic supplement OTC  Follow-up in 1 month or sooner if needed  No problem-specific Assessment & Plan notes found for this encounter  Diagnoses and all orders for this visit:    Lower abdominal pain    Need for hepatitis C screening test  -     Hepatitis C Antibody ([facility]); Future    Screening for HIV (human immunodeficiency virus)  -     HIV 1/2 Antigen/Antibody (4th Generation) w Reflex [facility]; Future    Encounter for immunization    Seasonal allergic rhinitis due to pollen    Irritable bowel syndrome with both constipation and diarrhea  -     amitriptyline (ELAVIL) 10 mg tablet; Take 1 tablet (10 mg total) by mouth daily at bedtime        Subjective: lower abdominal pain     Patient ID: Jigna Patel is a 60 y o  male  Rogers De Los Santos is a 68-year-old male with a past medical history of seasonal allergic rhinitis, but mitral valve prolapse, gout, presenting today for follow-up for lower abdominal pain  Last month he started on dicyclomine and states this did not provide much relief  He states he takes fiber supplements over-the-counter  His abdominal pain is intermittent and is not exacerbated by any specific foods and it comes and goes throughout the day  It is mainly the lower abdomen and the right mid abdomen  States his bowel movements are irregular, less frequent than his normal  No blood in his stool  No associated nausea or vomiting  Recent colonoscopy showed diverticulosis  Following with GI  Denies CP, SOB, palpitations, fever, chills         The following portions of the patient's history were reviewed and updated as appropriate: allergies, current medications, past family history, past medical history, past social history, past surgical history and problem list     Review of Systems   Constitutional: Negative for chills, fatigue, fever and unexpected weight change  HENT: Negative for ear pain and sore throat  Eyes: Negative for pain and visual disturbance  Respiratory: Negative for cough and shortness of breath  Cardiovascular: Negative for chest pain and palpitations  Gastrointestinal: Positive for abdominal pain (lower abdomin, right mid abdomen) and constipation  Negative for blood in stool, diarrhea, nausea and vomiting  Genitourinary: Negative for dysuria and hematuria  Musculoskeletal: Negative for arthralgias and back pain  Skin: Negative for color change and rash  Neurological: Negative for seizures and syncope  All other systems reviewed and are negative  Objective:      /100   Temp (!) 94 °F (34 4 °C) (Tympanic)   Ht 6' 2" (1 88 m)   Wt 90 1 kg (198 lb 9 6 oz)   SpO2 96%   BMI 25 50 kg/m²          Physical Exam  Vitals reviewed  Constitutional:       General: He is not in acute distress  Appearance: Normal appearance  He is not ill-appearing  HENT:      Head: Normocephalic  Right Ear: External ear normal       Left Ear: External ear normal       Nose: Nose normal       Mouth/Throat:      Mouth: Mucous membranes are moist    Eyes:      General: No scleral icterus  Right eye: No discharge  Left eye: No discharge  Conjunctiva/sclera: Conjunctivae normal    Cardiovascular:      Rate and Rhythm: Normal rate  Pulses: Normal pulses  Heart sounds: No murmur heard  Pulmonary:      Effort: Pulmonary effort is normal  No respiratory distress  Abdominal:      General: Abdomen is flat  There is no distension  Palpations: There is no mass  Tenderness: There is abdominal tenderness (b/l lower abdomin and right mid abdomen)  There is no right CVA tenderness, left CVA tenderness or guarding  Hernia: No hernia is present     Musculoskeletal: General: No swelling or deformity  Normal range of motion  Cervical back: Normal range of motion  No rigidity  Skin:     General: Skin is warm  Coloration: Skin is not jaundiced  Neurological:      Mental Status: He is alert  Psychiatric:         Mood and Affect: Mood normal          Behavior: Behavior normal          Thought Content:  Thought content normal          Judgment: Judgment normal

## 2021-08-13 ENCOUNTER — HOSPITAL ENCOUNTER (OUTPATIENT)
Dept: CT IMAGING | Facility: CLINIC | Age: 56
Discharge: HOME | End: 2021-08-13
Payer: COMMERCIAL

## 2021-08-13 DIAGNOSIS — R10.13 EPIGASTRIC PAIN: ICD-10-CM

## 2021-08-13 DIAGNOSIS — R10.31 RIGHT LOWER QUADRANT PAIN: ICD-10-CM

## 2021-08-13 DIAGNOSIS — R10.9 RIGHT SIDED ABDOMINAL PAIN: ICD-10-CM

## 2021-08-13 PROCEDURE — G1004 CDSM NDSC: HCPCS

## 2021-08-13 PROCEDURE — 74176 CT ABD & PELVIS W/O CONTRAST: CPT

## 2021-08-23 ENCOUNTER — TELEPHONE (OUTPATIENT)
Dept: INTERNAL MEDICINE CLINIC | Facility: CLINIC | Age: 56
End: 2021-08-23

## 2021-08-23 NOTE — TELEPHONE ENCOUNTER
No there was no evidence of infection on his labs and he had a recent colonoscopy so clinically not consistent with colitis

## 2021-08-23 NOTE — TELEPHONE ENCOUNTER
Informed pt of messages, he is also concerned because on my chart he read result about mild colonic wall thickening, asking if he should be concerned

## 2021-08-23 NOTE — TELEPHONE ENCOUNTER
----- Message from Trey Angel DO sent at 8/23/2021  9:08 AM EDT -----  Liver lesion noted on CT scan  Likely benign but needs further work-up with MRI to provide better characterization of this lesion  Small fat containing umbilical hernia  Hernia isn't concerning at this time and would not require surgery in my opinion   But if patient prefers to have hernia repaired, we can refer him to a surgeon

## 2021-09-08 ENCOUNTER — HOSPITAL ENCOUNTER (OUTPATIENT)
Dept: MRI IMAGING | Facility: HOSPITAL | Age: 56
Discharge: HOME | End: 2021-09-08
Attending: INTERNAL MEDICINE
Payer: COMMERCIAL

## 2021-09-08 DIAGNOSIS — K76.9 LIVER LESION: ICD-10-CM

## 2021-09-08 PROCEDURE — A9585 GADOBUTROL INJECTION: HCPCS | Performed by: INTERNAL MEDICINE

## 2021-09-08 PROCEDURE — G1004 CDSM NDSC: HCPCS

## 2021-09-08 PROCEDURE — 74183 MRI ABD W/O CNTR FLWD CNTR: CPT

## 2021-09-08 RX ADMIN — GADOBUTROL 9 ML: 604.72 INJECTION INTRAVENOUS at 06:59

## 2021-09-13 ENCOUNTER — OFFICE VISIT (OUTPATIENT)
Dept: INTERNAL MEDICINE CLINIC | Facility: CLINIC | Age: 56
End: 2021-09-13
Payer: COMMERCIAL

## 2021-09-13 ENCOUNTER — TELEPHONE (OUTPATIENT)
Dept: INTERNAL MEDICINE CLINIC | Facility: CLINIC | Age: 56
End: 2021-09-13

## 2021-09-13 VITALS
OXYGEN SATURATION: 96 % | TEMPERATURE: 98.4 F | HEIGHT: 74 IN | HEART RATE: 51 BPM | DIASTOLIC BLOOD PRESSURE: 78 MMHG | SYSTOLIC BLOOD PRESSURE: 124 MMHG | WEIGHT: 197.6 LBS | BODY MASS INDEX: 25.36 KG/M2

## 2021-09-13 DIAGNOSIS — H10.9 CONJUNCTIVITIS OF LEFT EYE, UNSPECIFIED CONJUNCTIVITIS TYPE: Primary | ICD-10-CM

## 2021-09-13 PROCEDURE — 3725F SCREEN DEPRESSION PERFORMED: CPT | Performed by: INTERNAL MEDICINE

## 2021-09-13 PROCEDURE — 1036F TOBACCO NON-USER: CPT | Performed by: INTERNAL MEDICINE

## 2021-09-13 PROCEDURE — 3008F BODY MASS INDEX DOCD: CPT | Performed by: INTERNAL MEDICINE

## 2021-09-13 PROCEDURE — 99213 OFFICE O/P EST LOW 20 MIN: CPT | Performed by: INTERNAL MEDICINE

## 2021-09-13 RX ORDER — TOBRAMYCIN AND DEXAMETHASONE 3; 1 MG/ML; MG/ML
1 SUSPENSION/ DROPS OPHTHALMIC
Qty: 5 ML | Refills: 0 | Status: SHIPPED | OUTPATIENT
Start: 2021-09-13 | End: 2022-01-05

## 2021-09-13 NOTE — TELEPHONE ENCOUNTER
----- Message from Trey Angel DO sent at 9/13/2021  8:46 AM EDT -----  MRI was normal  There was no liver lesion meaning the finding on CT scan was likely an artifact  There are some very small liver cysts noted which are not of any concern

## 2021-09-13 NOTE — PROGRESS NOTES
[facility]    NAME: Fredi Parent  AGE: 60 y o  SEX: male  : 1965     DATE: 2021     Assessment and Plan:     1  Conjunctivitis of left eye    Avoid touching the eye  Will switch to tobradex solution  - tobramycin-dexamethasone (TOBRADEX) ophthalmic suspension; Administer 1 drop into the left eye every 4 (four) hours while awake  Dispense: 5 mL; Refill: 0     Chief Complaint:     Chief Complaint   Patient presents with    Conjunctivitis     left        History of Present Illness:     Woke up last week on Wednesday with redness, itching of his eye along with increased discharge  States there was pus coming out of his eye  Went to urgent care and was diagnosed with conjunctivitis and given pataday  States it helped a little but he is still having tearing  Feels like there is a film over his eye  Review of Systems:     Review of Systems   Constitutional: Negative for chills, fatigue and fever  Eyes: Positive for discharge, redness and itching  Objective:     /78   Pulse (!) 51   Temp 98 4 °F (36 9 °C)   Ht 6' 2" (1 88 m)   Wt 89 6 kg (197 lb 9 6 oz)   SpO2 96%   BMI 25 37 kg/m²     Physical Exam  Eyes:      General:         Right eye: No discharge  Left eye: Discharge present  Conjunctiva/sclera:      Right eye: Right conjunctiva is not injected  No exudate or hemorrhage  Left eye: Left conjunctiva is injected  No exudate or hemorrhage          Gerry Oreilly DO  [facility] [address] [place] [address]

## 2021-10-19 ENCOUNTER — EVALUATION (OUTPATIENT)
Dept: PHYSICAL THERAPY | Facility: CLINIC | Age: 56
End: 2021-10-19
Payer: COMMERCIAL

## 2021-10-19 DIAGNOSIS — M54.42 CHRONIC BILATERAL LOW BACK PAIN WITH BILATERAL SCIATICA: Primary | ICD-10-CM

## 2021-10-19 DIAGNOSIS — G89.29 CHRONIC BILATERAL LOW BACK PAIN WITH BILATERAL SCIATICA: Primary | ICD-10-CM

## 2021-10-19 DIAGNOSIS — R10.13 EPIGASTRIC PAIN: ICD-10-CM

## 2021-10-19 DIAGNOSIS — M54.41 CHRONIC BILATERAL LOW BACK PAIN WITH BILATERAL SCIATICA: Primary | ICD-10-CM

## 2021-10-19 PROCEDURE — 97161 PT EVAL LOW COMPLEX 20 MIN: CPT

## 2021-10-19 RX ORDER — PANTOPRAZOLE SODIUM 40 MG/1
40 TABLET, DELAYED RELEASE ORAL DAILY
Qty: 90 TABLET | Refills: 0 | Status: SHIPPED | OUTPATIENT
Start: 2021-10-19 | End: 2022-01-16

## 2021-10-25 ENCOUNTER — OFFICE VISIT (OUTPATIENT)
Dept: PHYSICAL THERAPY | Facility: CLINIC | Age: 56
End: 2021-10-25
Payer: COMMERCIAL

## 2021-10-25 DIAGNOSIS — M54.42 CHRONIC BILATERAL LOW BACK PAIN WITH BILATERAL SCIATICA: Primary | ICD-10-CM

## 2021-10-25 DIAGNOSIS — M54.41 CHRONIC BILATERAL LOW BACK PAIN WITH BILATERAL SCIATICA: Primary | ICD-10-CM

## 2021-10-25 DIAGNOSIS — G89.29 CHRONIC BILATERAL LOW BACK PAIN WITH BILATERAL SCIATICA: Primary | ICD-10-CM

## 2021-10-25 PROCEDURE — 97140 MANUAL THERAPY 1/> REGIONS: CPT

## 2021-10-25 PROCEDURE — 97110 THERAPEUTIC EXERCISES: CPT

## 2021-10-28 ENCOUNTER — OFFICE VISIT (OUTPATIENT)
Dept: PHYSICAL THERAPY | Facility: CLINIC | Age: 56
End: 2021-10-28
Payer: COMMERCIAL

## 2021-10-28 DIAGNOSIS — M54.41 CHRONIC BILATERAL LOW BACK PAIN WITH BILATERAL SCIATICA: Primary | ICD-10-CM

## 2021-10-28 DIAGNOSIS — M54.42 CHRONIC BILATERAL LOW BACK PAIN WITH BILATERAL SCIATICA: Primary | ICD-10-CM

## 2021-10-28 DIAGNOSIS — G89.29 CHRONIC BILATERAL LOW BACK PAIN WITH BILATERAL SCIATICA: Primary | ICD-10-CM

## 2021-10-28 PROCEDURE — 97110 THERAPEUTIC EXERCISES: CPT

## 2021-10-28 PROCEDURE — 97140 MANUAL THERAPY 1/> REGIONS: CPT

## 2021-11-01 ENCOUNTER — OFFICE VISIT (OUTPATIENT)
Dept: PHYSICAL THERAPY | Facility: CLINIC | Age: 56
End: 2021-11-01
Payer: COMMERCIAL

## 2021-11-01 DIAGNOSIS — M54.42 CHRONIC BILATERAL LOW BACK PAIN WITH BILATERAL SCIATICA: Primary | ICD-10-CM

## 2021-11-01 DIAGNOSIS — M54.41 CHRONIC BILATERAL LOW BACK PAIN WITH BILATERAL SCIATICA: Primary | ICD-10-CM

## 2021-11-01 DIAGNOSIS — G89.29 CHRONIC BILATERAL LOW BACK PAIN WITH BILATERAL SCIATICA: Primary | ICD-10-CM

## 2021-11-01 PROCEDURE — 97110 THERAPEUTIC EXERCISES: CPT

## 2021-11-01 PROCEDURE — 97112 NEUROMUSCULAR REEDUCATION: CPT

## 2021-11-04 ENCOUNTER — OFFICE VISIT (OUTPATIENT)
Dept: PHYSICAL THERAPY | Facility: CLINIC | Age: 56
End: 2021-11-04
Payer: COMMERCIAL

## 2021-11-04 DIAGNOSIS — G89.29 CHRONIC BILATERAL LOW BACK PAIN WITH BILATERAL SCIATICA: Primary | ICD-10-CM

## 2021-11-04 DIAGNOSIS — M54.41 CHRONIC BILATERAL LOW BACK PAIN WITH BILATERAL SCIATICA: Primary | ICD-10-CM

## 2021-11-04 DIAGNOSIS — M54.42 CHRONIC BILATERAL LOW BACK PAIN WITH BILATERAL SCIATICA: Primary | ICD-10-CM

## 2021-11-04 PROCEDURE — 97110 THERAPEUTIC EXERCISES: CPT

## 2021-11-04 PROCEDURE — 97112 NEUROMUSCULAR REEDUCATION: CPT

## 2021-11-08 ENCOUNTER — OFFICE VISIT (OUTPATIENT)
Dept: PHYSICAL THERAPY | Facility: CLINIC | Age: 56
End: 2021-11-08
Payer: COMMERCIAL

## 2021-11-08 DIAGNOSIS — G89.29 CHRONIC BILATERAL LOW BACK PAIN WITH BILATERAL SCIATICA: Primary | ICD-10-CM

## 2021-11-08 DIAGNOSIS — M54.41 CHRONIC BILATERAL LOW BACK PAIN WITH BILATERAL SCIATICA: Primary | ICD-10-CM

## 2021-11-08 DIAGNOSIS — M54.42 CHRONIC BILATERAL LOW BACK PAIN WITH BILATERAL SCIATICA: Primary | ICD-10-CM

## 2021-11-08 PROCEDURE — 97110 THERAPEUTIC EXERCISES: CPT

## 2021-11-08 PROCEDURE — 97140 MANUAL THERAPY 1/> REGIONS: CPT

## 2021-11-11 ENCOUNTER — APPOINTMENT (OUTPATIENT)
Dept: PHYSICAL THERAPY | Facility: CLINIC | Age: 56
End: 2021-11-11
Payer: COMMERCIAL

## 2021-11-15 ENCOUNTER — OFFICE VISIT (OUTPATIENT)
Dept: PHYSICAL THERAPY | Facility: CLINIC | Age: 56
End: 2021-11-15
Payer: COMMERCIAL

## 2021-11-15 DIAGNOSIS — M54.42 CHRONIC BILATERAL LOW BACK PAIN WITH BILATERAL SCIATICA: Primary | ICD-10-CM

## 2021-11-15 DIAGNOSIS — M54.41 CHRONIC BILATERAL LOW BACK PAIN WITH BILATERAL SCIATICA: Primary | ICD-10-CM

## 2021-11-15 DIAGNOSIS — G89.29 CHRONIC BILATERAL LOW BACK PAIN WITH BILATERAL SCIATICA: Primary | ICD-10-CM

## 2021-11-15 PROCEDURE — 97112 NEUROMUSCULAR REEDUCATION: CPT

## 2021-11-15 PROCEDURE — 97140 MANUAL THERAPY 1/> REGIONS: CPT

## 2021-11-15 PROCEDURE — 97110 THERAPEUTIC EXERCISES: CPT

## 2021-11-18 ENCOUNTER — EVALUATION (OUTPATIENT)
Dept: PHYSICAL THERAPY | Facility: CLINIC | Age: 56
End: 2021-11-18
Payer: COMMERCIAL

## 2021-11-18 DIAGNOSIS — G89.29 CHRONIC BILATERAL LOW BACK PAIN WITH BILATERAL SCIATICA: Primary | ICD-10-CM

## 2021-11-18 DIAGNOSIS — M54.42 CHRONIC BILATERAL LOW BACK PAIN WITH BILATERAL SCIATICA: Primary | ICD-10-CM

## 2021-11-18 DIAGNOSIS — M54.41 CHRONIC BILATERAL LOW BACK PAIN WITH BILATERAL SCIATICA: Primary | ICD-10-CM

## 2021-11-18 PROCEDURE — 97110 THERAPEUTIC EXERCISES: CPT

## 2021-11-22 ENCOUNTER — OFFICE VISIT (OUTPATIENT)
Dept: PHYSICAL THERAPY | Facility: CLINIC | Age: 56
End: 2021-11-22
Payer: COMMERCIAL

## 2021-11-22 DIAGNOSIS — M54.42 CHRONIC BILATERAL LOW BACK PAIN WITH BILATERAL SCIATICA: Primary | ICD-10-CM

## 2021-11-22 DIAGNOSIS — M54.41 CHRONIC BILATERAL LOW BACK PAIN WITH BILATERAL SCIATICA: Primary | ICD-10-CM

## 2021-11-22 DIAGNOSIS — G89.29 CHRONIC BILATERAL LOW BACK PAIN WITH BILATERAL SCIATICA: Primary | ICD-10-CM

## 2021-11-22 PROCEDURE — 97140 MANUAL THERAPY 1/> REGIONS: CPT

## 2021-11-22 PROCEDURE — 97112 NEUROMUSCULAR REEDUCATION: CPT

## 2021-11-23 ENCOUNTER — APPOINTMENT (OUTPATIENT)
Dept: PHYSICAL THERAPY | Facility: CLINIC | Age: 56
End: 2021-11-23
Payer: COMMERCIAL

## 2021-11-30 ENCOUNTER — OFFICE VISIT (OUTPATIENT)
Dept: PHYSICAL THERAPY | Facility: CLINIC | Age: 56
End: 2021-11-30
Payer: COMMERCIAL

## 2021-11-30 DIAGNOSIS — M54.42 CHRONIC BILATERAL LOW BACK PAIN WITH BILATERAL SCIATICA: Primary | ICD-10-CM

## 2021-11-30 DIAGNOSIS — G89.29 CHRONIC BILATERAL LOW BACK PAIN WITH BILATERAL SCIATICA: Primary | ICD-10-CM

## 2021-11-30 DIAGNOSIS — M54.41 CHRONIC BILATERAL LOW BACK PAIN WITH BILATERAL SCIATICA: Primary | ICD-10-CM

## 2021-11-30 PROCEDURE — 97140 MANUAL THERAPY 1/> REGIONS: CPT

## 2021-11-30 PROCEDURE — 97112 NEUROMUSCULAR REEDUCATION: CPT

## 2021-11-30 PROCEDURE — 97110 THERAPEUTIC EXERCISES: CPT

## 2021-12-02 ENCOUNTER — OFFICE VISIT (OUTPATIENT)
Dept: PHYSICAL THERAPY | Facility: CLINIC | Age: 56
End: 2021-12-02
Payer: COMMERCIAL

## 2021-12-02 DIAGNOSIS — G89.29 CHRONIC BILATERAL LOW BACK PAIN WITH BILATERAL SCIATICA: Primary | ICD-10-CM

## 2021-12-02 DIAGNOSIS — M54.41 CHRONIC BILATERAL LOW BACK PAIN WITH BILATERAL SCIATICA: Primary | ICD-10-CM

## 2021-12-02 DIAGNOSIS — M54.42 CHRONIC BILATERAL LOW BACK PAIN WITH BILATERAL SCIATICA: Primary | ICD-10-CM

## 2021-12-02 PROCEDURE — 97110 THERAPEUTIC EXERCISES: CPT

## 2021-12-02 PROCEDURE — 97140 MANUAL THERAPY 1/> REGIONS: CPT

## 2021-12-02 PROCEDURE — 97112 NEUROMUSCULAR REEDUCATION: CPT

## 2021-12-07 ENCOUNTER — OFFICE VISIT (OUTPATIENT)
Dept: PHYSICAL THERAPY | Facility: CLINIC | Age: 56
End: 2021-12-07
Payer: COMMERCIAL

## 2021-12-07 DIAGNOSIS — G89.29 CHRONIC BILATERAL LOW BACK PAIN WITH BILATERAL SCIATICA: Primary | ICD-10-CM

## 2021-12-07 DIAGNOSIS — M54.42 CHRONIC BILATERAL LOW BACK PAIN WITH BILATERAL SCIATICA: Primary | ICD-10-CM

## 2021-12-07 DIAGNOSIS — M54.41 CHRONIC BILATERAL LOW BACK PAIN WITH BILATERAL SCIATICA: Primary | ICD-10-CM

## 2021-12-07 PROCEDURE — 97112 NEUROMUSCULAR REEDUCATION: CPT

## 2021-12-07 PROCEDURE — 97110 THERAPEUTIC EXERCISES: CPT

## 2021-12-09 ENCOUNTER — OFFICE VISIT (OUTPATIENT)
Dept: PHYSICAL THERAPY | Facility: CLINIC | Age: 56
End: 2021-12-09
Payer: COMMERCIAL

## 2021-12-09 DIAGNOSIS — M54.42 CHRONIC BILATERAL LOW BACK PAIN WITH BILATERAL SCIATICA: Primary | ICD-10-CM

## 2021-12-09 DIAGNOSIS — M54.41 CHRONIC BILATERAL LOW BACK PAIN WITH BILATERAL SCIATICA: Primary | ICD-10-CM

## 2021-12-09 DIAGNOSIS — G89.29 CHRONIC BILATERAL LOW BACK PAIN WITH BILATERAL SCIATICA: Primary | ICD-10-CM

## 2021-12-09 PROCEDURE — 97112 NEUROMUSCULAR REEDUCATION: CPT

## 2021-12-14 ENCOUNTER — APPOINTMENT (OUTPATIENT)
Dept: PHYSICAL THERAPY | Facility: CLINIC | Age: 56
End: 2021-12-14
Payer: COMMERCIAL

## 2021-12-14 NOTE — PROGRESS NOTES
Daily Note     Today's date: 2021  Patient name: Negrito Newell  : 1965  MRN: [id number]  Referring provider: Radha Murphy, *  Dx:   Encounter Diagnosis     ICD-10-CM    1  Chronic bilateral low back pain with bilateral sciatica  M54 42     M54 41     G89 29                   Subjective: ***      Objective: See treatment diary below      Assessment: Tolerated treatment {Tolerated treatment :[id number]}   Patient {assessment:}      Plan: {PLAN:1823330880}     Precautions: impaired fasting glucose, mitral valve prolapse, minimal grade 1 anterolisthesis of L4 on L5 via MRI    DATES:  12/2 12/7 12/9 11/1 11/4 11/8 11/15 11/18 11/22 11/30   Manuals             pball traction     TB TB TB  TB      L posterior MET + hip add + hip abd iso 5x5s, 5x5s, 5x5s       5x5s, 5x5s, 5x5s 5x5s, 5x5s, 5x5s    R posterior MET + hip add + hip abd iso          5x5s, 5x5s, 5x5s                Neuro Re-Ed             Paloff press    8lbs cruzito 2x10 b/l 8lbs cruzito 2x10 b/l stir the pot   10lbs cruzito 2x10 b/l stir the pot       Planks    20 sec x3  20 sec x3        Dead bugs      2x10 b/l no pball    2x10 b/l   Bird dogs  15x b/l 20x b/l      x10 b/l w/dowel     Side plank  + clamshell 15x b/l 20x b/l 20 sec x 2 ea b/l  20 sec x2 ea b/l   3x 20s b/l  NV    Squat and row   25x    20x w/ core brace  20x w/core brace    Bear crawl    Sustained bear hold 8x 15s    3 laps at bar  x10 fwd/back on low mat table    Core brace + BKFO 15x b/l green 20x b/l green 20x b/l       15x b/l green   Fire hydrants  2x10 b/l 2x10 b/l 20x b/l       2x10 b/l   Sustained clamshell level 2 2x10 b/l 2x10 b/l 2x10 b/l       2x10 b/l   Hip abd kicks in flex/ext  2x10 b/l 2x10          SL leg press   20x b/l opposite leg elevated                       Ther Ex             Pt edu  assessment of SI/hip/pelvis - symmetrical with level ASIS and medial mallelous assessment of SI/hip/pelvis - symmetrical with level ASIS and medial mallelous, negative supine to sit test   FOTO, current difficulties  assessment, progress, explanation of MET and innominate rotation, HEP of self MET 2x a day as done in clinic  HEP - stop performing METs at this time   Bike 8 min 8 min 6 min 5 min  6 min  6 min  6 min 8 min 6 min  6 min   SKTC    10"x10 b/l  10"x10 b/l  10" x10 b/l  10x 10s b/l 10x 10s b/l 10s x10 ea b/l     Piriformis stretch             LTR 20x b/l   20x b/l  20x b/l  20x b/l  20x b/l 20x b/l x20 b/l  20x b/l   Lat band walks    2 laps ytb 3 laps ytb   3 laps red      Standing hip 3 way   abd and ext 2x10 each b/l red 20x ea b/l ytb 20x ea b/l ytb  20x ea dir b/l ytb 20x each direction b/l red      Bridges / Uni bridges  15x b/l 20x b/l 2x10 25x 20x DL, 20x uni bridge    20x    Hip external rotation stretch  10x 10s b/l   10x 10s b/l  10x 10s 10x 10s b/l   10x 10s b/l  10x 10s b/l  10x 10s b/l   Clamshell                          Ther Activity                                       Gait Training                                       Modalities                          HEP: [id number]

## 2021-12-16 ENCOUNTER — OFFICE VISIT (OUTPATIENT)
Dept: PHYSICAL THERAPY | Facility: CLINIC | Age: 56
End: 2021-12-16
Payer: COMMERCIAL

## 2021-12-16 DIAGNOSIS — G89.29 CHRONIC BILATERAL LOW BACK PAIN WITH BILATERAL SCIATICA: Primary | ICD-10-CM

## 2021-12-16 DIAGNOSIS — M54.42 CHRONIC BILATERAL LOW BACK PAIN WITH BILATERAL SCIATICA: Primary | ICD-10-CM

## 2021-12-16 DIAGNOSIS — M54.41 CHRONIC BILATERAL LOW BACK PAIN WITH BILATERAL SCIATICA: Primary | ICD-10-CM

## 2021-12-16 PROCEDURE — 97140 MANUAL THERAPY 1/> REGIONS: CPT

## 2021-12-16 PROCEDURE — 97112 NEUROMUSCULAR REEDUCATION: CPT

## 2021-12-21 ENCOUNTER — TELEPHONE (OUTPATIENT)
Dept: PHYSICAL THERAPY | Facility: CLINIC | Age: 56
End: 2021-12-21

## 2021-12-21 ENCOUNTER — APPOINTMENT (OUTPATIENT)
Dept: PHYSICAL THERAPY | Facility: CLINIC | Age: 56
End: 2021-12-21
Payer: COMMERCIAL

## 2021-12-21 NOTE — PROGRESS NOTES
PT Re-Evaluation     Today's date: 2021  Patient name: Rudy Elizalde  : 1965  MRN: [id number]  Referring provider: Enrique Marquez, *  Dx:   Encounter Diagnosis     ICD-10-CM    1  Chronic bilateral low back pain with bilateral sciatica  M54 42     M54 41     G89 29                   Assessment  Assessment details: Kenan Pi has made good progress in skilled PT to date noting a 70% improvement in his overall condition with less consistent pain  Pt noting no change and continued difficulties with prolonged sitting in which he gets pain in the groin and the back  Objectively pt demonstrating improved lumbar mobility in flex and ext with no pain  Pt demonstrating L innominate anterior rotation with positive L supine to sit testing  Symmetrical innominate and leg length following L posterior innominate MET  Noting less pain with ambulation following manuals  Educated pt regarding innominate anatomy and use of MET  Educated pt to perform at home as done in clinic for HEP for carryover affects  Pt continues to display pain symptoms and display newly discovered asymmetric is pelvic alignment which could be contributing to his overall presentation  Pt's ongoing pain likely affects his ADL performance and prolonged posturing tasks including sitting  Pt will benefit from continued PT at this time to address remaining deficits for improved function  Impairments: activity intolerance, impaired physical strength, lacks appropriate home exercise program and pain with function    Goals  STG: to be achieved by 4 weeks  Pt will demonstrate independence with provided HEP  Pt will demonstrate decreased pain to no greater than 2/10  - PROGRESSING  Pt will improve bilateral hip abd strength to at least 4+/5 (TBA)  LTG: to be achieved by discharge  Pt will improve FOTO score to at least 75  - MET  Pt will be able to perform at least 90 min of sitting without increased pain   - CONTINUED PAIN  Pt will demonstrate WNL lumbar ROM in all planes  - MET  Pt will report no difficulty with all ADLs  Plan  Patient would benefit from: PT eval and skilled physical therapy  Planned modality interventions: cryotherapy and thermotherapy: hydrocollator packs  Planned therapy interventions: joint mobilization, manual therapy, massage, neuromuscular re-education, patient education, postural training, strengthening, stretching, therapeutic activities, therapeutic exercise, functional ROM exercises, flexibility and home exercise program  Frequency: 2x week  Duration in weeks: 6        Subjective Evaluation    History of Present Illness  Mechanism of injury: % Improvement in Condition: 70%  Improvements: less consistent pain  Continued Difficulties: prolonged sitting - pain in groin and back  Goals and Continued PT: continue to work on things    Pt reporting not doing well today  Notes increase burning around his umbilical region  Believes the hip pain has been improved with PT  States that he wants to continue PT   Pt denies burning, ripping, or tearing in his abdomen region  Pain  Current pain ratin  At worst pain ratin          Objective     Tenderness     Additional Tenderness Details  No TTP to b/l glute, paraspinals, b/l psoas, or hip flexor region    Neurological Testing     Reflexes   Left   Patellar (L4): normal (2+)  Achilles (S1): absent (0)  Clonus sign: negative    Right   Patellar (L4): normal (2+)  Achilles (S1): normal (2+)    Additional Neurological Details  LE dermatomes intact    LE myotomes intact    Active Range of Motion     Additional Active Range of Motion Details  Flex: to floor, decreased lumbar pain  Ext: 22 deg, pull in groin region, reintroduces lumbar pain  R SB: above jt line  L SB: above jt line    :  Flex: to floor, no pain  Ext: 25 deg, no pain    Passive Range of Motion     Additional Passive Range of Motion Details  R min quad restriction compared to L - reproduced pain    No HS restriction b/l    No hip flexor restriction b/l    Joint Play   Joints within functional limits: L1, L2, L3, L4 and L5     Pain: L4 and L5   L1 comments: cPA  L2 comments: cPA  L3 comments: cPA  L4 comments: cPA  L5 comments: cPA  Mechanical Assessment    Cervical      Thoracic      Lumbar    Lying flexion: repeated movements  Pain location: no change  Pain intensity: better    Tests     Lumbar     Left   Negative passive SLR  Right   Negative passive SLR  Left Hip   Negative MIQUEL and FADIR (reduction in ant hip symptoms)  Right Hip   Negative MIQUEL and FADIR (reduction in ant hip symptoms)       Additional Tests Details  Negative femoral nerve tension testing b/l    Negative scour test b/l    Pain reproduced with sacral PA     Negative posterior thigh thrust b/l    General Comments:      Lumbar Comments  11/18:   L anterior innominate anterior rotation in supine, L positive supine to sit test, symmetrical ASIS and medial malleolus after L posterior MET             Precautions: impaired fasting glucose, mitral valve prolapse, minimal grade 1 anterolisthesis of L4 on L5 via MRI         DATES:  12/2 12/7 12/9 12/16   11/15 11/18 11/22 11/30   Manuals             pball traction     TB   TB      L posterior MET + hip add + hip abd iso 5x5s, 5x5s, 5x5s       5x5s, 5x5s, 5x5s 5x5s, 5x5s, 5x5s    R posterior MET + hip add + hip abd iso          5x5s, 5x5s, 5x5s                Neuro Re-Ed             Paloff press       10lbs cruzito 2x10 b/l stir the pot       Planks             Dead bugs          2x10 b/l   Bird dogs  15x b/l 20x b/l 20x b/l      x10 b/l w/dowel     Side plank  + clamshell 15x b/l 20x b/l    3x 20s b/l  NV    Squat and row   25x    20x w/ core brace  20x w/core brace    Bear crawl    Sustained bear hold 8x 15s    3 laps at bar  x10 fwd/back on low mat table    Core brace + BKFO 15x b/l green 20x b/l green 20x b/l       15x b/l green   Fire hydrants  2x10 b/l 2x10 b/l 20x b/l 20x b/l       2x10 b/l Sustained clamshell level 2 2x10 b/l 2x10 b/l 2x10 b/l 20x b/l       2x10 b/l   Hip abd kicks in flex/ext  2x10 b/l 2x10 2x10 b/l sidelying         SL leg press   20x b/l opposite leg elevated 20x b/l opposite leg elevated                      Ther Ex             Pt edu  assessment of SI/hip/pelvis - symmetrical with level ASIS and medial mallelous assessment of SI/hip/pelvis - symmetrical with level ASIS and medial mallelous, negative supine to sit test measurement of SI/hip/pelvis-symmetrical w/lvl ASIS and medial mallelous negative supine to sit test    assessment, progress, explanation of MET and innominate rotation, HEP of self MET 2x a day as done in clinic  HEP - stop performing METs at this time   Bike 8 min 8 min 6 min 6 min    6 min 8 min 6 min  6 min   SKTC    10s x10 b/l    10x 10s b/l 10x 10s b/l 10s x10 ea b/l     Piriformis stretch             LTR 20x b/l      20x b/l 20x b/l x20 b/l  20x b/l   Lat band walks       3 laps red      Standing hip 3 way   abd and ext 2x10 each b/l red    20x each direction b/l red      Bridges / Uni bridges  15x b/l 20x b/l       20x    Hip external rotation stretch  10x 10s b/l   30"x3 ea b/l     10x 10s b/l  10x 10s b/l  10x 10s b/l   Clamshell                          Ther Activity                                       Gait Training                                       Modalities                          HEP: [id number]

## 2021-12-22 ENCOUNTER — EVALUATION (OUTPATIENT)
Dept: PHYSICAL THERAPY | Facility: CLINIC | Age: 56
End: 2021-12-22
Payer: COMMERCIAL

## 2021-12-22 DIAGNOSIS — G89.29 CHRONIC BILATERAL LOW BACK PAIN WITH BILATERAL SCIATICA: Primary | ICD-10-CM

## 2021-12-22 DIAGNOSIS — M54.42 CHRONIC BILATERAL LOW BACK PAIN WITH BILATERAL SCIATICA: Primary | ICD-10-CM

## 2021-12-22 DIAGNOSIS — M54.41 CHRONIC BILATERAL LOW BACK PAIN WITH BILATERAL SCIATICA: Primary | ICD-10-CM

## 2021-12-22 PROCEDURE — 97110 THERAPEUTIC EXERCISES: CPT

## 2021-12-22 PROCEDURE — 97140 MANUAL THERAPY 1/> REGIONS: CPT

## 2021-12-22 PROCEDURE — 97112 NEUROMUSCULAR REEDUCATION: CPT

## 2022-01-05 ENCOUNTER — OFFICE VISIT (OUTPATIENT)
Dept: GASTROENTEROLOGY | Facility: CLINIC | Age: 57
End: 2022-01-05
Payer: COMMERCIAL

## 2022-01-05 VITALS
DIASTOLIC BLOOD PRESSURE: 90 MMHG | HEIGHT: 62 IN | SYSTOLIC BLOOD PRESSURE: 124 MMHG | WEIGHT: 202.4 LBS | BODY MASS INDEX: 37.25 KG/M2

## 2022-01-05 DIAGNOSIS — R10.33 PERIUMBILICAL ABDOMINAL PAIN: Primary | ICD-10-CM

## 2022-01-05 PROCEDURE — 99213 OFFICE O/P EST LOW 20 MIN: CPT | Performed by: PHYSICIAN ASSISTANT

## 2022-01-05 PROCEDURE — 3008F BODY MASS INDEX DOCD: CPT | Performed by: PHYSICIAN ASSISTANT

## 2022-01-05 PROCEDURE — 1036F TOBACCO NON-USER: CPT | Performed by: PHYSICIAN ASSISTANT

## 2022-01-05 NOTE — PROGRESS NOTES
[facility] - Outpatient Follow-up Note  Jayant Living 60 y o  male MRN: [id number]  Encounter: [id number]          ASSESSMENT AND PLAN:      1  Periumbilical abdominal pain  He reports discomfort to the right of his umbilicus without any other symptoms  EGD, Colonoscopy, CT, MRI, US all have been unrevealing as to a source  He does have a very small umbilical hernia  Agreed to trial of probiotic  If worsening consider surgical evaluation    ______________________________________________________________________    SUBJECTIVE:  49-year-old male presents for evaluation right-sided abdominal pain  He was evaluated last year with symptoms of lower back pain, groin pain, hip with pain radiating down his legs  He was sent for a colonoscopy as he was due for this  This showed ascending diverticula but was otherwise normal   He then had a lumbar MRI performed which showed mild multilevel spondylosis with some left-sided foraminal stenosis and right L4-5 facet arthropathy  He is now receiving physical therapy and does believe it was helping  He reports that his right-sided discomfort is not severe but is annoying  There are no other associated symptoms such as gaseousness, bloating, diarrhea, constipation, abdominal cramping, nausea, vomiting  He reports the only thing that seems to intensify the symptom is bending over  He is still working out and reports that when he works out he feels his best   After worse during his recovery phase is when he feels the symptom most intensely  REVIEW OF SYSTEMS IS OTHERWISE NEGATIVE        Historical Information   Past Medical History:   Diagnosis Date    Gout     Hypertension     resolved    Mitral valve prolapse     Neutropenia (HCC)     Thrombocytosis     last assessed: 6/14/2016     Past Surgical History:   Procedure Laterality Date    COLONOSCOPY  2021    HAND SURGERY      ORTHOPEDIC SURGERY Left     hand fracture repaired    RI CYSTO/URETERO W/LITHOTRIPSY &INDWELL STENT INSRT Right 2016    Procedure: CYSTOSCOPY; URETEROSCOPY;  RETROGRADE PYELOGRAM; STENT ;  Surgeon: Chiquita Greenfield MD;  [facility];  Service: Urology    SHOULDER ARTHROSCOPY W/ LABRAL REPAIR      SHOULDER SURGERY       Social History   Social History     Substance and Sexual Activity   Alcohol Use Yes    Alcohol/week: 6 0 standard drinks    Types: 6 Cans of beer per week    Comment: week; social (as per allscripts)     Social History     Substance and Sexual Activity   Drug Use No     Social History     Tobacco Use   Smoking Status Former Smoker    Packs/day: 0 03    Years: 3 00    Pack years: 0 09    Types: Cigarettes    Quit date: 1999    Years since quittin 7   Smokeless Tobacco Never Used     Family History   Problem Relation Age of Onset    Stomach cancer Father     Hypertension Father         benign essential    Diverticulosis Father     Breast cancer Sister     Diabetes Maternal Grandmother         mellitus    Ovarian cancer Paternal Grandmother     Ovarian cancer Child     Breast cancer Maternal Aunt     Lung cancer Paternal Uncle     Pancreatic cancer Paternal Uncle     Lung cancer Cousin        Meds/Allergies       Current Outpatient Medications:     allopurinol (ZYLOPRIM) 300 mg tablet    pantoprazole (PROTONIX) 40 mg tablet    No Known Allergies        Objective     Blood pressure 124/90, height 5' 2" (1 575 m), weight 91 8 kg (202 lb 6 4 oz)  Body mass index is 37 02 kg/m²  PHYSICAL EXAM:      General Appearance:   Alert, cooperative, no distress   HEENT:   Normocephalic, atraumatic, anicteric      Neck:  Supple, symmetrical, trachea midline   Lungs:   Clear to auscultation bilaterally; no rales, rhonchi or wheezing; respirations unlabored    Heart[de-identified]   Regular rate and rhythm; no murmur, rub, or gallop     Abdomen:   Soft, non-tender, non-distended; normal bowel sounds; no masses, no organomegaly  Genitalia:   Deferred    Rectal:   Deferred    Extremities:  No cyanosis, clubbing or edema    Pulses:  2+ and symmetric    Skin:  No jaundice, rashes, or lesions    Lymph nodes:  No palpable cervical lymphadenopathy        Lab Results:   No visits with results within 1 Day(s) from this visit  Latest known visit with results is:   Appointment on 08/06/2021   Component Date Value    Sodium 08/06/2021 141     Potassium 08/06/2021 3 9     Chloride 08/06/2021 105     CO2 08/06/2021 26     ANION GAP 08/06/2021 10     BUN 08/06/2021 11     Creatinine 08/06/2021 1 00     Glucose 08/06/2021 91     Calcium 08/06/2021 8 4     AST 08/06/2021 24     ALT 08/06/2021 33     Alkaline Phosphatase 08/06/2021 48     Total Protein 08/06/2021 6 6     Albumin 08/06/2021 3 6     Total Bilirubin 08/06/2021 0 95     eGFR 08/06/2021 96     WBC 08/06/2021 5 02     RBC 08/06/2021 3 99     Hemoglobin 08/06/2021 14 1     Hematocrit 08/06/2021 41 9     MCV 08/06/2021 105*    MCH 08/06/2021 35 3*    MCHC 08/06/2021 33 7     RDW 08/06/2021 11 7     MPV 08/06/2021 10 2     Platelets 41/52/2093 320     nRBC 08/06/2021 0     Neutrophils Relative 08/06/2021 26*    Immat GRANS % 08/06/2021 0     Lymphocytes Relative 08/06/2021 61*    Monocytes Relative 08/06/2021 9     Eosinophils Relative 08/06/2021 3     Basophils Relative 08/06/2021 1     Neutrophils Absolute 08/06/2021 1 32*    Immature Grans Absolute 08/06/2021 0 02     Lymphocytes Absolute 08/06/2021 3 03     Monocytes Absolute 08/06/2021 0 46     Eosinophils Absolute 08/06/2021 0 13     Basophils Absolute 08/06/2021 0 06     Lipase 08/06/2021 145     LD 08/06/2021 161          Radiology Results:   No results found

## 2022-01-16 DIAGNOSIS — R10.13 EPIGASTRIC PAIN: ICD-10-CM

## 2022-01-16 RX ORDER — PANTOPRAZOLE SODIUM 40 MG/1
TABLET, DELAYED RELEASE ORAL
Qty: 90 TABLET | Refills: 0 | Status: SHIPPED | OUTPATIENT
Start: 2022-01-16

## 2022-02-16 ENCOUNTER — TELEPHONE (OUTPATIENT)
Dept: UROLOGY | Facility: AMBULATORY SURGERY CENTER | Age: 57
End: 2022-02-16

## 2022-02-16 ENCOUNTER — APPOINTMENT (OUTPATIENT)
Dept: LAB | Facility: HOSPITAL | Age: 57
End: 2022-02-16
Payer: COMMERCIAL

## 2022-02-16 DIAGNOSIS — Z12.5 SCREENING FOR PROSTATE CANCER: ICD-10-CM

## 2022-02-16 LAB — PSA SERPL-MCNC: 0.4 NG/ML (ref 0–4)

## 2022-02-16 PROCEDURE — G0103 PSA SCREENING: HCPCS

## 2022-02-16 PROCEDURE — 36415 COLL VENOUS BLD VENIPUNCTURE: CPT

## 2022-02-16 NOTE — TELEPHONE ENCOUNTER
Patient called to say he needs to have a script for a CT Scan for his follow up visit  Said he received post card but there is no script in chart to schedule  Please advise patient when done so he can schedule

## 2022-02-16 NOTE — TELEPHONE ENCOUNTER
Last imaging 8/2021 was negative for stones  Pain may be MSK in nature per his history  Can assess symptoms at upcoming appt and determine if further imaging is warranted

## 2022-02-16 NOTE — TELEPHONE ENCOUNTER
Called and left message for return call, number provided  When patient returns call can advise no further imaging prior to appt  Follow up as scheduled and this can further be discussed with provider

## 2022-02-16 NOTE — TELEPHONE ENCOUNTER
Patient was last seen 2/23/21 by Dr Monica Winston for prostate cancer screening, history of kidney stones  At time of last visit, Dr Monica Winston had ordered CT and PSA to be done 2 weeks from time of appointment  Also ordered repeat PSA in 1 year with follow up appointment  CT and PSA were completed at that time last year  CT from 3/3/21 was negative for stones  Returned call to patient, advised that prior recommendations was to have CT done 2 weeks following prior appointment, no CT recommended for his annual follow up  Since patient had a CT and MRI done in August and September of last year for abdominal pain  Patient reports he was referred to a GI specialist at that time  Both of these scans negative for stone  Patient reports he feels he needs additional imaging at this time  Reports right sided flank pain, that goes from below the rib cage and sometimes radiates down towards groin  This patient is described as intermittent  Patient also reports constant "groin discomfort " Patient reports these symptoms started "months ago " Questioned patient if they symptoms presented themselves after imaging was completed last fall  Patient voiced they did start since those scans were completed  Patient denies nausea, vomiting or fever  Advised, will discuss with provider if they feel CT warranted or possibly another type of imaging  Patient had pending annual appointment scheduled for next week on 2/22  PSA was completed this morning

## 2022-02-22 ENCOUNTER — OFFICE VISIT (OUTPATIENT)
Dept: UROLOGY | Facility: CLINIC | Age: 57
End: 2022-02-22
Payer: COMMERCIAL

## 2022-02-22 VITALS
HEART RATE: 82 BPM | WEIGHT: 203 LBS | BODY MASS INDEX: 37.36 KG/M2 | DIASTOLIC BLOOD PRESSURE: 72 MMHG | OXYGEN SATURATION: 98 % | SYSTOLIC BLOOD PRESSURE: 120 MMHG | HEIGHT: 62 IN

## 2022-02-22 DIAGNOSIS — Z12.5 SCREENING FOR PROSTATE CANCER: ICD-10-CM

## 2022-02-22 DIAGNOSIS — R10.31 RIGHT INGUINAL PAIN: Primary | ICD-10-CM

## 2022-02-22 PROCEDURE — 3008F BODY MASS INDEX DOCD: CPT | Performed by: PHYSICIAN ASSISTANT

## 2022-02-22 PROCEDURE — 99213 OFFICE O/P EST LOW 20 MIN: CPT | Performed by: PHYSICIAN ASSISTANT

## 2022-02-22 PROCEDURE — 1036F TOBACCO NON-USER: CPT | Performed by: PHYSICIAN ASSISTANT

## 2022-02-22 NOTE — PROGRESS NOTES
2/22/2022      Chief Complaint   Patient presents with    Prostate cancer screening    Nephrolithiasis    Right Sided Groin Pain         Assessment and Plan    60 y o  male -- Dr Kevin Mnuoz    1  Prostate cancer screening  - most recent PSA 0 4, stable  - VON today benign  - follow-up 1 year PSA prior to visit    2  History of nephrolithiasis  - asymptomatic at this time  - Will call with any symptoms    3  Groin pain  - US ordered  - Will call with results      History of Present Illness  Liliana Vaughan is a 60 y o  male patient of Dr Kevin Munoz with history of nephrolithiasis here for follow up  Most recent PSA 0 4, stable  Denies any new or worsening symptoms today  Denies gross hematuria, dysuria, flank pain, suprapubic pain, frequency, urgency, fever, chills, nausea, vomiting  Denies passage of stones over the past year  Having right sided groin pain that is intermittent  Review of Systems   Constitutional: Negative for activity change, appetite change, chills and fever  HENT: Negative for congestion and trouble swallowing  Respiratory: Negative for cough and shortness of breath  Cardiovascular: Negative for chest pain, palpitations and leg swelling  Gastrointestinal: Negative for abdominal pain, constipation, diarrhea, nausea and vomiting  Genitourinary: Negative for difficulty urinating, dysuria, flank pain, frequency, hematuria and urgency  Musculoskeletal: Negative for back pain and gait problem  Skin: Negative for wound  Allergic/Immunologic: Negative for immunocompromised state  Neurological: Negative for dizziness and syncope  Hematological: Does not bruise/bleed easily  Psychiatric/Behavioral: Negative for confusion  All other systems reviewed and are negative        Vitals  Vitals:    02/22/22 1452   BP: 120/72   Pulse: 82   SpO2: 98%   Weight: 92 1 kg (203 lb)   Height: 5' 2" (1 575 m)       Physical Exam  Constitutional:       General: He is not in acute distress  Appearance: Normal appearance  He is not ill-appearing, toxic-appearing or diaphoretic  HENT:      Head: Normocephalic  Nose: No congestion  Eyes:      General: No scleral icterus  Right eye: No discharge  Left eye: No discharge  Conjunctiva/sclera: Conjunctivae normal       Pupils: Pupils are equal, round, and reactive to light  Pulmonary:      Effort: Pulmonary effort is normal    Musculoskeletal:      Cervical back: Normal range of motion  Skin:     General: Skin is warm and dry  Coloration: Skin is not jaundiced or pale  Findings: No bruising, erythema, lesion or rash  Neurological:      General: No focal deficit present  Mental Status: He is alert and oriented to person, place, and time  Mental status is at baseline  Gait: Gait normal    Psychiatric:         Mood and Affect: Mood normal          Behavior: Behavior normal          Thought Content: Thought content normal          Judgment: Judgment normal            Past History  Past Medical History:   Diagnosis Date    Gout     Hypertension     resolved    Mitral valve prolapse     Neutropenia (HCC)     Thrombocytosis     last assessed: 2016     Social History     Socioeconomic History    Marital status: /Civil Union     Spouse name: None    Number of children: None    Years of education: None    Highest education level: None   Occupational History    Occupation: full-time employment   Tobacco Use    Smoking status: Former Smoker     Packs/day: 0 03     Years: 3 00     Pack years: 0 09     Types: Cigarettes     Quit date: 1999     Years since quittin 8    Smokeless tobacco: Never Used   Vaping Use    Vaping Use: Never used   Substance and Sexual Activity    Alcohol use:  Yes     Alcohol/week: 6 0 standard drinks     Types: 6 Cans of beer per week     Comment: week; social (as per allscripts)    Drug use: No    Sexual activity: Yes     Partners: Female Other Topics Concern    None   Social History Narrative    None     Social Determinants of Health     Financial Resource Strain: Not on file   Food Insecurity: Not on file   Transportation Needs: Not on file   Physical Activity: Sufficiently Active    Days of Exercise per Week: 2 days    Minutes of Exercise per Session: 90 min   Stress: No Stress Concern Present    Feeling of Stress : Not at all   Social Connections: Not on file   Intimate Partner Violence: Not on file   Housing Stability: Not on file     Social History     Tobacco Use   Smoking Status Former Smoker    Packs/day: 0 03    Years: 3 00    Pack years: 0 09    Types: Cigarettes    Quit date: 1999    Years since quittin 8   Smokeless Tobacco Never Used     Family History   Problem Relation Age of Onset    Stomach cancer Father     Hypertension Father         benign essential    Diverticulosis Father     Breast cancer Sister     Diabetes Maternal Grandmother         mellitus    Ovarian cancer Paternal Grandmother     Ovarian cancer Child     Breast cancer Maternal Aunt     Lung cancer Paternal Uncle     Pancreatic cancer Paternal Uncle     Lung cancer Cousin        The following portions of the patient's history were reviewed and updated as appropriate: allergies, current medications, past medical history, past social history, past surgical history and problem list     Results  No results found for this or any previous visit (from the past 1 hour(s)) ]  Lab Results   Component Value Date    PSA 0 4 2022    PSA 0 4 2021    PSA 0 5 2020    PSA 0 4 2018     Lab Results   Component Value Date    GLUCOSE 97 2016    CALCIUM 8 4 2021     2016    K 3 9 2021    CO2 26 2021     2021    BUN 11 2021    CREATININE 1 00 2021     Lab Results   Component Value Date    WBC 5 02 2021    HGB 14 1 2021    HCT 41 9 2021     (H) 08/06/2021     08/06/2021       Reji Elizondo, PADiannaC

## 2022-02-28 ENCOUNTER — HOSPITAL ENCOUNTER (OUTPATIENT)
Dept: ULTRASOUND IMAGING | Facility: HOSPITAL | Age: 57
Discharge: HOME | End: 2022-02-28
Payer: COMMERCIAL

## 2022-02-28 DIAGNOSIS — R10.31 RIGHT INGUINAL PAIN: ICD-10-CM

## 2022-02-28 PROCEDURE — 76870 US EXAM SCROTUM: CPT

## 2022-03-03 ENCOUNTER — TELEPHONE (OUTPATIENT)
Dept: UROLOGY | Facility: CLINIC | Age: 57
End: 2022-03-03

## 2022-03-03 NOTE — TELEPHONE ENCOUNTER
----- Message from Samara Dakin, PA-C sent at 3/3/2022  8:39 AM EST -----  Ultrasounds negative    should follow-up as scheduled

## 2022-05-27 ENCOUNTER — OFFICE VISIT (OUTPATIENT)
Dept: GASTROENTEROLOGY | Facility: CLINIC | Age: 57
End: 2022-05-27
Payer: COMMERCIAL

## 2022-05-27 VITALS
HEART RATE: 79 BPM | BODY MASS INDEX: 24.18 KG/M2 | HEIGHT: 74 IN | OXYGEN SATURATION: 98 % | SYSTOLIC BLOOD PRESSURE: 140 MMHG | WEIGHT: 188.4 LBS | DIASTOLIC BLOOD PRESSURE: 100 MMHG

## 2022-05-27 DIAGNOSIS — K58.0 IRRITABLE BOWEL SYNDROME WITH DIARRHEA: Primary | ICD-10-CM

## 2022-05-27 DIAGNOSIS — R63.4 WEIGHT LOSS: ICD-10-CM

## 2022-05-27 PROCEDURE — 99214 OFFICE O/P EST MOD 30 MIN: CPT | Performed by: INTERNAL MEDICINE

## 2022-05-27 PROCEDURE — 3008F BODY MASS INDEX DOCD: CPT | Performed by: INTERNAL MEDICINE

## 2022-05-27 PROCEDURE — 1036F TOBACCO NON-USER: CPT | Performed by: INTERNAL MEDICINE

## 2022-05-27 RX ORDER — DICYCLOMINE HCL 20 MG
20 TABLET ORAL EVERY 6 HOURS
Qty: 60 TABLET | Refills: 3 | Status: SHIPPED | OUTPATIENT
Start: 2022-05-27

## 2022-05-27 NOTE — PROGRESS NOTES
[facility] - Outpatient Consultation  Leroy Jones 60 y o  male MRN: [id number]  Encounter: [id number]          ASSESSMENT AND PLAN:      1  Irritable bowel syndrome with diarrhea  - Eluxadoline 75 MG TABS; Take 1 tablet (75 mg total) by mouth 2 (two) times a day  Dispense: 62 tablet; Refill: 3  - dicyclomine (BENTYL) 20 mg tablet; Take 1 tablet (20 mg total) by mouth every 6 (six) hours  Dispense: 60 tablet; Refill: 3  - CBC (Includes Diff/Plt) (Refl); Future  - Sedimentation rate, automated; Future  - C-reactive protein; Future  - Calprotectin,Fecal; Future  - Comprehensive metabolic panel; Future  - CT abdomen pelvis wo contrast; Future    2  Weight loss  - CBC (Includes Diff/Plt) (Refl); Future  - Sedimentation rate, automated; Future  - C-reactive protein; Future  - Calprotectin,Fecal; Future  - Comprehensive metabolic panel; Future  - CT abdomen pelvis wo contrast; Future    ______________________________________________________________________    HPI:  Ailene returns to the office today stating he is continuing to have problems with frequent bowel movements every day  He has at least 3 daily loose bowel movements with an increased amount of gaseousness as well as lower abdominal pains  The pain is described as a cramping pain  His last colonoscopy was performed on April 20, 2021  He was found have diverticulosis coli but an otherwise unremarkable colonoscopy to include a normal terminal ileum  He admits to a 21 pound weight loss over the past 14 months  The the this is not intentional   He is also waking up at night time with nocturnal abdominal pain  He states that he kidneys this pain by bending his left leg and externally rotating the hip  He denies nausea vomiting  He denies heartburn  He admits to gaseousness but denies bloating  There is no rectal bleeding  He indicated he has been very worried and scared about these ongoing symptoms    He actually contacted a [address] [place] [address] who did a telephone consult with him  [facility] gastroenterologist assess the situation is most consistent with irritable bowel syndrome  REVIEW OF SYSTEMS:    CONSTITUTIONAL: Denies any fever, chills, rigors, and weight loss  HEENT: No earache or tinnitus  Denies hearing loss or visual disturbances  CARDIOVASCULAR: No chest pain or palpitations  RESPIRATORY: Denies any cough, hemoptysis, shortness of breath or dyspnea on exertion  GASTROINTESTINAL: As noted in the History of Present Illness  GENITOURINARY: No problems with urination  Denies any hematuria or dysuria  NEUROLOGIC: No dizziness or vertigo, denies headaches  MUSCULOSKELETAL: Denies any muscle or joint pain  SKIN: Denies skin rashes or itching  ENDOCRINE: Denies excessive thirst  Denies intolerance to heat or cold  PSYCHOSOCIAL: Denies depression or anxiety  Denies any recent memory loss         Historical Information   Past Medical History:   Diagnosis Date    Gout     Hypertension     resolved    Mitral valve prolapse     Neutropenia (HCC)     Thrombocytosis     last assessed: 6/14/2016     Past Surgical History:   Procedure Laterality Date    COLONOSCOPY  2021    HAND SURGERY      ORTHOPEDIC SURGERY Left     hand fracture repaired    [place] CYSTO/URETERO W/LITHOTRIPSY &INDWELL STENT INSRT Right 6/21/2016    Procedure: CYSTOSCOPY; URETEROSCOPY;  RETROGRADE PYELOGRAM; STENT ;  Surgeon: Jagjit Higginbotham MD;  [facility];  Service: Urology    SHOULDER ARTHROSCOPY W/ LABRAL REPAIR      SHOULDER SURGERY       Social History   Social History     Substance and Sexual Activity   Alcohol Use Yes    Alcohol/week: 6 0 standard drinks    Types: 6 Cans of beer per week    Comment: week; social (as per allscripts)     Social History     Substance and Sexual Activity   Drug Use No     Social History     Tobacco Use   Smoking Status Former Smoker    Packs/day: 0 03    Years: 3 00    Pack years: 0 09  Types: Cigarettes    Quit date: 1999    Years since quittin 0   Smokeless Tobacco Never Used     Family History   Problem Relation Age of Onset    Stomach cancer Father     Hypertension Father         benign essential    Diverticulosis Father     Breast cancer Sister     Diabetes Maternal Grandmother         mellitus    Ovarian cancer Paternal Grandmother     Ovarian cancer Child     Breast cancer Maternal Aunt     Lung cancer Paternal Uncle     Pancreatic cancer Paternal Uncle     Lung cancer Cousin        Meds/Allergies       Current Outpatient Medications:     allopurinol (ZYLOPRIM) 300 mg tablet    dicyclomine (BENTYL) 20 mg tablet    Eluxadoline 75 MG TABS    pantoprazole (PROTONIX) 40 mg tablet    No Known Allergies        Objective     Blood pressure 140/100, pulse 79, height 6' 2" (1 88 m), weight 85 5 kg (188 lb 6 4 oz), SpO2 98 %  Body mass index is 24 19 kg/m²  PHYSICAL EXAM:      General Appearance:   Alert, cooperative, no distress   HEENT:   Normocephalic, atraumatic, anicteric      Neck:  Supple, symmetrical, trachea midline   Lungs:   Clear to auscultation bilaterally; no rales, rhonchi or wheezing; respirations unlabored    Heart[de-identified]   Regular rate and rhythm; no murmur, rub, or gallop  Abdomen:   Soft, non-tender, non-distended; normal bowel sounds; no masses, no organomegaly    Genitalia:   Deferred    Rectal:   Deferred    Extremities:  No cyanosis, clubbing or edema    Pulses:  2+ and symmetric    Skin:  No jaundice, rashes, or lesions    Lymph nodes:  No palpable cervical lymphadenopathy        Lab Results:   No visits with results within 1 Day(s) from this visit  Latest known visit with results is:   Appointment on 2022   Component Date Value    PSA 2022 0 4          Radiology Results:   No results found

## 2022-06-06 ENCOUNTER — TELEPHONE (OUTPATIENT)
Dept: GASTROENTEROLOGY | Facility: CLINIC | Age: 57
End: 2022-06-06

## 2022-06-07 ENCOUNTER — HOSPITAL ENCOUNTER (OUTPATIENT)
Dept: CT IMAGING | Facility: HOSPITAL | Age: 57
Discharge: HOME | End: 2022-06-07
Payer: COMMERCIAL

## 2022-06-07 DIAGNOSIS — K58.0 IRRITABLE BOWEL SYNDROME WITH DIARRHEA: ICD-10-CM

## 2022-06-07 DIAGNOSIS — R63.4 WEIGHT LOSS: ICD-10-CM

## 2022-06-07 PROCEDURE — 74176 CT ABD & PELVIS W/O CONTRAST: CPT

## 2022-06-07 PROCEDURE — G1004 CDSM NDSC: HCPCS

## 2022-06-16 ENCOUNTER — TELEPHONE (OUTPATIENT)
Dept: GASTROENTEROLOGY | Facility: CLINIC | Age: 57
End: 2022-06-16

## 2022-06-16 NOTE — TELEPHONE ENCOUNTER
Called and LMOM of CT scan results  The CT Scan shows no significant abnormalities  There is no mass and no acute inflammatory or infectious process  There is evidence of diverticulosis coli of this colon without diverticulitis  As per Dr Benson Chance

## 2022-06-16 NOTE — TELEPHONE ENCOUNTER
----- Message from Pablo Barton DO sent at 6/15/2022  4:09 PM EDT -----  Please call the patient with the CT scan results  The CT scan results shows no significant abnormalities  There is no mass and no acute inflammatory or infectious process  There is evidence of diverticulosis coli of this colon without diverticulitis  ----- Message from SUZI Bright sent at 7/10/2021  9:40 AM EDT -----  Moderate to severe CTS on right, moderate on Left - consult ortho

## 2022-06-17 ENCOUNTER — TELEPHONE (OUTPATIENT)
Dept: GASTROENTEROLOGY | Facility: CLINIC | Age: 57
End: 2022-06-17

## 2022-06-22 NOTE — TELEPHONE ENCOUNTER
[facility] faxed back that pt's insurance needs to be called  Did auth on covermymeds through [address]:  [id number]

## 2022-06-24 NOTE — TELEPHONE ENCOUNTER
Left message for patient that Doug Shaver was approed by insurance 4/24/22 to 6/23/23 faxed to pharmacy [facility] [phone] fax to chart/media

## 2022-07-01 RX ORDER — METHYLPREDNISOLONE 4 MG/1
TABLET ORAL
COMMUNITY
Start: 2022-05-26 | End: 2022-10-11 | Stop reason: CLARIF

## 2022-07-05 ENCOUNTER — APPOINTMENT (OUTPATIENT)
Dept: LAB | Facility: HOSPITAL | Age: 57
End: 2022-07-05
Payer: COMMERCIAL

## 2022-07-05 DIAGNOSIS — R63.4 WEIGHT LOSS: ICD-10-CM

## 2022-07-05 DIAGNOSIS — Z12.5 SCREENING FOR PROSTATE CANCER: ICD-10-CM

## 2022-07-05 DIAGNOSIS — K58.0 IRRITABLE BOWEL SYNDROME WITH DIARRHEA: ICD-10-CM

## 2022-07-05 LAB
ALBUMIN SERPL BCP-MCNC: 3.3 G/DL (ref 3.5–5)
ALP SERPL-CCNC: 47 U/L (ref 46–116)
ALT SERPL W P-5'-P-CCNC: 34 U/L (ref 12–78)
ANION GAP SERPL CALCULATED.3IONS-SCNC: 10 MMOL/L (ref 4–13)
AST SERPL W P-5'-P-CCNC: 40 U/L (ref 5–45)
BILIRUB SERPL-MCNC: 0.24 MG/DL (ref 0.2–1)
BUN SERPL-MCNC: 17 MG/DL (ref 5–25)
CALCIUM ALBUM COR SERPL-MCNC: 9 MG/DL (ref 8.3–10.1)
CALCIUM SERPL-MCNC: 8.4 MG/DL (ref 8.3–10.1)
CHLORIDE SERPL-SCNC: 107 MMOL/L (ref 100–108)
CO2 SERPL-SCNC: 24 MMOL/L (ref 21–32)
CREAT SERPL-MCNC: 0.99 MG/DL (ref 0.6–1.3)
CRP SERPL QL: <3 MG/L
ERYTHROCYTE [SEDIMENTATION RATE] IN BLOOD: <1 MM/HOUR (ref 0–19)
GFR SERPL CREATININE-BSD FRML MDRD: 83 ML/MIN/1.73SQ M
GLUCOSE SERPL-MCNC: 88 MG/DL (ref 65–140)
POTASSIUM SERPL-SCNC: 4.1 MMOL/L (ref 3.5–5.3)
PROT SERPL-MCNC: 6.2 G/DL (ref 6.4–8.2)
PSA SERPL-MCNC: 0.3 NG/ML (ref 0–4)
SODIUM SERPL-SCNC: 141 MMOL/L (ref 136–145)

## 2022-07-05 PROCEDURE — 86140 C-REACTIVE PROTEIN: CPT

## 2022-07-05 PROCEDURE — 84153 ASSAY OF PSA TOTAL: CPT

## 2022-07-05 PROCEDURE — 36415 COLL VENOUS BLD VENIPUNCTURE: CPT

## 2022-07-05 PROCEDURE — 80053 COMPREHEN METABOLIC PANEL: CPT

## 2022-07-05 PROCEDURE — 85652 RBC SED RATE AUTOMATED: CPT

## 2022-07-06 ENCOUNTER — TELEPHONE (OUTPATIENT)
Dept: UROLOGY | Facility: CLINIC | Age: 57
End: 2022-07-06

## 2022-07-06 ENCOUNTER — OFFICE VISIT (OUTPATIENT)
Dept: GASTROENTEROLOGY | Facility: CLINIC | Age: 57
End: 2022-07-06
Payer: COMMERCIAL

## 2022-07-06 ENCOUNTER — TELEPHONE (OUTPATIENT)
Dept: GASTROENTEROLOGY | Facility: CLINIC | Age: 57
End: 2022-07-06

## 2022-07-06 VITALS
BODY MASS INDEX: 23.74 KG/M2 | HEIGHT: 74 IN | WEIGHT: 185 LBS | HEART RATE: 88 BPM | SYSTOLIC BLOOD PRESSURE: 148 MMHG | DIASTOLIC BLOOD PRESSURE: 102 MMHG

## 2022-07-06 DIAGNOSIS — K58.0 IRRITABLE BOWEL SYNDROME WITH DIARRHEA: Primary | ICD-10-CM

## 2022-07-06 DIAGNOSIS — Z12.5 SCREENING FOR PROSTATE CANCER: Primary | ICD-10-CM

## 2022-07-06 DIAGNOSIS — R63.4 WEIGHT LOSS: ICD-10-CM

## 2022-07-06 PROCEDURE — 99214 OFFICE O/P EST MOD 30 MIN: CPT | Performed by: INTERNAL MEDICINE

## 2022-07-06 NOTE — PROGRESS NOTES
[facility] - Outpatient Follow-up Note  Jorge Huffman 60 y o  male MRN: [id number]  Encounter: [id number]          ASSESSMENT AND PLAN:      1  Irritable bowel syndrome with diarrhea  - dicyclomine, 20 mg up to 4 x daily  - Eluxadoline 75 mg bid, which he just started taking two days ago  - no dietary changes    2  Weight loss  - stable    ______________________________________________________________________    SUBJECTIVE:  Al returns the office today stating that he is feeling better  He denies any nausea vomiting  He denies any significant abdominal pain  He still going to the bathroom with diarrhea several times a day, however this is improving  He most recently received the new medication after was authorized  Eluxadoline the without authorization was 800 dollars for prescription but now that is been authorized this gone down to 30 dollars a bottle  We discussed the findings of his recent laboratory work  His CBC was normal   His inflammatory markers to include sedimentation rate and C-reactive protein were normal   CT scan of the abdomen and pelvis showed evidence of diverticulosis but no evidence of any inflammatory changes within the abdomen  REVIEW OF SYSTEMS IS OTHERWISE NEGATIVE        Historical Information   Past Medical History:   Diagnosis Date    Gout     Hypertension     resolved    Mitral valve prolapse     Neutropenia (HCC)     Thrombocytosis     last assessed: 6/14/2016     Past Surgical History:   Procedure Laterality Date    COLONOSCOPY  2021    HAND SURGERY      ORTHOPEDIC SURGERY Left     hand fracture repaired    [place] CYSTO/URETERO W/LITHOTRIPSY &INDWELL STENT INSRT Right 6/21/2016    Procedure: CYSTOSCOPY; URETEROSCOPY;  RETROGRADE PYELOGRAM; STENT ;  Surgeon: Karlos Soliz MD;  [facility];  Service: Urology    SHOULDER ARTHROSCOPY W/ LABRAL REPAIR      SHOULDER SURGERY       Social History   Social History     Substance and Sexual Activity   Alcohol Use Yes    Alcohol/week: 6 0 standard drinks    Types: 6 Cans of beer per week    Comment: week; social (as per allscripts)     Social History     Substance and Sexual Activity   Drug Use No     Social History     Tobacco Use   Smoking Status Former Smoker    Packs/day: 0 03    Years: 3 00    Pack years: 0 09    Types: Cigarettes    Quit date: 1999    Years since quittin 2   Smokeless Tobacco Never Used     Family History   Problem Relation Age of Onset    Stomach cancer Father     Hypertension Father         benign essential    Diverticulosis Father     Breast cancer Sister     Diabetes Maternal Grandmother         mellitus    Ovarian cancer Paternal Grandmother     Ovarian cancer Child     Breast cancer Maternal Aunt     Lung cancer Paternal Uncle     Pancreatic cancer Paternal Uncle     Lung cancer Cousin        Meds/Allergies       Current Outpatient Medications:     dicyclomine (BENTYL) 20 mg tablet    Eluxadoline 75 MG TABS    pantoprazole (PROTONIX) 40 mg tablet    allopurinol (ZYLOPRIM) 300 mg tablet    methylPREDNISolone 4 MG tablet therapy pack    No Known Allergies        Objective     Blood pressure (!) 148/102, pulse 88, height 6' 2" (1 88 m), weight 83 9 kg (185 lb)  Body mass index is 23 75 kg/m²  PHYSICAL EXAM:      General Appearance:   Alert, cooperative, no distress   HEENT:   Normocephalic, atraumatic, anicteric      Neck:  Supple, symmetrical, trachea midline   Lungs:   Clear to auscultation bilaterally; no rales, rhonchi or wheezing; respirations unlabored    Heart[de-identified]   Regular rate and rhythm; no murmur, rub, or gallop     Abdomen:   Soft, non-tender, non-distended; normal bowel sounds; no masses, no organomegaly    Genitalia:   Deferred    Rectal:   Deferred    Extremities:  No cyanosis, clubbing or edema    Pulses:  2+ and symmetric    Skin:  No jaundice, rashes, or lesions    Lymph nodes:  No palpable cervical lymphadenopathy        Lab Results:   No visits with results within 1 Day(s) from this visit  Latest known visit with results is:   Appointment on 07/05/2022   Component Date Value    Sed Rate 07/05/2022 <1     CRP 07/05/2022 <3 0     Sodium 07/05/2022 141     Potassium 07/05/2022 4 1     Chloride 07/05/2022 107     CO2 07/05/2022 24     ANION GAP 07/05/2022 10     BUN 07/05/2022 17     Creatinine 07/05/2022 0 99     Glucose 07/05/2022 88     Calcium 07/05/2022 8 4     Corrected Calcium 07/05/2022 9 0     AST 07/05/2022 40     ALT 07/05/2022 34     Alkaline Phosphatase 07/05/2022 47     Total Protein 07/05/2022 6 2 (A)    Albumin 07/05/2022 3 3 (A)    Total Bilirubin 07/05/2022 0 24     eGFR 07/05/2022 83     PSA, Diagnostic 07/05/2022 0 3          Radiology Results:   CT abdomen pelvis wo contrast    Result Date: 6/10/2022  Narrative: CT ABDOMEN AND PELVIS WITHOUT IV CONTRAST INDICATION:   K58 0: Irritable bowel syndrome with diarrhea R63 4: Abnormal weight loss  COMPARISON: CT 8/13/2021, MR 9/8/2021  TECHNIQUE:  CT examination of the abdomen and pelvis was performed without intravenous contrast  This examination was performed without intravenous contrast in the context of the critical nationwide Omnipaque shortage  Axial, sagittal, and coronal 2D reformatted images were created from the source data and submitted for interpretation  Radiation dose length product (DLP) for this visit:  440 mGy-cm   This examination, like all CT scans performed in the [facility], was performed utilizing techniques to minimize radiation dose exposure, including the use of iterative reconstruction and automated exposure control  Enteric contrast was administered  FINDINGS: ABDOMEN LOWER CHEST:  No clinically significant abnormality identified in the visualized lower chest  LIVER/BILIARY TREE:  Unremarkable    Previously noted right hepatic hypoattenuating lesion on prior CT 8/13/2021 is not apparent on the current exam, nor was it identified on intervening MR abdomen dated 9/20/2021  GALLBLADDER:  No calcified gallstones  No pericholecystic inflammatory change  SPLEEN:  Unremarkable  PANCREAS:  Unremarkable  ADRENAL GLANDS:  Unremarkable  KIDNEYS/URETERS:  Unremarkable  No hydronephrosis  STOMACH AND BOWEL:  There is colonic diverticulosis without evidence of acute diverticulitis  APPENDIX:  A normal appendix was visualized  ABDOMINOPELVIC CAVITY:  No ascites  No pneumoperitoneum  No lymphadenopathy  VESSELS:  Unremarkable for patient's age  PELVIS REPRODUCTIVE ORGANS:  Unremarkable for patient's age  URINARY BLADDER:  Unremarkable  ABDOMINAL WALL/INGUINAL REGIONS:  There is a small fat-containing umbilical hernia  OSSEOUS STRUCTURES:  No acute fracture or destructive osseous lesion  Impression: 1  Diverticular disease without diverticulitis  2   No acute inflammatory or infectious process  No significant bowel abnormality other than diverticular disease   Workstation performed: [id number]

## 2022-07-06 NOTE — TELEPHONE ENCOUNTER
Called and spoke with patient in regards to Chemistry panel results- completely normal and his inflammatory markers to include the C-reactive protein and sedimentation rate were normal  Pt voiced understanding and he has an appointment this morning  He has further questions that he will discuss with the provider on his visit

## 2022-07-06 NOTE — TELEPHONE ENCOUNTER
----- Message from Leia Cabrera DO sent at 7/6/2022  7:35 AM EDT -----  Please call the patient and make him aware that his chemistry panel was completely normal   His inflammatory markers to include the C-reactive protein and sedimentation rate were normal

## 2022-07-06 NOTE — TELEPHONE ENCOUNTER
----- Message from [id number] Elijah Davis sent at 7/6/2022 10:12 AM EDT -----  Recent PSA 0 3, patient will follow up in February 2022 for yearly office visit

## 2022-08-24 ENCOUNTER — OFFICE VISIT (OUTPATIENT)
Dept: GASTROENTEROLOGY | Facility: CLINIC | Age: 57
End: 2022-08-24
Payer: COMMERCIAL

## 2022-08-24 VITALS
BODY MASS INDEX: 24.26 KG/M2 | SYSTOLIC BLOOD PRESSURE: 144 MMHG | OXYGEN SATURATION: 97 % | WEIGHT: 189 LBS | HEIGHT: 74 IN | HEART RATE: 89 BPM | DIASTOLIC BLOOD PRESSURE: 100 MMHG

## 2022-08-24 DIAGNOSIS — K58.0 IRRITABLE BOWEL SYNDROME WITH DIARRHEA: ICD-10-CM

## 2022-08-24 DIAGNOSIS — R10.30 LOWER ABDOMINAL PAIN: Primary | ICD-10-CM

## 2022-08-24 PROCEDURE — 99214 OFFICE O/P EST MOD 30 MIN: CPT | Performed by: INTERNAL MEDICINE

## 2022-08-24 NOTE — PROGRESS NOTES
[facility] - Outpatient Follow-up Note  Keith Miranda 59 y o  male MRN: [id number]  Encounter: [id number]          ASSESSMENT AND PLAN:      1  Lower abdominal pain    2  Irritable bowel syndrome with diarrhea    The issues of a low FODMAP diet have been discussed with the patient and handout was provided      Continue to use Bentyl up to 4 times a day, 20 mg    Probiotic should be taken daily, either Florastor or align, generic brand    Fiber supplementation twice daily    No additional diagnostic or endoscopic workup required at this time    ______________________________________________________________________    SUBJECTIVE:  Al returns to the office today stating that he has been having intermittent unprovoked episodes of lower abdominal pain which is described as a sharp pain  These episodes are occurring on occasion  The abdominal pain is more frequent  He had episode of diarrhea that lasted 1 and half days last week  There is no rectal bleeding  The plane last unpredictably  It is intermittent  It waxes and wanes  He states that the bowel movement does ease the pain  These episodes occur generally during the daytime  The diarrhea episode was severe last week  He thought perhaps that eating out at a seafood restaurant may have been provocative but he is not certain  He does admit to bloating as well  There is no nausea vomiting  There has been no weight loss  He states that he uses Bentyl and this does control the pain  REVIEW OF SYSTEMS IS OTHERWISE NEGATIVE        Historical Information   Past Medical History:   Diagnosis Date    Gout     Hypertension     resolved    Mitral valve prolapse     Neutropenia (HCC)     Thrombocytosis     last assessed: 6/14/2016     Past Surgical History:   Procedure Laterality Date    COLONOSCOPY  2021    HAND SURGERY      ORTHOPEDIC SURGERY Left     hand fracture repaired    [place] CYSTO/URETERO W/LITHOTRIPSY &INDWELL STENT INSRT Right 2016    Procedure: CYSTOSCOPY; URETEROSCOPY;  RETROGRADE PYELOGRAM; STENT ;  Surgeon: Sherif Rivas MD;  Location: [facility];  Service: Urology    SHOULDER ARTHROSCOPY W/ LABRAL REPAIR      SHOULDER SURGERY       Social History   Social History     Substance and Sexual Activity   Alcohol Use Yes    Alcohol/week: 6 0 standard drinks    Types: 6 Cans of beer per week    Comment: week; social (as per allscripts)     Social History     Substance and Sexual Activity   Drug Use No     Social History     Tobacco Use   Smoking Status Former Smoker    Packs/day: 0 03    Years: 3 00    Pack years: 0 09    Types: Cigarettes    Quit date: 1999    Years since quittin 3   Smokeless Tobacco Never Used     Family History   Problem Relation Age of Onset    Stomach cancer Father     Hypertension Father         benign essential    Diverticulosis Father     Breast cancer Sister     Diabetes Maternal Grandmother         mellitus    Ovarian cancer Paternal Grandmother     Ovarian cancer Child     Breast cancer Maternal Aunt     Lung cancer Paternal Uncle     Pancreatic cancer Paternal Uncle     Lung cancer Cousin        Meds/Allergies       Current Outpatient Medications:     dicyclomine (BENTYL) 20 mg tablet    Eluxadoline 75 MG TABS    pantoprazole (PROTONIX) 40 mg tablet    allopurinol (ZYLOPRIM) 300 mg tablet    methylPREDNISolone 4 MG tablet therapy pack    No Known Allergies        Objective     Blood pressure 144/100, pulse 89, height 6' 2" (1 88 m), weight 85 7 kg (189 lb), SpO2 97 %  Body mass index is 24 27 kg/m²  PHYSICAL EXAM:      General Appearance:   Alert, cooperative, no distress   HEENT:   Normocephalic, atraumatic, anicteric      Neck:  Supple, symmetrical, trachea midline   Lungs:   Clear to auscultation bilaterally; no rales, rhonchi or wheezing; respirations unlabored    Heart[de-identified]   Regular rate and rhythm; no murmur, rub, or gallop     Abdomen:   Soft, non-tender, non-distended; normal bowel sounds; no masses, no organomegaly    Genitalia:   Deferred    Rectal:   Deferred    Extremities:  No cyanosis, clubbing or edema    Pulses:  2+ and symmetric    Skin:  No jaundice, rashes, or lesions    Lymph nodes:  No palpable cervical lymphadenopathy        Lab Results:   No visits with results within 1 Day(s) from this visit  Latest known visit with results is:   Appointment on 07/05/2022   Component Date Value    Sed Rate 07/05/2022 <1     CRP 07/05/2022 <3 0     Sodium 07/05/2022 141     Potassium 07/05/2022 4 1     Chloride 07/05/2022 107     CO2 07/05/2022 24     ANION GAP 07/05/2022 10     BUN 07/05/2022 17     Creatinine 07/05/2022 0 99     Glucose 07/05/2022 88     Calcium 07/05/2022 8 4     Corrected Calcium 07/05/2022 9 0     AST 07/05/2022 40     ALT 07/05/2022 34     Alkaline Phosphatase 07/05/2022 47     Total Protein 07/05/2022 6 2 (A)    Albumin 07/05/2022 3 3 (A)    Total Bilirubin 07/05/2022 0 24     eGFR 07/05/2022 83     PSA, Diagnostic 07/05/2022 0 3          Radiology Results:   No results found

## 2022-09-30 ENCOUNTER — TELEPHONE (OUTPATIENT)
Dept: GASTROENTEROLOGY | Facility: CLINIC | Age: 57
End: 2022-09-30

## 2022-09-30 NOTE — TELEPHONE ENCOUNTER
Spoke with patient  History of lower abdominal pain, IBS-D    Patient c/o new onset RUQ "discomfort", he states "It feels like a lump"  Taking bentyl 20mg QID  He has not started probiotic or fiber supplement  Passing 2 diarrhea movements a week  Denies n/v, fever chills, SOB,weakness, black or bloody stools  He will start probiotic and fiber supplement       Would you like patient to follow-up with RUQ US?

## 2022-09-30 NOTE — TELEPHONE ENCOUNTER
Patients GI provider:  Dr Carissa Navarro    Number to return call: [phone]    Reason for call: Pt calling because he is having a lot of pain in the upper right quadrant  He would like an earlier appointment if possible   He has been added to the wait list    Scheduled procedure/appointment date if applicable: Appt  89/34/97

## 2022-10-05 ENCOUNTER — TREATMENT (OUTPATIENT)
Dept: GASTROENTEROLOGY | Facility: CLINIC | Age: 57
End: 2022-10-05

## 2022-10-05 DIAGNOSIS — R10.11 RUQ ABDOMINAL PAIN: Primary | ICD-10-CM

## 2022-10-07 ENCOUNTER — HOSPITAL ENCOUNTER (OUTPATIENT)
Dept: ULTRASOUND IMAGING | Facility: CLINIC | Age: 57
Discharge: HOME | End: 2022-10-07
Payer: COMMERCIAL

## 2022-10-07 DIAGNOSIS — R10.11 RUQ ABDOMINAL PAIN: ICD-10-CM

## 2022-10-07 PROCEDURE — 76705 ECHO EXAM OF ABDOMEN: CPT

## 2022-10-08 ENCOUNTER — OFFICE VISIT (OUTPATIENT)
Dept: URGENT CARE | Facility: CLINIC | Age: 57
End: 2022-10-08
Payer: COMMERCIAL

## 2022-10-08 VITALS
WEIGHT: 188 LBS | BODY MASS INDEX: 24.13 KG/M2 | OXYGEN SATURATION: 97 % | TEMPERATURE: 97.8 F | RESPIRATION RATE: 18 BRPM | HEART RATE: 82 BPM | HEIGHT: 74 IN

## 2022-10-08 DIAGNOSIS — L03.012 PARONYCHIA OF LEFT INDEX FINGER: Primary | ICD-10-CM

## 2022-10-08 PROCEDURE — 99213 OFFICE O/P EST LOW 20 MIN: CPT

## 2022-10-08 RX ORDER — GINSENG 100 MG
1 CAPSULE ORAL ONCE
Status: COMPLETED | OUTPATIENT
Start: 2022-10-08 | End: 2022-10-08

## 2022-10-08 RX ADMIN — Medication 1 SMALL APPLICATION: at 09:12

## 2022-10-08 NOTE — PROGRESS NOTES
[facility]        NAME: Ghulam King is a 59 y o  male  : 1965    MRN: [id number]  DATE: 2022  TIME: 9:08 AM    Assessment and Plan   Paronychia of left index finger [L03 012]  1  Paronychia of left index finger  bacitracin topical ointment 1 small application    mupirocin (BACTROBAN) 2 % ointment         Patient Instructions     Patient Instructions   Perform warm water soaks 3 times per day  Apply Mupirocin ointment and cover with bandaid  Monitor for signs of infection such as increased redness, swelling, tenderness, foul smelling drainage, or fevers  If present, return or follow up with PCP  Paronychia   WHAT YOU NEED TO KNOW:   Paronychia is an infection of your nail fold caused by bacteria or a fungus  The nail fold is the skin around your nail  Paronychia may happen suddenly and last for 6 weeks or longer  You may have paronychia on more than 1 finger or toe  DISCHARGE INSTRUCTIONS:   Medicines:   · Td vaccine  is a booster shot used to help prevent tetanus and diphtheria  The Td booster may be given to adolescents and adults every 10 years or for certain wounds and injuries  · Antibiotics: This medicine will help fight or prevent an infection  It may be given as a pill, cream, or ointment  · Steroids: This medicine will help decrease inflammation  It may be given as a pill, cream, or ointment  · Antifungal medicine: This medicine helps kill fungus that may be causing your infection  It may be given as a cream or ointment  · NSAIDs:  These medicines decrease pain and swelling  NSAIDs are available without a doctor's order  Ask your healthcare provider which medicine is right for you  Ask how much to take and when to take it  Take as directed  NSAIDs can cause stomach bleeding and kidney problems if not taken correctly  · Take your medicine as directed  Contact your healthcare provider if you think your medicine is not helping or if you have side effects  Tell him of her if you are allergic to any medicine  Keep a list of the medicines, vitamins, and herbs you take  Include the amounts, and when and why you take them  Bring the list or the pill bottles to follow-up visits  Carry your medicine list with you in case of an emergency  Follow up with your doctor as directed:  Write down your questions so you remember to ask them during your visits  Self-care:   · Soak your nail:  Soak your nail in a mixture of equal parts vinegar and water 3 or 4 times each day  This will help decrease inflammation  · Apply a warm compress:  Soak a washcloth in warm water and place it on your nail  This will help decrease inflammation  · Elevate:  Raise your nail above the level of your heart as often as you can  This will help decrease swelling and pain  Prop your nail on pillows or blankets to keep it elevated comfortably  · Use lotion:  Apply lotion after you wash your hands  This will prevent your skin from becoming too dry  Prevent paronychia:   · Avoid chemicals and allergens that may harm your skin and nails  This includes soaps, laundry detergents, and nail products  · Keep your nails clean and dry  Avoid soaking your nails in water  Use cotton-lined rubber gloves or wear 2 rubber gloves if you work with food or water  The gloves will help protect your nail folds  · Keep your nails short  Do not bite your nails, pick at your hangnails, suck your fingers, or wear fake nails  Bring your own nail tools when you go to the nail salon  Contact your healthcare provider if:   · Your nail becomes loose, deformed, or falls off  · You have a large abscess on your nail  · You have questions or concerns about your condition or care  Return to the emergency department if:   · You have severe nail pain  · The inflammation spreads to your hand or arm      © Copyright [employer] 2022 Information is for End User's use only and may not be sold, redistributed or otherwise used for commercial purposes  All illustrations and images included in CareNotes® are the copyrighted property of [employer]  or [employer]  The above information is an  only  It is not intended as medical advice for individual conditions or treatments  Talk to your doctor, nurse or pharmacist before following any medical regimen to see if it is safe and effective for you  Follow up with PCP in 3-5 days  Proceed to  ER if symptoms worsen  Chief Complaint     Chief Complaint   Patient presents with   • swollen finger     Left hand finger swollen  Denies any injuries  Noticed the swelling a week ago  History of Present Illness       HPI   Maurice Adam is a 59 y o  male presents with left index finger swelling at base of cuticle  Started 7-10 days ago  No known injuries or trauma  Denies fevers, chills, or drainage  He has not tried any interventions at home for the finger  Review of Systems   Review of Systems   Constitutional: Negative for chills and fever  Respiratory: Negative  Cardiovascular: Negative  Musculoskeletal: Negative for arthralgias and joint swelling  Skin: Positive for color change and wound  Negative for rash  Neurological: Negative for weakness and numbness           Current Medications       Current Outpatient Medications:   •  dicyclomine (BENTYL) 20 mg tablet, Take 1 tablet (20 mg total) by mouth every 6 (six) hours, Disp: 60 tablet, Rfl: 3  •  mupirocin (BACTROBAN) 2 % ointment, Apply topically 3 (three) times a day, Disp: 22 g, Rfl: 0  •  pantoprazole (PROTONIX) 40 mg tablet, take 1 tablet by mouth once daily, Disp: 90 tablet, Rfl: 0  •  allopurinol (ZYLOPRIM) 300 mg tablet, Take 300 mg by mouth daily (Patient not taking: No sig reported), Disp: , Rfl:   •  Eluxadoline 75 MG TABS, Take 1 tablet (75 mg total) by mouth 2 (two) times a day, Disp: 62 tablet, Rfl: 3  •  methylPREDNISolone 4 MG tablet therapy pack, use as directed FOLLOW DIRECTIONS ON BACK OF FOIL PACK (Patient not taking: No sig reported), Disp: , Rfl:     Current Facility-Administered Medications:   •  bacitracin topical ointment 1 small application, 1 small application, Topical, Once, JIGNESH Calvo    Current Allergies     Allergies as of 10/08/2022   • (No Known Allergies)            The following portions of the patient's history were reviewed and updated as appropriate: allergies, current medications, past family history, past medical history, past social history, past surgical history and problem list      Past Medical History:   Diagnosis Date   • Gout    • Hypertension     resolved   • Mitral valve prolapse    • Neutropenia (HCC)    • Thrombocytosis     last assessed: 6/14/2016       Past Surgical History:   Procedure Laterality Date   • COLONOSCOPY  2021   • HAND SURGERY     • ORTHOPEDIC SURGERY Left     hand fracture repaired   • [place] CYSTO/URETERO W/LITHOTRIPSY &INDWELL STENT INSRT Right 6/21/2016    Procedure: CYSTOSCOPY; URETEROSCOPY;  RETROGRADE PYELOGRAM; STENT ;  Surgeon: Verner Squires, MD;  Location[facility];  Service: Urology   • SHOULDER ARTHROSCOPY W/ LABRAL REPAIR     • SHOULDER SURGERY         Family History   Problem Relation Age of Onset   • Stomach cancer Father    • Hypertension Father         benign essential   • Diverticulosis Father    • Breast cancer Sister    • Diabetes Maternal Grandmother         mellitus   • Ovarian cancer Paternal Grandmother    • Ovarian cancer Child    • Breast cancer Maternal Aunt    • Lung cancer Paternal Uncle    • Pancreatic cancer Paternal Uncle    • Lung cancer Cousin          Medications have been verified  Objective   Pulse 82   Temp 97 8 °F (36 6 °C)   Resp 18   Ht 6' 2" (1 88 m)   Wt 85 3 kg (188 lb)   SpO2 97%   BMI 24 14 kg/m²        Physical Exam     Physical Exam  Vitals and nursing note reviewed  Constitutional:       General: He is not in acute distress  Appearance: Normal appearance  He is well-developed  Cardiovascular:      Rate and Rhythm: Normal rate and regular rhythm  Heart sounds: Normal heart sounds, S1 normal and S2 normal    Pulmonary:      Effort: Pulmonary effort is normal       Breath sounds: Normal breath sounds  Skin:     General: Skin is warm and dry  Capillary Refill: Capillary refill takes less than 2 seconds  Findings: Erythema and wound present  Comments: Base of index finger with moderate swelling and collection of yellow fluid  Site was cleansed with betadine, and numbing spray used  A #11 blade scalpel was used to make small puncture along cuticle line  Yellow pus was drained from site  Bacitracin and pressure dressing applied  Patient tolerated well  Psychiatric:         Behavior: Behavior is cooperative

## 2022-10-08 NOTE — PATIENT INSTRUCTIONS
Perform warm water soaks 3 times per day  Apply Mupirocin ointment and cover with bandaid  Monitor for signs of infection such as increased redness, swelling, tenderness, foul smelling drainage, or fevers  If present, return or follow up with PCP  Paronychia   WHAT YOU NEED TO KNOW:   Paronychia is an infection of your nail fold caused by bacteria or a fungus  The nail fold is the skin around your nail  Paronychia may happen suddenly and last for 6 weeks or longer  You may have paronychia on more than 1 finger or toe  DISCHARGE INSTRUCTIONS:   Medicines:   Td vaccine  is a booster shot used to help prevent tetanus and diphtheria  The Td booster may be given to adolescents and adults every 10 years or for certain wounds and injuries  Antibiotics: This medicine will help fight or prevent an infection  It may be given as a pill, cream, or ointment  Steroids: This medicine will help decrease inflammation  It may be given as a pill, cream, or ointment  Antifungal medicine: This medicine helps kill fungus that may be causing your infection  It may be given as a cream or ointment  NSAIDs:  These medicines decrease pain and swelling  NSAIDs are available without a doctor's order  Ask your healthcare provider which medicine is right for you  Ask how much to take and when to take it  Take as directed  NSAIDs can cause stomach bleeding and kidney problems if not taken correctly  Take your medicine as directed  Contact your healthcare provider if you think your medicine is not helping or if you have side effects  Tell him of her if you are allergic to any medicine  Keep a list of the medicines, vitamins, and herbs you take  Include the amounts, and when and why you take them  Bring the list or the pill bottles to follow-up visits  Carry your medicine list with you in case of an emergency  Follow up with your doctor as directed:  Write down your questions so you remember to ask them during your visits  Self-care:   Soak your nail:  Soak your nail in a mixture of equal parts vinegar and water 3 or 4 times each day  This will help decrease inflammation  Apply a warm compress:  Soak a washcloth in warm water and place it on your nail  This will help decrease inflammation  Elevate:  Raise your nail above the level of your heart as often as you can  This will help decrease swelling and pain  Prop your nail on pillows or blankets to keep it elevated comfortably  Use lotion:  Apply lotion after you wash your hands  This will prevent your skin from becoming too dry  Prevent paronychia:   Avoid chemicals and allergens that may harm your skin and nails  This includes soaps, laundry detergents, and nail products  Keep your nails clean and dry  Avoid soaking your nails in water  Use cotton-lined rubber gloves or wear 2 rubber gloves if you work with food or water  The gloves will help protect your nail folds  Keep your nails short  Do not bite your nails, pick at your hangnails, suck your fingers, or wear fake nails  Bring your own nail tools when you go to the nail salon  Contact your healthcare provider if:   Your nail becomes loose, deformed, or falls off  You have a large abscess on your nail  You have questions or concerns about your condition or care  Return to the emergency department if:   You have severe nail pain  The inflammation spreads to your hand or arm  © Copyright [employer] 2022 Information is for End User's use only and may not be sold, redistributed or otherwise used for commercial purposes  All illustrations and images included in CareNotes® are the copyrighted property of [employer] Inc  or [employer]  The above information is an  only  It is not intended as medical advice for individual conditions or treatments  Talk to your doctor, nurse or pharmacist before following any medical regimen to see if it is safe and effective for you

## 2022-10-11 ENCOUNTER — OFFICE VISIT (OUTPATIENT)
Dept: INTERNAL MEDICINE CLINIC | Facility: CLINIC | Age: 57
End: 2022-10-11
Payer: COMMERCIAL

## 2022-10-11 VITALS
DIASTOLIC BLOOD PRESSURE: 88 MMHG | HEIGHT: 74 IN | TEMPERATURE: 96.9 F | SYSTOLIC BLOOD PRESSURE: 136 MMHG | BODY MASS INDEX: 24.51 KG/M2 | OXYGEN SATURATION: 99 % | HEART RATE: 67 BPM | WEIGHT: 191 LBS | RESPIRATION RATE: 20 BRPM

## 2022-10-11 DIAGNOSIS — Z00.00 ADULT GENERAL MEDICAL EXAMINATION: Primary | ICD-10-CM

## 2022-10-11 DIAGNOSIS — R10.13 EPIGASTRIC PAIN: ICD-10-CM

## 2022-10-11 DIAGNOSIS — R73.01 IMPAIRED FASTING GLUCOSE: ICD-10-CM

## 2022-10-11 PROBLEM — R10.30 LOWER ABDOMINAL PAIN: Status: RESOLVED | Noted: 2021-04-20 | Resolved: 2022-10-11

## 2022-10-11 PROCEDURE — 99396 PREV VISIT EST AGE 40-64: CPT | Performed by: INTERNAL MEDICINE

## 2022-10-11 RX ORDER — PANTOPRAZOLE SODIUM 40 MG/1
40 TABLET, DELAYED RELEASE ORAL DAILY
Qty: 90 TABLET | Refills: 3 | Status: SHIPPED | OUTPATIENT
Start: 2022-10-11

## 2022-10-11 NOTE — PATIENT INSTRUCTIONS
Wellness Visit for Adults   AMBULATORY CARE:   A wellness visit  is when you see your healthcare provider to get screened for health problems  Your healthcare provider will also give you advice on how to stay healthy  Write down your questions so you remember to ask them  Ask your healthcare provider how often you should have a wellness visit  What happens at a wellness visit:  Your healthcare provider will ask about your health, and your family history of health problems  This includes high blood pressure, heart disease, and cancer  He or she will ask if you have symptoms that concern you, if you smoke, and about your mood  You may also be asked about your intake of medicines, supplements, food, and alcohol  Any of the following may be done: Your weight  will be checked  Your height may also be checked so your body mass index (BMI) can be calculated  Your BMI shows if you are at a healthy weight  Your blood pressure  and heart rate will be checked  Your temperature may also be checked  Blood and urine tests  may be done  Blood tests may be done to check your cholesterol levels  Abnormal cholesterol levels increase your risk for heart disease and stroke  You may also need a blood or urine test to check for diabetes if you are at increased risk  Urine tests may be done to look for signs of an infection or kidney disease  A physical exam  includes checking your heartbeat and lungs with a stethoscope  Your healthcare provider may also check your skin to look for sun damage  Screening tests  may be recommended  A screening test is done to check for diseases that may not cause symptoms  The screening tests you may need depend on your age, gender, family history, and lifestyle habits  For example, colorectal screening may be recommended if you are 48years old or older  Screening tests you need if you are a woman:   A Pap smear  is used to screen for cervical cancer   Pap smears are usually done every 3 to 5 years depending on your age  You may need them more often if you have had abnormal Pap smear test results in the past  Ask your healthcare provider how often you should have a Pap smear  A mammogram  is an x-ray of your breasts to screen for breast cancer  Experts recommend mammograms every 2 years starting at age 48 years  You may need a mammogram at age 52 years or younger if you have an increased risk for breast cancer  Talk to your healthcare provider about when you should start having mammograms and how often you need them  Vaccines you may need:   Get an influenza vaccine  every year  The influenza vaccine protects you from the flu  Several types of viruses cause the flu  The viruses change over time, so new vaccines are made each year  Get a tetanus-diphtheria (Td) booster vaccine  every 10 years  This vaccine protects you against tetanus and diphtheria  Tetanus is a severe infection that may cause painful muscle spasms and lockjaw  Diphtheria is a severe bacterial infection that causes a thick covering in the back of your mouth and throat  Get a human papillomavirus (HPV) vaccine  if you are female and aged 23 to 32 or male 23 to 24 and never received it  This vaccine protects you from HPV infection  HPV is the most common infection spread by sexual contact  HPV may also cause vaginal, penile, and anal cancers  Get a pneumococcal vaccine  if you are aged 72 years or older  The pneumococcal vaccine is an injection given to protect you from pneumococcal disease  Pneumococcal disease is an infection caused by pneumococcal bacteria  The infection may cause pneumonia, meningitis, or an ear infection  Get a shingles vaccine  if you are 60 or older, even if you have had shingles before  The shingles vaccine is an injection to protect you from the varicella-zoster virus  This is the same virus that causes chickenpox   Shingles is a painful rash that develops in people who had chickenpox or have been exposed to the virus  How to eat healthy:  My Plate is a model for planning healthy meals  It shows the types and amounts of foods that should go on your plate  Fruits and vegetables make up about half of your plate, and grains and protein make up the other half  A serving of dairy is included on the side of your plate  The amount of calories and serving sizes you need depends on your age, gender, weight, and height  Examples of healthy foods are listed below:  Eat a variety of vegetables  such as dark green, red, and orange vegetables  You can also include canned vegetables low in sodium (salt) and frozen vegetables without added butter or sauces  Eat a variety of fresh fruits , canned fruit in 100% juice, frozen fruit, and dried fruit  Include whole grains  At least half of the grains you eat should be whole grains  Examples include whole-wheat bread, wheat pasta, brown rice, and whole-grain cereals such as oatmeal     Eat a variety of protein foods such as seafood (fish and shellfish), lean meat, and poultry without skin (turkey and chicken)  Examples of lean meats include pork leg, shoulder, or tenderloin, and beef round, sirloin, tenderloin, and extra lean ground beef  Other protein foods include eggs and egg substitutes, beans, peas, soy products, nuts, and seeds  Choose low-fat dairy products such as skim or 1% milk or low-fat yogurt, cheese, and cottage cheese  Limit unhealthy fats  such as butter, hard margarine, and shortening  Exercise:  Exercise at least 30 minutes per day on most days of the week  Some examples of exercise include walking, biking, dancing, and swimming  You can also fit in more physical activity by taking the stairs instead of the elevator or parking farther away from stores  Include muscle strengthening activities 2 days each week  Regular exercise provides many health benefits   It helps you manage your weight, and decreases your risk for type 2 diabetes, heart disease, stroke, and high blood pressure  Exercise can also help improve your mood  Ask your healthcare provider about the best exercise plan for you  General health and safety guidelines:   Do not smoke  Nicotine and other chemicals in cigarettes and cigars can cause lung damage  Ask your healthcare provider for information if you currently smoke and need help to quit  E-cigarettes or smokeless tobacco still contain nicotine  Talk to your healthcare provider before you use these products  Limit alcohol  A drink of alcohol is 12 ounces of beer, 5 ounces of wine, or 1½ ounces of liquor  Lose weight, if needed  Being overweight increases your risk of certain health conditions  These include heart disease, high blood pressure, type 2 diabetes, and certain types of cancer  Protect your skin  Do not sunbathe or use tanning beds  Use sunscreen with a SPF 15 or higher  Apply sunscreen at least 15 minutes before you go outside  Reapply sunscreen every 2 hours  Wear protective clothing, hats, and sunglasses when you are outside  Drive safely  Always wear your seatbelt  Make sure everyone in your car wears a seatbelt  A seatbelt can save your life if you are in an accident  Do not use your cell phone when you are driving  This could distract you and cause an accident  Pull over if you need to make a call or send a text message  Practice safe sex  Use latex condoms if are sexually active and have more than one partner  Your healthcare provider may recommend screening tests for sexually transmitted infections (STIs)  Wear helmets, lifejackets, and protective gear  Always wear a helmet when you ride a bike or motorcycle, go skiing, or play sports that could cause a head injury  Wear protective equipment when you play sports  Wear a lifejacket when you are on a boat or doing water sports      © Copyright [employer] 2022 Information is for End User's use only and may not be sold, redistributed or otherwise used for commercial purposes  All illustrations and images included in CareNotes® are the copyrighted property of A [employer]  or [employer]  The above information is an  only  It is not intended as medical advice for individual conditions or treatments  Talk to your doctor, nurse or pharmacist before following any medical regimen to see if it is safe and effective for you

## 2022-10-11 NOTE — PROGRESS NOTES
ADULT ANNUAL PHYSICAL   [facility]    NAME: Nato Koch  AGE: 59 y o  SEX: male  : 1965     DATE: 10/11/2022     Assessment and Plan:     Problem List Items Addressed This Visit        Endocrine    Impaired fasting glucose    Relevant Orders    Hemoglobin A1C      Other Visit Diagnoses     Adult general medical examination    -  Primary    Relevant Orders    CBC    Basic metabolic panel    Lipid Panel with Direct LDL reflex    Epigastric pain        Relevant Medications    pantoprazole (PROTONIX) 40 mg tablet        Healthy adult male  Continue follow-up with GI  Recheck labs in 1 year  Stay active  Immunizations and preventive care screenings were discussed with patient today  Appropriate education was printed on patient's after visit summary  Counseling:  Alcohol/drug use: discussed moderation in alcohol intake, the recommendations for healthy alcohol use, and avoidance of illicit drug use  Dental Health: discussed importance of regular tooth brushing, flossing, and dental visits  Injury prevention: discussed safety/seat belts, safety helmets, smoke detectors, carbon dioxide detectors, and smoking near bedding or upholstery  Sexual health: discussed sexually transmitted diseases, partner selection, use of condoms, avoidance of unintended pregnancy, and contraceptive alternatives  · Exercise: the importance of regular exercise/physical activity was discussed  Recommend exercise 3-5 times per week for at least 30 minutes  Depression Screening and Follow-up Plan: Patient was screened for depression during today's encounter  They screened negative with a PHQ-2 score of 0  Return in about 1 year (around 10/12/2023)       Chief Complaint:     Chief Complaint   Patient presents with   • Medication Refill     Blood work       History of Present Illness:     Adult Annual Physical   Patient here for a comprehensive physical exam  The patient reports health is stable  Some chronic GI issues and he is following with GI  Depression Screening  PHQ-2/9 Depression Screening    Little interest or pleasure in doing things: 0 - not at all  Feeling down, depressed, or hopeless: 0 - not at all  PHQ-2 Score: 0  PHQ-2 Interpretation: Negative depression screen       General Health  · Sleep: sleeps well  · Hearing: normal - bilateral   · Vision: no vision problems  · Dental: regular dental visits  Review of Systems:     Review of Systems   Constitutional: Negative for appetite change, chills, fatigue and fever  HENT: Negative for congestion, hearing loss, postnasal drip, rhinorrhea, sore throat, tinnitus and trouble swallowing  Eyes: Negative for pain, discharge, redness and visual disturbance  Respiratory: Negative for cough, chest tightness, shortness of breath and wheezing  Cardiovascular: Negative for chest pain, palpitations and leg swelling  Gastrointestinal: Positive for abdominal pain  Negative for abdominal distention, blood in stool, constipation, diarrhea, nausea and vomiting  Endocrine: Negative for cold intolerance, heat intolerance, polydipsia, polyphagia and polyuria  Genitourinary: Negative for difficulty urinating, dysuria, frequency, hematuria and urgency  Musculoskeletal: Negative for arthralgias, back pain, gait problem, joint swelling, myalgias, neck pain and neck stiffness  Skin: Negative for color change and rash  Neurological: Negative for dizziness, tremors, seizures, syncope, speech difficulty, weakness, light-headedness, numbness and headaches  Hematological: Negative for adenopathy  Does not bruise/bleed easily  Psychiatric/Behavioral: Negative for agitation, behavioral problems, confusion, hallucinations, sleep disturbance and suicidal ideas  The patient is not nervous/anxious         Past Medical History:     Past Medical History:   Diagnosis Date   • Gout    • Hypertension     resolved • Mitral valve prolapse    • Neutropenia (HCC)    • Thrombocytosis     last assessed: 2016      Past Surgical History:     Past Surgical History:   Procedure Laterality Date   • COLONOSCOPY     • HAND SURGERY     • ORTHOPEDIC SURGERY Left     hand fracture repaired   • LA CYSTO/URETERO W/LITHOTRIPSY &INDWELL STENT INSRT Right 2016    Procedure: CYSTOSCOPY; URETEROSCOPY;  RETROGRADE PYELOGRAM; STENT ;  Surgeon: Meera Herron MD;  Location: [facility];  Service: Urology   • SHOULDER ARTHROSCOPY W/ LABRAL REPAIR     • SHOULDER SURGERY        Family History:     Family History   Problem Relation Age of Onset   • Stomach cancer Father    • Hypertension Father         benign essential   • Diverticulosis Father    • Breast cancer Sister    • Diabetes Maternal Grandmother         mellitus   • Ovarian cancer Paternal Grandmother    • Ovarian cancer Child    • Breast cancer Maternal Aunt    • Lung cancer Paternal Uncle    • Pancreatic cancer Paternal Uncle    • Lung cancer Cousin       Social History:     Social History     Socioeconomic History   • Marital status: /Civil Union     Spouse name: None   • Number of children: None   • Years of education: None   • Highest education level: None   Occupational History   • Occupation: full-time employment   Tobacco Use   • Smoking status: Former Smoker     Packs/day: 0 03     Years: 3 00     Pack years: 0 09     Types: Cigarettes     Quit date: 1999     Years since quittin 4   • Smokeless tobacco: Never Used   Vaping Use   • Vaping Use: Never used   Substance and Sexual Activity   • Alcohol use:  Yes     Alcohol/week: 6 0 standard drinks     Types: 6 Cans of beer per week     Comment: week; social (as per allscripts)   • Drug use: No   • Sexual activity: Yes     Partners: Female   Other Topics Concern   • None   Social History Narrative   • None     Social Determinants of Health     Financial Resource Strain: Not on file   Food Insecurity: Not on file   Transportation Needs: Not on file   Physical Activity: Not on file   Stress: Not on file   Social Connections: Not on file   Intimate Partner Violence: Not on file   Housing Stability: Not on file      Current Medications:     Current Outpatient Medications   Medication Sig Dispense Refill   • dicyclomine (BENTYL) 20 mg tablet Take 1 tablet (20 mg total) by mouth every 6 (six) hours 60 tablet 3   • Eluxadoline 75 MG TABS Take 1 tablet (75 mg total) by mouth 2 (two) times a day 62 tablet 3   • mupirocin (BACTROBAN) 2 % ointment Apply topically 3 (three) times a day 22 g 0   • pantoprazole (PROTONIX) 40 mg tablet Take 1 tablet (40 mg total) by mouth daily 90 tablet 3     No current facility-administered medications for this visit  Allergies:     No Known Allergies   Physical Exam:     /88 (BP Location: Left arm, Patient Position: Sitting, Cuff Size: Standard)   Pulse 67   Temp (!) 96 9 °F (36 1 °C) (Tympanic)   Resp 20   Ht 6' 2" (1 88 m)   Wt 86 6 kg (191 lb)   SpO2 99%   BMI 24 52 kg/m²     Physical Exam  Vitals reviewed  Constitutional:       General: He is not in acute distress  Appearance: He is not ill-appearing or toxic-appearing  HENT:      Head: Normocephalic and atraumatic  Right Ear: Tympanic membrane, ear canal and external ear normal  There is no impacted cerumen  Left Ear: Tympanic membrane, ear canal and external ear normal  There is no impacted cerumen  Nose: Nose normal  No congestion or rhinorrhea  Mouth/Throat:      Mouth: Mucous membranes are moist       Pharynx: No oropharyngeal exudate or posterior oropharyngeal erythema  Eyes:      General: No scleral icterus  Right eye: No discharge  Left eye: No discharge  Extraocular Movements: Extraocular movements intact  Conjunctiva/sclera: Conjunctivae normal       Pupils: Pupils are equal, round, and reactive to light  Neck:      Vascular: No carotid bruit  Cardiovascular:      Rate and Rhythm: Normal rate and regular rhythm  Heart sounds: No murmur heard  Pulmonary:      Effort: Pulmonary effort is normal  No respiratory distress  Breath sounds: Normal breath sounds  No wheezing or rales  Abdominal:      General: Abdomen is flat  Bowel sounds are normal  There is no distension  Palpations: Abdomen is soft  Tenderness: There is no abdominal tenderness  Hernia: No hernia is present  Musculoskeletal:         General: No tenderness or deformity  Cervical back: Neck supple  No muscular tenderness  Right lower leg: No edema  Left lower leg: No edema  Lymphadenopathy:      Cervical: No cervical adenopathy  Skin:     General: Skin is warm and dry  Findings: No rash  Neurological:      General: No focal deficit present  Mental Status: He is alert and oriented to person, place, and time  Cranial Nerves: No cranial nerve deficit  Sensory: No sensory deficit  Motor: No weakness        Coordination: Coordination normal       Gait: Gait normal    Psychiatric:         Mood and Affect: Mood normal          Behavior: Behavior normal           Kulwant Quarles DO  MEDICAL [id number]

## 2022-10-14 ENCOUNTER — TELEPHONE (OUTPATIENT)
Dept: GASTROENTEROLOGY | Facility: CLINIC | Age: 57
End: 2022-10-14

## 2022-10-14 NOTE — TELEPHONE ENCOUNTER
----- Message from Maria Isabel Barba DO sent at 10/12/2022  5:41 PM EDT -----  Please call the patient in tone that he has a 3 mm abnormality in the gallbladder that either represents a stone or a polyp  It was a polyp, this polyp is not large enough to require removal   His next ultrasound should be performed 6 months from now

## 2022-10-14 NOTE — TELEPHONE ENCOUNTER
Called patient and gave test results  Patient is asking if there are any recommendations regarding Dr Stephanie Estrada finding between now and in 6 months when he is suggesting another US? Please advise  Thank you!

## 2022-11-10 ENCOUNTER — APPOINTMENT (OUTPATIENT)
Dept: LAB | Facility: HOSPITAL | Age: 57
End: 2022-11-10

## 2022-11-10 DIAGNOSIS — Z12.5 SCREENING FOR PROSTATE CANCER: ICD-10-CM

## 2022-11-10 DIAGNOSIS — R73.01 IMPAIRED FASTING GLUCOSE: ICD-10-CM

## 2022-11-10 DIAGNOSIS — Z00.00 ADULT GENERAL MEDICAL EXAMINATION: ICD-10-CM

## 2022-11-10 LAB
ANION GAP SERPL CALCULATED.3IONS-SCNC: 6 MMOL/L (ref 4–13)
BUN SERPL-MCNC: 14 MG/DL (ref 5–25)
CALCIUM SERPL-MCNC: 9.4 MG/DL (ref 8.3–10.1)
CHLORIDE SERPL-SCNC: 103 MMOL/L (ref 96–108)
CHOLEST SERPL-MCNC: 154 MG/DL
CO2 SERPL-SCNC: 29 MMOL/L (ref 21–32)
CREAT SERPL-MCNC: 1.13 MG/DL (ref 0.6–1.3)
ERYTHROCYTE [DISTWIDTH] IN BLOOD BY AUTOMATED COUNT: 10.9 % (ref 11.6–15.1)
EST. AVERAGE GLUCOSE BLD GHB EST-MCNC: 85 MG/DL
GFR SERPL CREATININE-BSD FRML MDRD: 70 ML/MIN/1.73SQ M
GLUCOSE P FAST SERPL-MCNC: 106 MG/DL (ref 65–99)
HBA1C MFR BLD: 4.6 %
HCT VFR BLD AUTO: 43.5 % (ref 36.5–49.3)
HDLC SERPL-MCNC: 88 MG/DL
HGB BLD-MCNC: 14.8 G/DL (ref 12–17)
LDLC SERPL CALC-MCNC: 38 MG/DL (ref 0–100)
MCH RBC QN AUTO: 36.5 PG (ref 26.8–34.3)
MCHC RBC AUTO-ENTMCNC: 34 G/DL (ref 31.4–37.4)
MCV RBC AUTO: 107 FL (ref 82–98)
PLATELET # BLD AUTO: 328 THOUSANDS/UL (ref 149–390)
PMV BLD AUTO: 9.4 FL (ref 8.9–12.7)
POTASSIUM SERPL-SCNC: 4.2 MMOL/L (ref 3.5–5.3)
PSA SERPL-MCNC: 0.4 NG/ML (ref 0–4)
RBC # BLD AUTO: 4.06 MILLION/UL (ref 3.88–5.62)
SODIUM SERPL-SCNC: 138 MMOL/L (ref 135–147)
TRIGL SERPL-MCNC: 139 MG/DL
WBC # BLD AUTO: 5.07 THOUSAND/UL (ref 4.31–10.16)

## 2022-11-11 ENCOUNTER — OFFICE VISIT (OUTPATIENT)
Dept: GASTROENTEROLOGY | Facility: CLINIC | Age: 57
End: 2022-11-11

## 2022-11-11 VITALS
BODY MASS INDEX: 24.97 KG/M2 | WEIGHT: 194.6 LBS | SYSTOLIC BLOOD PRESSURE: 130 MMHG | DIASTOLIC BLOOD PRESSURE: 86 MMHG | OXYGEN SATURATION: 99 % | HEART RATE: 66 BPM | HEIGHT: 74 IN

## 2022-11-11 DIAGNOSIS — R10.30 LOWER ABDOMINAL PAIN: Primary | ICD-10-CM

## 2022-11-11 DIAGNOSIS — K58.0 IRRITABLE BOWEL SYNDROME WITH DIARRHEA: ICD-10-CM

## 2022-11-11 NOTE — PROGRESS NOTES
[facility] - Outpatient Follow-up Note  Castro Bo 59 y o  male MRN: [id number]  Encounter: [id number]          ASSESSMENT AND PLAN:      1  Lower abdominal pain    2  Irritable bowel syndrome with diarrhea    3  Abnormal ultrasound    4  Right upper quadrant abdominal pain    Repeat ultrasound in 6 months    No additional diagnostic testing warranted at this time    Take Bentyl up to 4 times daily as needed for abdominal pain    Begin taking a fiber supplement twice daily in addition to his probiotic daily     ______________________________________________________________________    SUBJECTIVE:  Al comes the office today for routine follow-up  He states that he is experiencing right upper quadrant abdominal pain daily which is unprovoked by eating bowel movements  It is provoked by leaning forward or bending down  He denies any rectal bleeding, nausea, vomiting, heartburn  He takes his Bentyl twice daily along with a probiotic daily  He is currently not taking a fiber supplement  Recent ultrasound of the right upper quadrant demonstrated a 3 mm did abnormality which is either a stone or a polyp  There were recommending repeat ultrasound in 6 months  He occasionally experiences dry mouth with his Bentyl therapy  He denies any weakness or fatigue  He experiences diarrhea and lower abdominal pain approximately twice a week  REVIEW OF SYSTEMS IS OTHERWISE NEGATIVE        Historical Information   Past Medical History:   Diagnosis Date   • Gout    • Hypertension     resolved   • Mitral valve prolapse    • Neutropenia (HCC)    • Thrombocytosis     last assessed: 6/14/2016     Past Surgical History:   Procedure Laterality Date   • COLONOSCOPY  2021   • HAND SURGERY     • ORTHOPEDIC SURGERY Left     hand fracture repaired   • [place] CYSTO/URETERO W/LITHOTRIPSY &INDWELL STENT INSRT Right 6/21/2016    Procedure: CYSTOSCOPY; URETEROSCOPY;  RETROGRADE PYELOGRAM; STENT ;  Surgeon: Clay Larsen MD;  [facility];  Service: Urology   • SHOULDER ARTHROSCOPY W/ LABRAL REPAIR     • SHOULDER SURGERY       Social History   Social History     Substance and Sexual Activity   Alcohol Use Yes   • Alcohol/week: 6 0 standard drinks   • Types: 6 Cans of beer per week    Comment: week; social (as per allscripts)     Social History     Substance and Sexual Activity   Drug Use No     Social History     Tobacco Use   Smoking Status Former Smoker   • Packs/day: 0 03   • Years: 3 00   • Pack years: 0 09   • Types: Cigarettes   • Quit date: 1999   • Years since quittin 5   Smokeless Tobacco Never Used     Family History   Problem Relation Age of Onset   • Stomach cancer Father    • Hypertension Father         benign essential   • Diverticulosis Father    • Breast cancer Sister    • Diabetes Maternal Grandmother         mellitus   • Ovarian cancer Paternal Grandmother    • Ovarian cancer Child    • Breast cancer Maternal Aunt    • Lung cancer Paternal Uncle    • Pancreatic cancer Paternal Uncle    • Lung cancer Cousin        Meds/Allergies       Current Outpatient Medications:   •  dicyclomine (BENTYL) 20 mg tablet  •  Eluxadoline 75 MG TABS  •  mupirocin (BACTROBAN) 2 % ointment  •  pantoprazole (PROTONIX) 40 mg tablet    No Known Allergies        Objective     Blood pressure 130/86, pulse 66, height 6' 2" (1 88 m), weight 88 3 kg (194 lb 9 6 oz), SpO2 99 %  Body mass index is 24 99 kg/m²  PHYSICAL EXAM:      General Appearance:   Alert, cooperative, no distress   HEENT:   Normocephalic, atraumatic, anicteric      Neck:  Supple, symmetrical, trachea midline   Lungs:   Clear to auscultation bilaterally; no rales, rhonchi or wheezing; respirations unlabored    Heart[de-identified]   Regular rate and rhythm; no murmur, rub, or gallop     Abdomen:   Soft, tender in the right upper quadrant as well as in the left lower quadrant, non-distended; normal bowel sounds; no masses, no organomegaly  Genitalia:   Deferred    Rectal:   Deferred    Extremities:  No cyanosis, clubbing or edema    Pulses:  2+ and symmetric    Skin:  No jaundice, rashes, or lesions    Lymph nodes:  No palpable cervical lymphadenopathy        Lab Results:   No visits with results within 1 Day(s) from this visit  Latest known visit with results is:   Appointment on 11/10/2022   Component Date Value   • PSA, Diagnostic 11/10/2022 0 4    • Hemoglobin A1C 11/10/2022 4 6    • EAG 11/10/2022 85    • WBC 11/10/2022 5 07    • RBC 11/10/2022 4 06    • Hemoglobin 11/10/2022 14 8    • Hematocrit 11/10/2022 43 5    • MCV 11/10/2022 107 (A)   • MCH 11/10/2022 36 5 (A)   • MCHC 11/10/2022 34 0    • RDW 11/10/2022 10 9 (A)   • Platelets 93/89/0597 328    • MPV 11/10/2022 9 4    • Sodium 11/10/2022 138    • Potassium 11/10/2022 4 2    • Chloride 11/10/2022 103    • CO2 11/10/2022 29    • ANION GAP 11/10/2022 6    • BUN 11/10/2022 14    • Creatinine 11/10/2022 1 13    • Glucose, Fasting 11/10/2022 106 (A)   • Calcium 11/10/2022 9 4    • eGFR 11/10/2022 70    • Cholesterol 11/10/2022 154    • Triglycerides 11/10/2022 139    • HDL, Direct 11/10/2022 88    • LDL Calculated 11/10/2022 38          Radiology Results:   No results found

## 2022-11-14 ENCOUNTER — TELEPHONE (OUTPATIENT)
Dept: UROLOGY | Facility: CLINIC | Age: 57
End: 2022-11-14

## 2022-11-14 NOTE — TELEPHONE ENCOUNTER
Left message for patient about results, also asked for call back from patient to schedule 1 year follow up in February 2023  Urology number provided

## 2022-11-14 NOTE — TELEPHONE ENCOUNTER
----- Message from Ike Martinez PA-C sent at 11/14/2022 10:09 AM EST -----  PSA normal   Due for yearly follow-up in February

## 2022-11-28 DIAGNOSIS — K58.0 IRRITABLE BOWEL SYNDROME WITH DIARRHEA: ICD-10-CM

## 2022-11-28 RX ORDER — DICYCLOMINE HCL 20 MG
20 TABLET ORAL EVERY 6 HOURS
Qty: 60 TABLET | Refills: 3 | Status: SHIPPED | OUTPATIENT
Start: 2022-11-28

## 2023-02-22 DIAGNOSIS — K58.0 IRRITABLE BOWEL SYNDROME WITH DIARRHEA: ICD-10-CM

## 2023-02-22 RX ORDER — DICYCLOMINE HCL 20 MG
20 TABLET ORAL EVERY 6 HOURS
Qty: 60 TABLET | Refills: 3 | Status: SHIPPED | OUTPATIENT
Start: 2023-02-22

## 2023-06-13 ENCOUNTER — HOSPITAL ENCOUNTER (EMERGENCY)
Facility: HOSPITAL | Age: 58
Discharge: HOME | End: 2023-06-13
Attending: EMERGENCY MEDICINE | Admitting: EMERGENCY MEDICINE

## 2023-06-13 VITALS
DIASTOLIC BLOOD PRESSURE: 110 MMHG | SYSTOLIC BLOOD PRESSURE: 174 MMHG | HEART RATE: 90 BPM | OXYGEN SATURATION: 100 % | RESPIRATION RATE: 16 BRPM | TEMPERATURE: 98.4 F

## 2023-06-13 DIAGNOSIS — R03.0 ELEVATED BLOOD PRESSURE READING: ICD-10-CM
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
## 2023-06-13 DIAGNOSIS — S51.851A HUMAN BITE OF RIGHT FOREARM: Primary | ICD-10-CM

## 2023-06-13 DIAGNOSIS — Z23 NEED FOR TETANUS BOOSTER: ICD-10-CM

## 2023-06-13 PROCEDURE — 90715 TDAP VACCINE 7 YRS/> IM: CPT | Performed by: PHYSICIAN ASSISTANT

## 2023-06-13 RX ORDER — AMOXICILLIN AND CLAVULANATE POTASSIUM 875; 125 MG/1; MG/1
1 TABLET, FILM COATED ORAL ONCE
Status: COMPLETED | OUTPATIENT
Start: 2023-06-13 | End: 2023-06-13

## 2023-06-13 RX ORDER — GINSENG 100 MG
1 CAPSULE ORAL ONCE
Status: COMPLETED | OUTPATIENT
Start: 2023-06-13 | End: 2023-06-13

## 2023-06-13 RX ORDER — AMOXICILLIN AND CLAVULANATE POTASSIUM 875; 125 MG/1; MG/1
1 TABLET, FILM COATED ORAL EVERY 12 HOURS
Qty: 14 TABLET | Refills: 0 | Status: SHIPPED | OUTPATIENT
Start: 2023-06-13 | End: 2023-06-20

## 2023-06-13 RX ADMIN — BACITRACIN ZINC 1 SMALL APPLICATION: 500 OINTMENT TOPICAL at 22:11

## 2023-06-13 RX ADMIN — AMOXICILLIN AND CLAVULANATE POTASSIUM 1 TABLET: 875; 125 TABLET, FILM COATED ORAL at 22:11

## 2023-06-13 RX ADMIN — TETANUS TOXOID, REDUCED DIPHTHERIA TOXOID AND ACELLULAR PERTUSSIS VACCINE, ADSORBED 0.5 ML: 5; 2.5; 8; 8; 2.5 SUSPENSION INTRAMUSCULAR at 22:11
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
## 2023-11-01 ENCOUNTER — OFFICE VISIT (OUTPATIENT)
Dept: GASTROENTEROLOGY | Facility: CLINIC | Age: 58
End: 2023-11-01

## 2023-11-01 VITALS
HEART RATE: 67 BPM | OXYGEN SATURATION: 100 % | BODY MASS INDEX: 24 KG/M2 | WEIGHT: 187 LBS | SYSTOLIC BLOOD PRESSURE: 150 MMHG | DIASTOLIC BLOOD PRESSURE: 95 MMHG | HEIGHT: 74 IN

## 2023-11-01 DIAGNOSIS — R10.11 RIGHT UPPER QUADRANT ABDOMINAL PAIN: ICD-10-CM

## 2023-11-01 DIAGNOSIS — R10.31 RIGHT LOWER QUADRANT ABDOMINAL PAIN: Primary | ICD-10-CM

## 2023-11-01 DIAGNOSIS — K58.0 IRRITABLE BOWEL SYNDROME WITH DIARRHEA: ICD-10-CM

## 2023-11-01 DIAGNOSIS — K59.1 FUNCTIONAL DIARRHEA: ICD-10-CM

## 2023-11-01 PROCEDURE — 99214 OFFICE O/P EST MOD 30 MIN: CPT | Performed by: INTERNAL MEDICINE

## 2023-11-01 NOTE — PROGRESS NOTES
[facility] - Outpatient Follow-up Note  Justen Chin 59 y.o. male MRN: [id number]  Encounter: [id number]          ASSESSMENT AND PLAN:      1. Right lower quadrant abdominal pain    2. Right upper quadrant abdominal pain  - US right upper quadrant; Future  - Hepatic function panel; Future    3. Functional diarrhea  -Samples of Viberzi, 100 mg, have been given to the patient from our office supply since he does not have insurance at the present time he cannot afford this particular medication. 4. Irritable bowel syndrome with diarrhea    ______________________________________________________________________    SUBJECTIVE: Al comes the office today for routine follow-up. He is experiencing both right upper quadrant as well as right lower quadrant abdominal pains intermittently. These abdominal pains have been most noticeable in the last 3 weeks. He is also having upwards to 6 loose bowel movements or diarrhea bowel movements per day. He been taking his dicyclomine 20 mg 3 times a day every day and he states that this has helped relieve the right lower quadrant pain but is not helping the right upper quadrant pain. He has had problems with right upper quadrant abdominal pain in the past.  He had liver enzyme panel performed on July 5, 2022 which was normal.  He had an ultrasound that was performed October 12, 2022 which showed 2.3 cm focal echogenic lesion along the gallbladder wall and the differential diagnosis includes gallbladder wall folding, a gallbladder polyp, or an adherent calculus. They had recommended follow-up ultrasound in 6 months for better characterization. He denies any rectal bleeding or melena. He denies nausea or vomiting, heartburn, bloating but he does admit to gaseousness. REVIEW OF SYSTEMS IS OTHERWISE NEGATIVE.       Historical Information   Past Medical History:   Diagnosis Date    Gout     Hypertension     resolved    Mitral valve prolapse Neutropenia ([address])     Thrombocytosis     last assessed: 2016     Past Surgical History:   Procedure Laterality Date    COLONOSCOPY      HAND SURGERY      ORTHOPEDIC SURGERY Left     hand fracture repaired    [place] CYSTO/URETERO W/LITHOTRIPSY &INDWELL STENT INSRT Right 2016    Procedure: CYSTOSCOPY; URETEROSCOPY;  RETROGRADE PYELOGRAM; STENT ;  Surgeon: Darío Boss MD;  [facility];  Service: Urology    SHOULDER ARTHROSCOPY W/ LABRAL REPAIR      SHOULDER SURGERY       Social History   Social History     Substance and Sexual Activity   Alcohol Use Yes    Alcohol/week: 6.0 standard drinks of alcohol    Types: 6 Cans of beer per week    Comment: week; social (as per allscripts)     Social History     Substance and Sexual Activity   Drug Use No     Social History     Tobacco Use   Smoking Status Former    Packs/day: 0.03    Years: 3.00    Total pack years: 0.09    Types: Cigarettes    Quit date: 1999    Years since quittin.5   Smokeless Tobacco Never     Family History   Problem Relation Age of Onset    Stomach cancer Father     Hypertension Father         benign essential    Diverticulosis Father     Breast cancer Sister     Diabetes Maternal Grandmother         mellitus    Ovarian cancer Paternal Grandmother     Ovarian cancer Child     Breast cancer Maternal Aunt     Lung cancer Paternal Uncle     Pancreatic cancer Paternal Uncle     Lung cancer Cousin        Meds/Allergies       Current Outpatient Medications:     dicyclomine (BENTYL) 20 mg tablet    pantoprazole (PROTONIX) 40 mg tablet    Eluxadoline 75 MG TABS    No Known Allergies        Objective     Blood pressure 150/95, pulse 67, height 6' 2" (1.88 m), weight 84.8 kg (187 lb), SpO2 100 %. Body mass index is 24.01 kg/m². PHYSICAL EXAM:      General Appearance:   Alert, cooperative, no distress   HEENT:   Normocephalic, atraumatic, anicteric.      Neck:  Supple, symmetrical, trachea midline   Lungs:   Clear to auscultation bilaterally; no rales, rhonchi or wheezing; respirations unlabored    Heart[de-identified]   Regular rate and rhythm; no murmur, rub, or gallop. Abdomen:   Soft, non-tender, non-distended; normal bowel sounds; no masses, no organomegaly    Genitalia:   Deferred    Rectal:   Deferred    Extremities:  No cyanosis, clubbing or edema    Pulses:  2+ and symmetric    Skin:  No jaundice, rashes, or lesions    Lymph nodes:  No palpable cervical lymphadenopathy        Lab Results:   No visits with results within 1 Day(s) from this visit. Latest known visit with results is:   Appointment on 11/10/2022   Component Date Value    PSA, Diagnostic 11/10/2022 0.4     Hemoglobin A1C 11/10/2022 4.6     EAG 11/10/2022 85     WBC 11/10/2022 5.07     RBC 11/10/2022 4.06     Hemoglobin 11/10/2022 14.8     Hematocrit 11/10/2022 43.5     MCV 11/10/2022 107 (H)     MCH 11/10/2022 36.5 (H)     MCHC 11/10/2022 34.0     RDW 11/10/2022 10.9 (L)     Platelets 28/78/3397 328     MPV 11/10/2022 9.4     Sodium 11/10/2022 138     Potassium 11/10/2022 4.2     Chloride 11/10/2022 103     CO2 11/10/2022 29     ANION GAP 11/10/2022 6     BUN 11/10/2022 14     Creatinine 11/10/2022 1.13     Glucose, Fasting 11/10/2022 106 (H)     Calcium 11/10/2022 9.4     eGFR 11/10/2022 70     Cholesterol 11/10/2022 154     Triglycerides 11/10/2022 139     HDL, Direct 11/10/2022 88     LDL Calculated 11/10/2022 38          Radiology Results:   No results found.

## 2023-11-09 ENCOUNTER — APPOINTMENT (OUTPATIENT)
Dept: LAB | Facility: HOSPITAL | Age: 58
End: 2023-11-09
Attending: INTERNAL MEDICINE

## 2023-11-09 DIAGNOSIS — R10.11 RIGHT UPPER QUADRANT ABDOMINAL PAIN: ICD-10-CM

## 2023-11-09 LAB
ALBUMIN SERPL BCP-MCNC: 4.2 G/DL (ref 3.5–5)
ALP SERPL-CCNC: 51 U/L (ref 34–104)
ALT SERPL W P-5'-P-CCNC: 18 U/L (ref 7–52)
AST SERPL W P-5'-P-CCNC: 20 U/L (ref 13–39)
BILIRUB DIRECT SERPL-MCNC: 0.12 MG/DL (ref 0–0.2)
BILIRUB SERPL-MCNC: 0.51 MG/DL (ref 0.2–1)
PROT SERPL-MCNC: 6.5 G/DL (ref 6.4–8.4)

## 2023-11-09 PROCEDURE — 36415 COLL VENOUS BLD VENIPUNCTURE: CPT

## 2023-11-09 PROCEDURE — 80076 HEPATIC FUNCTION PANEL: CPT

## 2023-11-10 ENCOUNTER — TELEPHONE (OUTPATIENT)
Dept: GASTROENTEROLOGY | Facility: CLINIC | Age: 58
End: 2023-11-10

## 2023-11-10 NOTE — TELEPHONE ENCOUNTER
----- Message from Naye Celis DO sent at 11/10/2023  7:22 AM EST -----  The results were given to the patient's MyChart.

## 2023-11-10 NOTE — TELEPHONE ENCOUNTER
Called and spoke to patient . Gave patient test results.  Patient voiced understanding and had no further questions or concerns

## 2023-11-13 ENCOUNTER — HOSPITAL ENCOUNTER (OUTPATIENT)
Dept: ULTRASOUND IMAGING | Facility: CLINIC | Age: 58
Discharge: HOME | End: 2023-11-13

## 2023-11-13 DIAGNOSIS — R10.11 RIGHT UPPER QUADRANT ABDOMINAL PAIN: ICD-10-CM

## 2023-11-13 PROCEDURE — 76705 ECHO EXAM OF ABDOMEN: CPT

## 2024-03-08 ENCOUNTER — OFFICE VISIT (OUTPATIENT)
Dept: GASTROENTEROLOGY | Facility: CLINIC | Age: 59
End: 2024-03-08

## 2024-03-08 VITALS
HEIGHT: 74 IN | DIASTOLIC BLOOD PRESSURE: 90 MMHG | OXYGEN SATURATION: 99 % | WEIGHT: 194 LBS | SYSTOLIC BLOOD PRESSURE: 140 MMHG | TEMPERATURE: 97.8 F | BODY MASS INDEX: 24.9 KG/M2 | HEART RATE: 78 BPM

## 2024-03-08 DIAGNOSIS — R10.31 RIGHT LOWER QUADRANT ABDOMINAL PAIN: Primary | ICD-10-CM

## 2024-03-08 DIAGNOSIS — K58.0 IRRITABLE BOWEL SYNDROME WITH DIARRHEA: ICD-10-CM

## 2024-03-08 PROCEDURE — 99214 OFFICE O/P EST MOD 30 MIN: CPT | Performed by: INTERNAL MEDICINE

## 2024-03-08 RX ORDER — DICYCLOMINE HCL 20 MG
20 TABLET ORAL EVERY 6 HOURS
Qty: 124 TABLET | Refills: 6 | Status: SHIPPED | OUTPATIENT
Start: 2024-03-08

## 2024-03-08 NOTE — PROGRESS NOTES
[facility] - Outpatient Follow-up Note  Yrn Munroe 58 y.o. male MRN: [id number]  Encounter: [id number]          ASSESSMENT AND PLAN:      1. Right lower quadrant abdominal pain  - dicyclomine (BENTYL) 20 mg tablet; Take 1 tablet (20 mg total) by mouth every 6 (six) hours  Dispense: 124 tablet; Refill: 6    2. Irritable bowel syndrome with diarrhea  - dicyclomine (BENTYL) 20 mg tablet; Take 1 tablet (20 mg total) by mouth every 6 (six) hours  Dispense: 124 tablet; Refill: 6    Once the patient obtains health insurance, I would like to perform an CT scan of the abdomen although I expect with a 2-year history of right lower quadrant abdominal pain in the setting of irritable bowel syndrome, the likelihood of finding a distinct abnormality is probably low    ______________________________________________________________________    SUBJECTIVE: DESMOND comes to the office today for routine follow-up.  Is a 58-year-old male with a history of irritable bowel syndrome which is predominantly diarrhea.  He also has diverticulosis.  He was on the varices which did help with the diarrhea although it did not help with the abdominal pain.  He states he needs a refill of this medication.  He has had the abdominal pain in the right lower quadrant for nearly 2 years.  It feels like he is being punched or like he is on the thousand sit ups.  Dicyclomine helps with his bowel movement but generally does not improve the abdominal pain even though he is taking it 4 times a day.  The pain in his right lower quadrant seems to be worse at night toward the end of the day and improved in the morning time.      REVIEW OF SYSTEMS IS OTHERWISE NEGATIVE.      Historical Information   Past Medical History:   Diagnosis Date    Gout     Hypertension     resolved    Mitral valve prolapse     Neutropenia (HCC)     Thrombocytosis     last assessed: 6/14/2016     Past Surgical History:   Procedure Laterality Date    COLONOSCOPY   "    HAND SURGERY      ORTHOPEDIC SURGERY Left     hand fracture repaired    [place] CYSTO/URETERO W/LITHOTRIPSY &INDWELL STENT INSRT Right 2016    Procedure: CYSTOSCOPY; URETEROSCOPY;  RETROGRADE PYELOGRAM; STENT ;  Surgeon: Deep Weiss MD;  [facility];  Service: Urology    SHOULDER ARTHROSCOPY W/ LABRAL REPAIR      SHOULDER SURGERY       Social History   Social History     Substance and Sexual Activity   Alcohol Use Yes    Alcohol/week: 6.0 standard drinks of alcohol    Types: 6 Cans of beer per week    Comment: week; social (as per allscripts)     Social History     Substance and Sexual Activity   Drug Use No     Social History     Tobacco Use   Smoking Status Former    Current packs/day: 0.00    Average packs/day: (0.1 ttl pk-yrs)    Types: Cigarettes    Start date: 1996    Quit date: 1999    Years since quittin.8   Smokeless Tobacco Never     Family History   Problem Relation Age of Onset    Stomach cancer Father     Hypertension Father         benign essential    Diverticulosis Father     Breast cancer Sister     Diabetes Maternal Grandmother         mellitus    Ovarian cancer Paternal Grandmother     Ovarian cancer Child     Breast cancer Maternal Aunt     Lung cancer Paternal Uncle     Pancreatic cancer Paternal Uncle     Lung cancer Cousin        Meds/Allergies       Current Outpatient Medications:     dicyclomine (BENTYL) 20 mg tablet    dicyclomine (BENTYL) 20 mg tablet    pantoprazole (PROTONIX) 40 mg tablet    Eluxadoline 75 MG TABS    No Known Allergies        Objective     Blood pressure 140/90, pulse 78, temperature 97.8 °F (36.6 °C), height 6' 2\" (1.88 m), weight 88 kg (194 lb), SpO2 99%. Body mass index is 24.91 kg/m².      PHYSICAL EXAM:      General Appearance:   Alert, cooperative, no distress   HEENT:   Normocephalic, atraumatic, anicteric.     Neck:  Supple, symmetrical, trachea midline   Lungs:   Clear to auscultation bilaterally; no rales, rhonchi or " wheezing; respirations unlabored    Heart::   Regular rate and rhythm; no murmur, rub, or gallop.   Abdomen:   Soft, non-tender, non-distended; normal bowel sounds; no masses, no organomegaly    Genitalia:   Deferred    Rectal:   Deferred    Extremities:  No cyanosis, clubbing or edema    Pulses:  2+ and symmetric    Skin:  No jaundice, rashes, or lesions    Lymph nodes:  No palpable cervical lymphadenopathy        Lab Results:   No visits with results within 1 Day(s) from this visit.   Latest known visit with results is:   Appointment on 11/09/2023   Component Date Value    Total Bilirubin 11/09/2023 0.51     Bilirubin, Direct 11/09/2023 0.12     Alkaline Phosphatase 11/09/2023 51     AST 11/09/2023 20     ALT 11/09/2023 18     Total Protein 11/09/2023 6.5     Albumin 11/09/2023 4.2          Radiology Results:   No results found.

## 2024-04-10 ENCOUNTER — OFFICE VISIT (OUTPATIENT)
Age: 59
End: 2024-04-10

## 2024-04-10 VITALS
HEART RATE: 69 BPM | BODY MASS INDEX: 24.26 KG/M2 | TEMPERATURE: 96.8 F | WEIGHT: 189 LBS | SYSTOLIC BLOOD PRESSURE: 128 MMHG | RESPIRATION RATE: 18 BRPM | OXYGEN SATURATION: 98 % | HEIGHT: 74 IN | DIASTOLIC BLOOD PRESSURE: 84 MMHG

## 2024-04-10 DIAGNOSIS — H61.22 IMPACTED CERUMEN OF LEFT EAR: ICD-10-CM

## 2024-04-10 DIAGNOSIS — H65.02 NON-RECURRENT ACUTE SEROUS OTITIS MEDIA OF LEFT EAR: Primary | ICD-10-CM

## 2024-04-10 RX ORDER — FLUTICASONE PROPIONATE 50 MCG
1 SPRAY, SUSPENSION (ML) NASAL DAILY
Qty: 9.9 ML | Refills: 0 | Status: SHIPPED | OUTPATIENT
Start: 2024-04-10 | End: 2024-04-24

## 2024-04-10 RX ORDER — AMOXICILLIN AND CLAVULANATE POTASSIUM 875; 125 MG/1; MG/1
1 TABLET, FILM COATED ORAL EVERY 12 HOURS SCHEDULED
Qty: 10 TABLET | Refills: 0 | Status: SHIPPED | OUTPATIENT
Start: 2024-04-10 | End: 2024-04-15

## 2024-04-10 NOTE — PROGRESS NOTES
"Name: Yrn Munroe      : 1965      MRN: [id number]  Encounter Provider: Sylvia Schilling PA-C  Encounter Date: 4/10/2024   Encounter department: [facility] [place]    Assessment & Plan     1. Non-recurrent acute serous otitis media of left ear  Comments:  Augmentin BID for 5 days.  Flonase one spray each nostril once a day for 2-4 weeks.  Return if not improving.  Orders:  -     amoxicillin-clavulanate (AUGMENTIN) 875-125 mg per tablet; Take 1 tablet by mouth every 12 (twelve) hours for 5 days  -     fluticasone (FLONASE) 50 mcg/act nasal spray; 1 spray into each nostril daily for 14 days    2. Impacted cerumen of left ear  Comments:  Pt tolerated irrigation well, improvement in hearing.  Otitis media noted after irrigation.  Use Debrox as needed, avoid putting q-tip in ear.           Subjective      Earache   There is pain in the left ear. This is a recurrent problem. The current episode started in the past 7 days. The problem occurs constantly. The problem has been unchanged. There has been no fever. The pain is at a severity of 7/10. Associated symptoms include headaches and hearing loss. Pertinent negatives include no abdominal pain, coughing, diarrhea, ear discharge, neck pain, rash, rhinorrhea, sore throat or vomiting.       Pt is here for a clogged L ear.  He has left-side nasal congestion as well.  Pt has used Debrox 2 x but stopped because it didn't seem to help. Denies fevers, sore throat.  He generally doesn't have issues with cerumen impaction.  He feels muffled and his voice is loud.  Denies pain currently in his ear.  No issues with Right ear.    Reports the side of his jaw feels \"numb\", but no issues with eating, drinking, swallowing.    Review of Systems   HENT:  Positive for ear pain and hearing loss. Negative for ear discharge, rhinorrhea and sore throat.    Respiratory:  Negative for cough.    Gastrointestinal:  Negative for abdominal pain, diarrhea and vomiting. " "  Musculoskeletal:  Negative for neck pain.   Skin:  Negative for rash.   Neurological:  Positive for headaches.       Current Outpatient Medications on File Prior to Visit   Medication Sig    dicyclomine (BENTYL) 20 mg tablet Take 1 tablet (20 mg total) by mouth every 6 (six) hours    Eluxadoline 75 MG TABS Take 1 tablet (75 mg total) by mouth 2 (two) times a day    pantoprazole (PROTONIX) 40 mg tablet Take 1 tablet (40 mg total) by mouth daily    dicyclomine (BENTYL) 20 mg tablet Take 1 tablet (20 mg total) by mouth every 6 (six) hours (Patient not taking: Reported on 4/10/2024)       Objective     /84 (BP Location: Left arm, Patient Position: Sitting, Cuff Size: Standard)   Pulse 69   Temp (!) 96.8 °F (36 °C) (Tympanic)   Resp 18   Ht 6' 2\" (1.88 m)   Wt 85.7 kg (189 lb)   SpO2 98%   BMI 24.27 kg/m²     Physical Exam  Constitutional:       Appearance: Normal appearance. He is not toxic-appearing or diaphoretic.   HENT:      Head: Normocephalic and atraumatic.      Right Ear: Hearing, tympanic membrane, ear canal and external ear normal.      Left Ear: Hearing and external ear normal. A middle ear effusion is present. There is impacted cerumen. No mastoid tenderness. No hemotympanum. Tympanic membrane is erythematous. Tympanic membrane is not perforated.      Ears:      Comments: Impacted cerumen noted, once this was flushed out the TM was noted to be erythematous with effusion present.     Nose: Nose normal.      Right Turbinates: Enlarged and swollen.      Left Turbinates: Enlarged and swollen.      Mouth/Throat:      Lips: Pink.      Mouth: Mucous membranes are moist.      Tongue: No lesions.      Pharynx: Oropharynx is clear.   Eyes:      General: Lids are normal. Vision grossly intact.      Conjunctiva/sclera: Conjunctivae normal.      Pupils: Pupils are equal, round, and reactive to light.   Cardiovascular:      Rate and Rhythm: Normal rate.   Pulmonary:      Effort: Pulmonary effort is normal. " "  Musculoskeletal:      Cervical back: Full passive range of motion without pain.   Lymphadenopathy:      Cervical: No cervical adenopathy.   Skin:     General: Skin is warm and dry.      Capillary Refill: Capillary refill takes less than 2 seconds.   Neurological:      General: No focal deficit present.      Mental Status: He is alert.   Psychiatric:         Mood and Affect: Mood normal.       Ear cerumen removal    Date/Time: 4/10/2024 9:00 AM    Performed by: Sylvia Schilling PA-C  Authorized by: Sylvia Schilling PA-C  Universal Protocol:  Consent: Verbal consent obtained.  Consent given by: patient  Time out: Immediately prior to procedure a \"time out\" was called to verify the correct patient, procedure, equipment, support staff and site/side marked as required.  Patient understanding: patient states understanding of the procedure being performed  Patient consent: the patient's understanding of the procedure matches consent given  Procedure consent: procedure consent matches procedure scheduled  Patient identity confirmed: verbally with patient    Patient location:  Clinic  Procedure details:     Local anesthetic:  None    Location:  L ear    Procedure type: irrigation only      Approach:  External  Post-procedure details:     Complication:  None    Hearing quality:  Improved    Patient tolerance of procedure:  Tolerated well, no immediate complications        Sylvia Schilling PA-C    "

## 2024-07-09 ENCOUNTER — TELEPHONE (OUTPATIENT)
Dept: GASTROENTEROLOGY | Facility: CLINIC | Age: 59
End: 2024-07-09

## 2024-07-09 NOTE — TELEPHONE ENCOUNTER
Left message for patient to confirm appointment on 7/10 and for updated insurance information also left information if he has no insurance coverage there will be a $30 deposit due at time of service

## 2024-07-10 ENCOUNTER — OFFICE VISIT (OUTPATIENT)
Dept: GASTROENTEROLOGY | Facility: CLINIC | Age: 59
End: 2024-07-10

## 2024-07-10 VITALS
OXYGEN SATURATION: 98 % | TEMPERATURE: 95.8 F | WEIGHT: 182.8 LBS | HEART RATE: 76 BPM | DIASTOLIC BLOOD PRESSURE: 104 MMHG | SYSTOLIC BLOOD PRESSURE: 149 MMHG | BODY MASS INDEX: 23.46 KG/M2 | HEIGHT: 74 IN

## 2024-07-10 DIAGNOSIS — K21.9 GASTROESOPHAGEAL REFLUX DISEASE WITHOUT ESOPHAGITIS: Primary | ICD-10-CM

## 2024-07-10 DIAGNOSIS — K58.0 IRRITABLE BOWEL SYNDROME WITH DIARRHEA: ICD-10-CM

## 2024-07-10 DIAGNOSIS — R63.4 WEIGHT LOSS, ABNORMAL: ICD-10-CM

## 2024-07-10 PROCEDURE — 99214 OFFICE O/P EST MOD 30 MIN: CPT | Performed by: INTERNAL MEDICINE

## 2024-07-10 RX ORDER — PANTOPRAZOLE SODIUM 40 MG/1
40 TABLET, DELAYED RELEASE ORAL DAILY
Qty: 31 TABLET | Refills: 11 | Status: SHIPPED | OUTPATIENT
Start: 2024-07-10

## 2024-07-10 RX ORDER — DICYCLOMINE HCL 20 MG
20 TABLET ORAL EVERY 6 HOURS
Qty: 60 TABLET | Refills: 11 | Status: SHIPPED | OUTPATIENT
Start: 2024-07-10

## 2024-07-10 NOTE — PROGRESS NOTES
[facility] - Outpatient Follow-up Note  Yrn Munroe 58 y.o. male MRN: [id number]  Encounter: [id number]          ASSESSMENT AND PLAN:      1. Gastroesophageal reflux disease without esophagitis  - pantoprazole (PROTONIX) 40 mg tablet; Take 1 tablet (40 mg total) by mouth daily  Dispense: 31 tablet; Refill: 11    2. Irritable bowel syndrome with diarrhea  - dicyclomine (BENTYL) 20 mg tablet; Take 1 tablet (20 mg total) by mouth every 6 (six) hours  Dispense: 60 tablet; Refill: 11  -Continue probiotic daily    3. Weight loss, abnormal  -I would recommend that he increase his dietary intake to 2 full meals per day in addition to taking supplementation daily  -No indication for gastrointestinal workup at this time  -Etiology is most likely related to decreased calorie consumption compared to a significantly increased calorie expenditure on a daily basis    ______________________________________________________________________    SUBJECTIVE: Al comes to the office today for a 4-month follow-up.  He states that he does require refill of his medications.  He will need a refill of dicyclomine which she is taking at least once daily as well as refill on his pantoprazole which she is taking once daily as well.  He admits to episodes of occasional heartburn but denies dysphagia or odynophagia.  There is occasional episodes of abdominal pain.  There is no rectal bleeding or melena.  He denies nausea or vomiting.  He states that he is eating 1 meal a day in general and sometimes 2 meals a day.  He is biking on a daily basis.  He is also very active on a daily basis.  He has occasional episodes of diarrhea.  Overall his irritable bowel syndrome is under good control.  He is using a probiotic daily in the form of yogurt, live culture.  He has been complaining of this weight loss.  In 2022 his weight was 194 pounds in November 2023 his weight was down to 187 pounds and today's weight is 182.8  pounds.      REVIEW OF SYSTEMS IS OTHERWISE NEGATIVE.      Historical Information   Past Medical History:   Diagnosis Date    Gout     Hypertension     resolved    Mitral valve prolapse     Neutropenia (HCC)     Thrombocytosis     last assessed: 2016     Past Surgical History:   Procedure Laterality Date    COLONOSCOPY      HAND SURGERY      ORTHOPEDIC SURGERY Left     hand fracture repaired    CT CYSTO/URETERO W/LITHOTRIPSY &INDWELL STENT INSRT Right 2016    Procedure: CYSTOSCOPY; URETEROSCOPY;  RETROGRADE PYELOGRAM; STENT ;  Surgeon: Deep Weiss MD;  Location: [facility];  Service: Urology    SHOULDER ARTHROSCOPY W/ LABRAL REPAIR      SHOULDER SURGERY       Social History   Social History     Substance and Sexual Activity   Alcohol Use Yes    Alcohol/week: 6.0 standard drinks of alcohol    Types: 6 Cans of beer per week    Comment: week; social (as per allscripts)     Social History     Substance and Sexual Activity   Drug Use No     Social History     Tobacco Use   Smoking Status Former    Current packs/day: 0.00    Average packs/day: (0.1 ttl pk-yrs)    Types: Cigarettes    Start date: 1996    Quit date: 1999    Years since quittin.2   Smokeless Tobacco Never     Family History   Problem Relation Age of Onset    Stomach cancer Father     Hypertension Father         benign essential    Diverticulosis Father     Breast cancer Sister     Diabetes Maternal Grandmother         mellitus    Ovarian cancer Paternal Grandmother     Ovarian cancer Child     Breast cancer Maternal Aunt     Lung cancer Paternal Uncle     Pancreatic cancer Paternal Uncle     Lung cancer Cousin        Meds/Allergies       Current Outpatient Medications:     dicyclomine (BENTYL) 20 mg tablet    dicyclomine (BENTYL) 20 mg tablet    dicyclomine (BENTYL) 20 mg tablet    Eluxadoline 75 MG TABS    fluticasone (FLONASE) 50 mcg/act nasal spray    pantoprazole (PROTONIX) 40 mg tablet    pantoprazole (PROTONIX) 40  "mg tablet    No Known Allergies        Objective     Blood pressure (!) 149/104, pulse 76, temperature (!) 95.8 °F (35.4 °C), temperature source Temporal, height 6' 2\" (1.88 m), weight 82.9 kg (182 lb 12.8 oz), SpO2 98%. Body mass index is 23.47 kg/m².      PHYSICAL EXAM:      General Appearance:   Alert, cooperative, no distress   HEENT:   Normocephalic, atraumatic, anicteric.     Neck:  Supple, symmetrical, trachea midline   Lungs:   Clear to auscultation bilaterally; no rales, rhonchi or wheezing; respirations unlabored    Heart::   Regular rate and rhythm; no murmur, rub, or gallop.   Abdomen:   Soft, non-tender, non-distended; normal bowel sounds; no masses, no organomegaly    Genitalia:   Deferred    Rectal:   Deferred    Extremities:  No cyanosis, clubbing or edema    Pulses:  2+ and symmetric    Skin:  No jaundice, rashes, or lesions    Lymph nodes:  No palpable cervical lymphadenopathy        Lab Results:   No visits with results within 1 Day(s) from this visit.   Latest known visit with results is:   Appointment on 11/09/2023   Component Date Value    Total Bilirubin 11/09/2023 0.51     Bilirubin, Direct 11/09/2023 0.12     Alkaline Phosphatase 11/09/2023 51     AST 11/09/2023 20     ALT 11/09/2023 18     Total Protein 11/09/2023 6.5     Albumin 11/09/2023 4.2          Radiology Results:   No results found.  "

## 2024-08-15 DIAGNOSIS — K58.0 IRRITABLE BOWEL SYNDROME WITH DIARRHEA: ICD-10-CM

## 2024-08-15 RX ORDER — DICYCLOMINE HCL 20 MG
20 TABLET ORAL EVERY 6 HOURS
Qty: 60 TABLET | Refills: 5 | Status: SHIPPED | OUTPATIENT
Start: 2024-08-15

## 2024-12-09 ENCOUNTER — OFFICE VISIT (OUTPATIENT)
Age: 59
End: 2024-12-09

## 2024-12-09 VITALS
HEART RATE: 93 BPM | WEIGHT: 183.2 LBS | BODY MASS INDEX: 23.51 KG/M2 | OXYGEN SATURATION: 98 % | HEIGHT: 74 IN | RESPIRATION RATE: 16 BRPM | TEMPERATURE: 97.8 F | DIASTOLIC BLOOD PRESSURE: 80 MMHG | SYSTOLIC BLOOD PRESSURE: 130 MMHG

## 2024-12-09 DIAGNOSIS — I10 PRIMARY HYPERTENSION: Primary | ICD-10-CM

## 2024-12-09 DIAGNOSIS — R73.9 ELEVATED SERUM GLUCOSE: ICD-10-CM

## 2024-12-09 DIAGNOSIS — Z12.5 PROSTATE CANCER SCREENING: ICD-10-CM

## 2024-12-09 DIAGNOSIS — G44.011 INTRACTABLE EPISODIC CLUSTER HEADACHE: ICD-10-CM

## 2024-12-09 DIAGNOSIS — E55.9 VITAMIN D INSUFFICIENCY: ICD-10-CM

## 2024-12-09 DIAGNOSIS — Z13.6 ENCOUNTER FOR LIPID SCREENING FOR CARDIOVASCULAR DISEASE: ICD-10-CM

## 2024-12-09 DIAGNOSIS — Z13.220 ENCOUNTER FOR LIPID SCREENING FOR CARDIOVASCULAR DISEASE: ICD-10-CM

## 2024-12-09 PROCEDURE — 99214 OFFICE O/P EST MOD 30 MIN: CPT | Performed by: FAMILY MEDICINE

## 2024-12-09 RX ORDER — SUMATRIPTAN 50 MG/1
TABLET, FILM COATED ORAL
Qty: 10 TABLET | Refills: 5 | Status: SHIPPED | OUTPATIENT
Start: 2024-12-09

## 2024-12-09 RX ORDER — VERAPAMIL HYDROCHLORIDE 40 MG/1
40 TABLET ORAL 3 TIMES DAILY
Qty: 300 TABLET | Refills: 1 | Status: SHIPPED | OUTPATIENT
Start: 2024-12-09

## 2024-12-09 NOTE — PATIENT INSTRUCTIONS
"Patient Education     Checking your blood pressure at home   The Basics   Written by the doctors and editors at [facility]   How is blood pressure measured? -- It is usually measured with a device that goes around the upper arm. This is called a \"blood pressure cuff.\" This is often done in a doctor's office. But some people also check their blood pressure themselves, at home or at work. Doctors call this \"self-measured blood pressure monitoring.\"  Blood pressure is explained with 2 numbers. For instance, your blood pressure might be \"140 over 90.\" The first (top) number is the pressure inside your arteries when your heart is warner. The second (bottom) number is the pressure inside your arteries when your heart is relaxed. The table shows how doctors and nurses define high and normal blood pressure (table 1).  If your blood pressure gets too high, it puts you at risk for heart attack, stroke, and kidney disease. High blood pressure does not usually cause symptoms. But it can be serious.  What is a home blood pressure meter? -- A home blood pressure meter (or \"monitor\") is a device you can use to check your blood pressure yourself. It has a cuff that goes around your upper arm (figure 1). Some devices have a cuff that goes around your wrist instead. But doctors aren't sure if these work as well. The meter also has a small screen, or dial, that shows your blood pressure numbers.  There are also special meters you can wear for a day or 2. These are different because they automatically check your blood pressure throughout the day and night, even while you are sleeping. If your doctor thinks that you should use one of these devices, they will tell you how to wear it.  Why do I need to check my blood pressure myself? -- If your doctor knows or suspects that you have high blood pressure, they might want you to check it at home. There are a few reasons for this. Your doctor might want to look at:   Whether your blood " pressure measures the same at home as it did in the doctor's office   How well your blood pressure medicines are working   Changes in your blood pressure, for example, if it goes up and down  People who check their own blood pressure usually do better at keeping it low.  How do I choose a home blood pressure meter? -- When choosing a home blood pressure meter, you will probably want to think about:   Cost - Some devices cost more than others. You should also check to see if your insurance will help pay for your device.   Size - It's important to make sure that the cuff fits your arm comfortably. Your doctor or nurse can help you with this.   How easy it is to use - Make sure that you understand how to use the device. You also need to be able to read the numbers on the screen.  You do not need a prescription to buy a home blood pressure meter. You can buy them at most pharmacies or online. Your doctor or nurse can help you choose the right device for you. They should also check your device about once every year to make sure that it is working correctly.  How do I check my blood pressure at home? -- Once you have a home blood pressure meter, your doctor or nurse should check it to make sure that it fits you and works correctly.  When it's time to check your blood pressure:   Go to the bathroom and empty your bladder first. Having a full bladder can temporarily increase your blood pressure, making the results inaccurate.   Sit in a chair with your feet flat on the ground.   Try to breathe normally and stay calm.   Attach the cuff to your arm. Place the cuff directly on your skin, not over your clothing. The cuff should be tight enough to not slip down, but not uncomfortably tight.   Sit and relax for about 3 to 5 minutes with the cuff on.   Follow the directions that came with your device to start measuring your blood pressure. This might involve squeezing the bulb at the end of the tube to inflate the cuff (fill it  with air). With some monitors, you press a button to inflate the cuff. When the cuff fills with air, it feels like someone is squeezing your arm, but it should not hurt. Then, you will slowly deflate the cuff (let the air out of it), or it will deflate by itself. The screen or dial will show your blood pressure numbers.   Stay seated and relax for 1 minute, then measure your blood pressure again.  How often should I check my blood pressure? -- It depends. Different people need to follow different schedules. Your doctor or nurse will tell you how often to check your blood pressure, and when. Some people need to check their blood pressure twice a day, in the morning and evening.  Your doctor or nurse will probably tell you to keep track of your blood pressure for at least a few days (table 2). Then, they will look at the numbers. This is because it's normal for your blood pressure to change a bit from day to day. For example, the numbers might change depending on whether you recently had caffeine, just exercised, or feel stressed. Checking your blood pressure over several days, or longer, gives your doctor or nurse a better idea of what is average for you.  How should I keep track of my blood pressure? -- Some blood pressure meters will record your numbers for you, or send them to your computer or smartphone. If yours does not do this, you need to write them down. Your doctor or nurse can help you figure out the best way to keep track of the numbers.  What if my blood pressure is high? -- Your doctor or nurse will tell you what to do if your blood pressure is high when you check it at home. If you get a number that is higher than normal, measure it again to see if it is still high. If it is very high (above a certain number, which your doctor or nurse will tell you to watch out for), call your doctor right away.  If your blood pressure is only a little high, your doctor or nurse might tell you to keep checking it for  "a few more days or weeks, and then call if it does not go back down. Then, they can help you decide what to do next.  All topics are updated as new evidence becomes available and our peer review process is complete.  This topic retrieved from [employer] on: Mar 29, 2024.  Topic 919080 Version 11.0  Release: 32.2.4 - C32.87  © 2024 UpToDate, Inc. and/or its affiliates. All rights reserved.  table 1: Definition of normal and high blood pressure  Level  Top number  Bottom number    High 130 or above 80 or above   Elevated 120 to 129 79 or below   Normal 119 or below 79 or below   These definitions are from the American College of Cardiology/American Heart Association. Other expert groups might use slightly different definitions.  \"Elevated blood pressure\" is a term doctor or nurses use as a warning. It means you do not yet have high blood pressure, but your blood pressure is not as low as it should be for good health.  Graphic 14399 Version 6.0  figure 1: Using a home blood pressure meter     This is an example of a person using a home blood pressure meter. Blood pressure is explained with 2 numbers. For instance, your blood pressure might be \"140 over 90.\" The \"systolic\" (first) number is the pressure inside your arteries when your heart is warner. The \"diastolic\" (second) number is the pressure inside your arteries when your heart is relaxed. Most home blood pressure monitors also show your pulse. Your pulse is the number of times your heart beats in 1 minute.  Graphic 462207 Version 2.0  table 2: 7-day diary for checking blood pressure at home  Day 1  Day 2  Day 3  Day 4  Day 5  Day 6  Day 7    Morning  1st read Morning  1st read Morning  1st read Morning  1st read Morning  1st read Morning  1st read Morning  1st read   Systolic: __________ Systolic: __________ Systolic: __________ Systolic: __________ Systolic: __________ Systolic: __________ Systolic: __________   Diastolic: __________ Diastolic: __________ " Diastolic: __________ Diastolic: __________ Diastolic: __________ Diastolic: __________ Diastolic: __________   Pulse: __________ Pulse: __________ Pulse: __________ Pulse: __________ Pulse: __________ Pulse: __________ Pulse: __________   Morning  2nd read Morning  2nd read Morning  2nd read Morning  2nd read Morning  2nd read Morning  2nd read Morning  2nd read   Systolic: __________ Systolic: __________ Systolic: __________ Systolic: __________ Systolic: __________ Systolic: __________ Systolic: __________   Diastolic: __________ Diastolic: __________ Diastolic: __________ Diastolic: __________ Diastolic: __________ Diastolic: __________ Diastolic: __________   Pulse: __________ Pulse: __________ Pulse: __________ Pulse: __________ Pulse: __________ Pulse: __________ Pulse: __________   Evening  1st read Evening  1st read Evening  1st read Evening  1st read Evening  1st read Evening  1st read Evening  1st read   Systolic: __________ Systolic: __________ Systolic: __________ Systolic: __________ Systolic: __________ Systolic: __________ Systolic: __________   Diastolic: __________ Diastolic: __________ Diastolic: __________ Diastolic: __________ Diastolic: __________ Diastolic: __________ Diastolic: __________   Pulse: __________ Pulse: __________ Pulse: __________ Pulse: __________ Pulse: __________ Pulse: __________ Pulse: __________   Evening  2nd read Evening  2nd read Evening  2nd read Evening  2nd read Evening  2nd read Evening  2nd read Evening  2nd read   Systolic: __________ Systolic: __________ Systolic: __________ Systolic: __________ Systolic: __________ Systolic: __________ Systolic: __________   Diastolic: __________ Diastolic: __________ Diastolic: __________ Diastolic: __________ Diastolic: __________ Diastolic: __________ Diastolic: __________   Pulse: __________ Pulse: __________ Pulse: __________ Pulse: __________ Pulse: __________ Pulse: __________ Pulse: __________   Notes    Notes    Notes     "Notes    Notes    Notes    Notes      ____________________ ____________________ ____________________ ____________________ ____________________ ____________________ ____________________   ____________________ ____________________ ____________________ ____________________ ____________________ ____________________ ____________________   ____________________ ____________________ ____________________ ____________________ ____________________ ____________________ ____________________   Patient name: ______________________________     Patient ID: ________________________________    Primary care provider: _______________________    Average BP: _______________________________    You can use this table to keep track of your blood pressure (BP) and pulse.  When looking at your home meter, you will probably see at least 3 numbers:  Your \"systolic\" blood pressure: This is the top number of a BP measurement. For example, if your BP is \"140 over 90,\" 140 is the systolic.  Your \"diastolic\" blood pressure: This is the bottom number of a BP measurement. If your BP is \"140 over 90,\" 90 is the diastolic.  Your pulse: This is the number of times your heart beats in 1 minute.  BP: blood pressure.  Graphic 818836 Version 2.0  Consumer Information Use and Disclaimer   Disclaimer: This generalized information is a limited summary of diagnosis, treatment, and/or medication information. It is not meant to be comprehensive and should be used as a tool to help the user understand and/or assess potential diagnostic and treatment options. It does NOT include all information about conditions, treatments, medications, side effects, or risks that may apply to a specific patient. It is not intended to be medical advice or a substitute for the medical advice, diagnosis, or treatment of a health care provider based on the health care provider's examination and assessment of a patient's specific and unique circumstances. Patients must speak with a " health care provider for complete information about their health, medical questions, and treatment options, including any risks or benefits regarding use of medications. This information does not endorse any treatments or medications as safe, effective, or approved for treating a specific patient. UpToDate, Inc. and its affiliates disclaim any warranty or liability relating to this information or the use thereof.The use of this information is governed by the Terms of Use, available at https://www.woltersCitizengineuwer.com/en/know/clinical-effectiveness-terms. 2024© UpToDate, Inc. and its affiliates and/or licensors. All rights reserved.  Copyright   © 2024 UpToDate, Inc. and/or its affiliates. All rights reserved.

## 2024-12-09 NOTE — PROGRESS NOTES
[facility]  Ambulatory Office Visit     PATIENT INFORMATION   Name: Yrn Munroe   YOB: 1965   MRN: [id number]  Encounter Provider: Evan Marte MD    Encounter Date: 12/9/2024    ASSESSMENT & PLAN     Assessment & Plan  Primary hypertension  BP Readings from Last 3 Encounters:   12/09/24 130/80   07/10/24 (!) 149/104   04/10/24 128/84      Currently not prescribed any meds.   Reports:  Having high blood pressure diagnosed over 20 years ago  Assessment: Appears to be either borderline or uncontrolled and would benefit from treatment due to chronicity  Med changes: Yes, verapamil 40 mg 3 times daily with history of cluster headaches.    Lifestyle modifications recommended, including reduced sodium intake, regular exercise, maintaining a healthy weight, limiting alcohol consumption, and/or avoiding cig smoking.  Return in 3 months for Annual Physical.    Orders:    Comprehensive metabolic panel    CBC and differential    TSH, 3rd generation with Free T4 reflex; Future    Intractable episodic cluster headache  Currently not prescribed any meds.   Today pt reports: Reports concerns. Episodic headaches, sharp lasting over an hour, tearing of the eyes occurs and always orbital and on the right side, has been going on for a few months now.  Assessment: Appears to be cluster headache, also with primary hypertension untreated.  Would benefit from calcium channel blocker.  Patient agreeable  Med changes: Yes, verapamil 40 mg 3 times daily.    Discussed about being consistent with medication and monitoring blood pressures at home and symptoms  Sumatriptan for treatment of acute headaches  Return in 3 months for annual physical    Orders:    verapamil (CALAN) 40 mg tablet; Take 1 tablet (40 mg total) by mouth 3 (three) times a day    SUMAtriptan (IMITREX) 50 mg tablet; Take 1 tablet (5 mg total) by mouth once as needed for cluster headache, may repeat in 2 hours if necessary. Max of 2 doses in  24 hours.    Vitamin D insufficiency    Orders:    Vitamin D 25 hydroxy    Encounter for lipid screening for cardiovascular disease    Orders:    Lipid Panel with Direct LDL reflex    Apolipoprotein B    Lipoprotein A (LPA)    Prostate cancer screening    Orders:    PSA, Total Screen    Elevated serum glucose    Orders:    Hemoglobin A1C         FOLLOW UP   Return in about 3 months (around 3/9/2025) for Annual physical.    CURRENT MEDICATIONS     Current Outpatient Medications:     dicyclomine (BENTYL) 20 mg tablet, Take 1 tablet (20 mg total) by mouth every 6 (six) hours, Disp: 60 tablet, Rfl: 3    dicyclomine (BENTYL) 20 mg tablet, Take 1 tablet (20 mg total) by mouth every 6 (six) hours, Disp: 124 tablet, Rfl: 6    dicyclomine (BENTYL) 20 mg tablet, Take 1 tablet (20 mg total) by mouth every 6 (six) hours, Disp: 60 tablet, Rfl: 5    Eluxadoline 75 MG TABS, Take 1 tablet (75 mg total) by mouth 2 (two) times a day, Disp: 62 tablet, Rfl: 3    fluticasone (FLONASE) 50 mcg/act nasal spray, 1 spray into each nostril daily for 14 days, Disp: 9.9 mL, Rfl: 0    pantoprazole (PROTONIX) 40 mg tablet, Take 1 tablet (40 mg total) by mouth daily, Disp: 90 tablet, Rfl: 3    pantoprazole (PROTONIX) 40 mg tablet, Take 1 tablet (40 mg total) by mouth daily, Disp: 31 tablet, Rfl: 11    SUMAtriptan (IMITREX) 50 mg tablet, Take 1 tablet (5 mg total) by mouth once as needed for cluster headache, may repeat in 2 hours if necessary. Max of 2 doses in 24 hours., Disp: 10 tablet, Rfl: 5    verapamil (CALAN) 40 mg tablet, Take 1 tablet (40 mg total) by mouth 3 (three) times a day, Disp: 300 tablet, Rfl: 1      CHIEF COMPLIANT     Chief Complaint   Patient presents with    Hypertension        HISTORY OF PRESENT ILLNESS    History of Present Illness     History obtained from : patient  Hypertension      Review of Systems    PAST MEDICAL & SURGICAL HISTORY     Past Medical History:   Diagnosis Date    Gout     Hypertension     resolved     "Mitral valve prolapse     Neutropenia (HCC)     Thrombocytosis     last assessed: 6/14/2016     Past Surgical History:   Procedure Laterality Date    COLONOSCOPY  2021    HAND SURGERY      ORTHOPEDIC SURGERY Left     hand fracture repaired    MO CYSTO/URETERO W/LITHOTRIPSY &INDWELL STENT INSRT Right 6/21/2016    Procedure: CYSTOSCOPY; URETEROSCOPY;  RETROGRADE PYELOGRAM; STENT ;  Surgeon: Deep Weiss MD;  Location: [facility];  Service: Urology    SHOULDER ARTHROSCOPY W/ LABRAL REPAIR      SHOULDER SURGERY         OBJECTIVES      /80 (BP Location: Left arm, Patient Position: Sitting, Cuff Size: Standard)   Pulse 93   Temp 97.8 °F (36.6 °C) (Tympanic)   Resp 16   Ht 6' 2\" (1.88 m)   Wt 83.1 kg (183 lb 3.2 oz)   SpO2 98%   BMI 23.52 kg/m²    Physical Exam  Vitals reviewed.   Constitutional:       General: He is not in acute distress.     Appearance: Normal appearance. He is not ill-appearing, toxic-appearing or diaphoretic.   HENT:      Head: Normocephalic and atraumatic.      Right Ear: External ear normal.      Left Ear: External ear normal.      Nose: Nose normal.      Mouth/Throat:      Mouth: Mucous membranes are moist.   Eyes:      General: No scleral icterus.        Right eye: No discharge.         Left eye: No discharge.      Extraocular Movements: Extraocular movements intact.      Conjunctiva/sclera: Conjunctivae normal.   Cardiovascular:      Rate and Rhythm: Normal rate and regular rhythm.      Pulses: Normal pulses.      Heart sounds: Normal heart sounds.   Pulmonary:      Effort: Pulmonary effort is normal. No respiratory distress.      Breath sounds: Normal breath sounds.   Abdominal:      Palpations: Abdomen is soft.      Tenderness: There is no abdominal tenderness.   Musculoskeletal:         General: No swelling. Normal range of motion.      Cervical back: Normal range of motion.   Skin:     General: Skin is warm and dry.   Neurological:      General: No focal deficit present. "      Mental Status: He is alert and oriented to person, place, and time.   Psychiatric:         Mood and Affect: Mood normal.         Behavior: Behavior normal.         Thought Content: Thought content normal.           Evan Marte MD  Family Medicine Physician   [facility] [place]      Administrative Statements     Medications have been reviewed by provider in current encounter

## 2024-12-24 ENCOUNTER — RESULTS FOLLOW-UP (OUTPATIENT)
Age: 59
End: 2024-12-24

## 2024-12-24 ENCOUNTER — APPOINTMENT (OUTPATIENT)
Dept: LAB | Facility: HOSPITAL | Age: 59
End: 2024-12-24
Attending: FAMILY MEDICINE
Payer: COMMERCIAL

## 2024-12-24 DIAGNOSIS — E55.9 VITAMIN D DEFICIENCY: Primary | ICD-10-CM

## 2024-12-24 DIAGNOSIS — G44.011 INTRACTABLE EPISODIC CLUSTER HEADACHE: ICD-10-CM

## 2024-12-24 DIAGNOSIS — I10 PRIMARY HYPERTENSION: ICD-10-CM

## 2024-12-24 LAB
25(OH)D3 SERPL-MCNC: 23.6 NG/ML (ref 30–100)
ALBUMIN SERPL BCG-MCNC: 4.5 G/DL (ref 3.5–5)
ALP SERPL-CCNC: 40 U/L (ref 34–104)
ALT SERPL W P-5'-P-CCNC: 16 U/L (ref 7–52)
ANION GAP SERPL CALCULATED.3IONS-SCNC: 4 MMOL/L (ref 4–13)
AST SERPL W P-5'-P-CCNC: 19 U/L (ref 13–39)
BASOPHILS # BLD AUTO: 0.06 THOUSANDS/ÂΜL (ref 0–0.1)
BASOPHILS NFR BLD AUTO: 1 % (ref 0–1)
BILIRUB SERPL-MCNC: 0.88 MG/DL (ref 0.2–1)
BUN SERPL-MCNC: 17 MG/DL (ref 5–25)
CALCIUM SERPL-MCNC: 9.6 MG/DL (ref 8.4–10.2)
CHLORIDE SERPL-SCNC: 108 MMOL/L (ref 96–108)
CHOLEST SERPL-MCNC: 158 MG/DL (ref ?–200)
CO2 SERPL-SCNC: 28 MMOL/L (ref 21–32)
CREAT SERPL-MCNC: 1.02 MG/DL (ref 0.6–1.3)
EOSINOPHIL # BLD AUTO: 0.32 THOUSAND/ÂΜL (ref 0–0.61)
EOSINOPHIL NFR BLD AUTO: 7 % (ref 0–6)
ERYTHROCYTE [DISTWIDTH] IN BLOOD BY AUTOMATED COUNT: 11 % (ref 11.6–15.1)
EST. AVERAGE GLUCOSE BLD GHB EST-MCNC: 94 MG/DL
GFR SERPL CREATININE-BSD FRML MDRD: 78 ML/MIN/1.73SQ M
GLUCOSE P FAST SERPL-MCNC: 96 MG/DL (ref 65–99)
HBA1C MFR BLD: 4.9 %
HCT VFR BLD AUTO: 43.2 % (ref 36.5–49.3)
HDLC SERPL-MCNC: 70 MG/DL
HGB BLD-MCNC: 14.2 G/DL (ref 12–17)
IMM GRANULOCYTES # BLD AUTO: 0.01 THOUSAND/UL (ref 0–0.2)
IMM GRANULOCYTES NFR BLD AUTO: 0 % (ref 0–2)
LDLC SERPL CALC-MCNC: 72 MG/DL (ref 0–100)
LYMPHOCYTES # BLD AUTO: 2.47 THOUSANDS/ÂΜL (ref 0.6–4.47)
LYMPHOCYTES NFR BLD AUTO: 52 % (ref 14–44)
MCH RBC QN AUTO: 35.5 PG (ref 26.8–34.3)
MCHC RBC AUTO-ENTMCNC: 32.9 G/DL (ref 31.4–37.4)
MCV RBC AUTO: 108 FL (ref 82–98)
MONOCYTES # BLD AUTO: 0.46 THOUSAND/ÂΜL (ref 0.17–1.22)
MONOCYTES NFR BLD AUTO: 10 % (ref 4–12)
NEUTROPHILS # BLD AUTO: 1.44 THOUSANDS/ÂΜL (ref 1.85–7.62)
NEUTS SEG NFR BLD AUTO: 30 % (ref 43–75)
NRBC BLD AUTO-RTO: 0 /100 WBCS
PLATELET # BLD AUTO: 335 THOUSANDS/UL (ref 149–390)
PMV BLD AUTO: 9.7 FL (ref 8.9–12.7)
POTASSIUM SERPL-SCNC: 4.7 MMOL/L (ref 3.5–5.3)
PROT SERPL-MCNC: 6.7 G/DL (ref 6.4–8.4)
PSA SERPL-MCNC: 0.51 NG/ML (ref 0–4)
RBC # BLD AUTO: 4 MILLION/UL (ref 3.88–5.62)
SODIUM SERPL-SCNC: 140 MMOL/L (ref 135–147)
TRIGL SERPL-MCNC: 78 MG/DL (ref ?–150)
TSH SERPL DL<=0.05 MIU/L-ACNC: 2.86 UIU/ML (ref 0.45–4.5)
WBC # BLD AUTO: 4.76 THOUSAND/UL (ref 4.31–10.16)

## 2024-12-24 PROCEDURE — 84443 ASSAY THYROID STIM HORMONE: CPT

## 2024-12-24 RX ORDER — CHOLECALCIFEROL (VITAMIN D3) 1250 MCG
50000 CAPSULE ORAL 2 TIMES WEEKLY
Qty: 8 CAPSULE | Refills: 3 | Status: SHIPPED | OUTPATIENT
Start: 2024-12-26

## 2024-12-25 LAB — APO B SERPL-MCNC: 69 MG/DL

## 2024-12-26 LAB — LPA SERPL-SCNC: 136.4 NMOL/L

## 2024-12-27 RX ORDER — SUMATRIPTAN 20 MG/1
1 SPRAY NASAL ONCE AS NEEDED
Qty: 6 EACH | Refills: 0 | Status: SHIPPED | OUTPATIENT
Start: 2024-12-27 | End: 2025-01-02

## 2025-01-02 RX ORDER — VERAPAMIL HYDROCHLORIDE 80 MG/1
80 TABLET ORAL 3 TIMES DAILY
Qty: 300 TABLET | Refills: 3 | Status: SHIPPED | OUTPATIENT
Start: 2025-01-02

## 2025-01-15 ENCOUNTER — TELEPHONE (OUTPATIENT)
Dept: OTHER | Facility: OTHER | Age: 60
End: 2025-01-15

## 2025-01-15 NOTE — TELEPHONE ENCOUNTER
Patient called and is asking if the office can give him a call regarding if Dr. Nowak accepts [employer] insurance, [employer].

## 2025-01-16 ENCOUNTER — TELEPHONE (OUTPATIENT)
Age: 60
End: 2025-01-16

## 2025-01-16 NOTE — TELEPHONE ENCOUNTER
Spoke to pt, he is looking for Insurance we take here.  I gave him [employer], [facility] and [id number].    Pt will call back with ins. Info.

## 2025-07-28 ENCOUNTER — OFFICE VISIT (OUTPATIENT)
Age: 60
End: 2025-07-28
Payer: COMMERCIAL

## 2025-07-28 ENCOUNTER — TELEPHONE (OUTPATIENT)
Age: 60
End: 2025-07-28

## 2025-07-28 VITALS
HEIGHT: 74 IN | OXYGEN SATURATION: 96 % | BODY MASS INDEX: 24.31 KG/M2 | HEART RATE: 81 BPM | TEMPERATURE: 97.1 F | RESPIRATION RATE: 18 BRPM | DIASTOLIC BLOOD PRESSURE: 72 MMHG | SYSTOLIC BLOOD PRESSURE: 124 MMHG | WEIGHT: 189.4 LBS

## 2025-07-28 DIAGNOSIS — Z13.89 SCREENING FOR SKIN CONDITION: ICD-10-CM

## 2025-07-28 DIAGNOSIS — E53.8 VITAMIN B12 DEFICIENCY: ICD-10-CM

## 2025-07-28 DIAGNOSIS — Z00.00 ADULT GENERAL MEDICAL EXAMINATION: Primary | ICD-10-CM

## 2025-07-28 DIAGNOSIS — E55.9 VITAMIN D DEFICIENCY: ICD-10-CM

## 2025-07-28 PROBLEM — R73.01 IMPAIRED FASTING GLUCOSE: Status: RESOLVED | Noted: 2018-10-02 | Resolved: 2025-07-28

## 2025-07-28 PROCEDURE — 99396 PREV VISIT EST AGE 40-64: CPT | Performed by: INTERNAL MEDICINE

## 2025-08-12 ENCOUNTER — APPOINTMENT (OUTPATIENT)
Dept: LAB | Facility: HOSPITAL | Age: 60
End: 2025-08-12
Payer: COMMERCIAL

## 2025-08-13 ENCOUNTER — RESULTS FOLLOW-UP (OUTPATIENT)
Age: 60
End: 2025-08-13